# Patient Record
Sex: MALE | Race: BLACK OR AFRICAN AMERICAN | Employment: FULL TIME | URBAN - METROPOLITAN AREA
[De-identification: names, ages, dates, MRNs, and addresses within clinical notes are randomized per-mention and may not be internally consistent; named-entity substitution may affect disease eponyms.]

---

## 2017-02-14 ENCOUNTER — OFFICE VISIT (OUTPATIENT)
Dept: FAMILY MEDICINE | Facility: CLINIC | Age: 47
End: 2017-02-14
Payer: COMMERCIAL

## 2017-02-14 VITALS
BODY MASS INDEX: 27.57 KG/M2 | OXYGEN SATURATION: 97 % | DIASTOLIC BLOOD PRESSURE: 68 MMHG | TEMPERATURE: 98.5 F | WEIGHT: 226.4 LBS | RESPIRATION RATE: 16 BRPM | SYSTOLIC BLOOD PRESSURE: 120 MMHG | HEART RATE: 68 BPM | HEIGHT: 76 IN

## 2017-02-14 DIAGNOSIS — J30.2 SEASONAL ALLERGIC RHINITIS, UNSPECIFIED ALLERGIC RHINITIS TRIGGER: ICD-10-CM

## 2017-02-14 DIAGNOSIS — E55.9 VITAMIN D DEFICIENCY: ICD-10-CM

## 2017-02-14 DIAGNOSIS — R53.83 FATIGUE, UNSPECIFIED TYPE: ICD-10-CM

## 2017-02-14 DIAGNOSIS — Z00.00 ROUTINE GENERAL MEDICAL EXAMINATION AT A HEALTH CARE FACILITY: Primary | ICD-10-CM

## 2017-02-14 LAB
ALBUMIN SERPL-MCNC: 3.9 G/DL (ref 3.4–5)
ALP SERPL-CCNC: 48 U/L (ref 40–150)
ALT SERPL W P-5'-P-CCNC: 32 U/L (ref 0–70)
ANION GAP SERPL CALCULATED.3IONS-SCNC: 6 MMOL/L (ref 3–14)
AST SERPL W P-5'-P-CCNC: 25 U/L (ref 0–45)
BILIRUB SERPL-MCNC: 0.4 MG/DL (ref 0.2–1.3)
BUN SERPL-MCNC: 16 MG/DL (ref 7–30)
CALCIUM SERPL-MCNC: 8.5 MG/DL (ref 8.5–10.1)
CHLORIDE SERPL-SCNC: 105 MMOL/L (ref 94–109)
CO2 SERPL-SCNC: 30 MMOL/L (ref 20–32)
CREAT SERPL-MCNC: 1.22 MG/DL (ref 0.66–1.25)
ERYTHROCYTE [DISTWIDTH] IN BLOOD BY AUTOMATED COUNT: 13.3 % (ref 10–15)
GFR SERPL CREATININE-BSD FRML MDRD: 64 ML/MIN/1.7M2
GLUCOSE SERPL-MCNC: 108 MG/DL (ref 70–99)
HCT VFR BLD AUTO: 43.6 % (ref 40–53)
HDLC SERPL-MCNC: 47 MG/DL
HGB BLD-MCNC: 15.2 G/DL (ref 13.3–17.7)
LDLC SERPL DIRECT ASSAY-MCNC: 94 MG/DL
MCH RBC QN AUTO: 31.9 PG (ref 26.5–33)
MCHC RBC AUTO-ENTMCNC: 34.9 G/DL (ref 31.5–36.5)
MCV RBC AUTO: 91 FL (ref 78–100)
PLATELET # BLD AUTO: 174 10E9/L (ref 150–450)
POTASSIUM SERPL-SCNC: 3.5 MMOL/L (ref 3.4–5.3)
PROT SERPL-MCNC: 7 G/DL (ref 6.8–8.8)
RBC # BLD AUTO: 4.77 10E12/L (ref 4.4–5.9)
SODIUM SERPL-SCNC: 141 MMOL/L (ref 133–144)
TSH SERPL DL<=0.005 MIU/L-ACNC: 0.7 MU/L (ref 0.4–4)
WBC # BLD AUTO: 4.3 10E9/L (ref 4–11)

## 2017-02-14 PROCEDURE — 83718 ASSAY OF LIPOPROTEIN: CPT | Performed by: FAMILY MEDICINE

## 2017-02-14 PROCEDURE — 83721 ASSAY OF BLOOD LIPOPROTEIN: CPT | Performed by: FAMILY MEDICINE

## 2017-02-14 PROCEDURE — 85027 COMPLETE CBC AUTOMATED: CPT | Performed by: FAMILY MEDICINE

## 2017-02-14 PROCEDURE — 80053 COMPREHEN METABOLIC PANEL: CPT | Performed by: FAMILY MEDICINE

## 2017-02-14 PROCEDURE — 36415 COLL VENOUS BLD VENIPUNCTURE: CPT | Performed by: FAMILY MEDICINE

## 2017-02-14 PROCEDURE — 82306 VITAMIN D 25 HYDROXY: CPT | Performed by: FAMILY MEDICINE

## 2017-02-14 PROCEDURE — 84443 ASSAY THYROID STIM HORMONE: CPT | Performed by: FAMILY MEDICINE

## 2017-02-14 PROCEDURE — 99396 PREV VISIT EST AGE 40-64: CPT | Performed by: FAMILY MEDICINE

## 2017-02-14 RX ORDER — FEXOFENADINE HCL AND PSEUDOEPHEDRINE HCI 60; 120 MG/1; MG/1
1 TABLET, EXTENDED RELEASE ORAL 2 TIMES DAILY
Qty: 180 TABLET | Refills: 3 | Status: SHIPPED | OUTPATIENT
Start: 2017-02-14 | End: 2018-12-31

## 2017-02-14 RX ORDER — FLUTICASONE PROPIONATE 50 MCG
1-2 SPRAY, SUSPENSION (ML) NASAL DAILY
Qty: 48 G | Refills: 1 | Status: SHIPPED | OUTPATIENT
Start: 2017-02-14 | End: 2018-03-07

## 2017-02-14 NOTE — MR AVS SNAPSHOT
After Visit Summary   2/14/2017    Bj Philip    MRN: [id number]           Patient Information     Date Of Birth          1970        Visit Information        Provider Department      2/14/2017 2:40 PM Acacia Petersen MD [facility]        Today's Diagnoses     Routine general medical examination at a health care facility    -  1    Fatigue, unspecified type          Care Instructions      Preventive Health Recommendations  Male Ages 40 to 49    Yearly exam:             See your health care provider every year in order to  o   Review health changes.   o   Discuss preventive care.    o   Review your medicines if your doctor has prescribed any.    You should be tested each year for STDs (sexually transmitted diseases) if you re at risk.     Have a cholesterol test every 5 years.     Have a colonoscopy (test for colon cancer) if someone in your family has had colon cancer or polyps before age 50.     After age 45, have a diabetes test (fasting glucose). If you are at risk for diabetes, you should have this test every 3 years.      Talk with your health care provider about whether or not a prostate cancer screening test (PSA) is right for you.    Shots: Get a flu shot each year. Get a tetanus shot every 10 years.     Nutrition:    Eat at least 5 servings of fruits and vegetables daily.     Eat whole-grain bread, whole-wheat pasta and brown rice instead of white grains and rice.     Talk to your provider about Calcium and Vitamin D.     Lifestyle    Exercise for at least 150 minutes a week (30 minutes a day, 5 days a week). This will help you control your weight and prevent disease.     Limit alcohol to one drink per day.     No smoking.     Wear sunscreen to prevent skin cancer.     See your dentist every six months for an exam and cleaning.            Follow-ups after your visit        Follow-up notes from your care team     Return in about 1 year (around 2/14/2018).     "  Who to contact     If you have questions or need follow up information about today's clinic visit or your schedule please contact [facility] directly at [phone].  Normal or non-critical lab and imaging results will be communicated to you by MyChart, letter or phone within 4 business days after the clinic has received the results. If you do not hear from us within 7 days, please contact the clinic through [employer] Software[employer] or phone. If you have a critical or abnormal lab result, we will notify you by phone as soon as possible.  Submit refill requests through [employer]pack or call your pharmacy and they will forward the refill request to us. Please allow 3 business days for your refill to be completed.          Additional Information About Your Visit        [employer] Backpack Information     [employer] gives you secure access to your electronic health record. If you see a primary care provider, you can also send messages to your care team and make appointments. If you have questions, please call your primary care clinic.  If you do not have a primary care provider, please call [phone] and they will assist you.        Care EveryWhere ID     This is your Care EveryWhere ID. This could be used by other organizations to access your [place] medical records  [id number]        Your Vitals Were     Pulse Temperature Respirations Height Pulse Oximetry BMI (Body Mass Index)    68 98.5  F (36.9  C) (Oral) 16 1.925 m (6' 3.79\") 97% 27.71 kg/m2       Blood Pressure from Last 3 Encounters:   02/14/17 120/68   10/25/16 106/68   10/12/16 106/68    Weight from Last 3 Encounters:   02/14/17 102.7 kg (226 lb 6.4 oz)   10/25/16 101.6 kg (224 lb)   10/12/16 100.5 kg (221 lb 9.6 oz)              We Performed the Following     CBC with platelets     Comprehensive metabolic panel     HDL cholesterol     LDL cholesterol direct     TSH with free T4 reflex     Vitamin D Deficiency        Primary Care Provider Office Phone # Fax #    Acacia " [place] Petersen [phone]       [facility] [address] [address]        Thank you!     Thank you for choosing [facility]  for your care. Our goal is always to provide you with excellent care. Hearing back from our patients is one way we can continue to improve our services. Please take a few minutes to complete the written survey that you may receive in the mail after your visit with us. Thank you!             Your Updated Medication List - Protect others around you: Learn how to safely use, store and throw away your medicines at www.disposemymeds.org.          This list is accurate as of: 2/14/17  3:29 PM.  Always use your most recent med list.                   Brand Name Dispense Instructions for use    fexofenadine-pseudoePHEDrine  MG per 12 hr tablet    ALLEGRA-D    180 tablet    Take 1 tablet by mouth 2 times daily       fluticasone 50 MCG/ACT spray    FLONASE    3 Package    Spray 1-2 sprays into both nostrils daily       ibuprofen 800 MG tablet    ADVIL/MOTRIN    90 tablet    Take 1 tablet (800 mg) by mouth 3 times daily as needed for pain       sulindac 200 MG tablet    CLINORIL    60 tablet    Take 1 tablet (200 mg) by mouth 2 times daily (with meals)

## 2017-02-14 NOTE — NURSING NOTE
"Chief Complaint   Patient presents with     Physical       Initial /68 (BP Location: Right arm, Cuff Size: Adult Regular)  Pulse 68  Temp 98.5  F (36.9  C) (Oral)  Resp 16  Ht 1.925 m (6' 3.79\")  Wt 102.7 kg (226 lb 6.4 oz)  SpO2 97%  BMI 27.71 kg/m2 Estimated body mass index is 27.71 kg/(m^2) as calculated from the following:    Height as of this encounter: 1.925 m (6' 3.79\").    Weight as of this encounter: 102.7 kg (226 lb 6.4 oz).  Medication Reconciliation: complete     JCARLOS Clark (R)'    "

## 2017-02-14 NOTE — PROGRESS NOTES
SUBJECTIVE:     CC: Bj Philip is an 46 year old male who presents for preventative health visit.     Healthy Habits:    Do you get at least three servings of calcium containing foods daily (dairy, green leafy vegetables, etc.)? y    Amount of exercise or daily activities, outside of work: 4 day(s) per week 1.5hrs    Problems taking medications regularly No    Medication side effects: No    Have you had an eye exam in the past two years? yes    Do you see a dentist twice per year? yes    Do you have sleep apnea, excessive snoring or daytime drowsiness?snoring           Today's PHQ-2 Score:   PHQ-2 ( 1999 Pfizer) 2/14/2017 5/6/2015   Q1: Little interest or pleasure in doing things 0 0   Q2: Feeling down, depressed or hopeless 0 0   PHQ-2 Score 0 0       Abuse: Current or Past(Physical, Sexual or Emotional)- No  Do you feel safe in your environment - Yes    Social History   Substance Use Topics     Smoking status: Never Smoker     Smokeless tobacco: Never Used     Alcohol use No     The patient does not drink >3 drinks per day nor >7 drinks per week.    Last PSA: No results found for: PSA    Recent Labs   Lab Test  04/27/15   0913   CHOL  169   HDL  38*   LDL  115   TRIG  81   CHOLHDLRATIO  4.4       Reviewed orders with patient. Reviewed health maintenance and updated orders accordingly - Yes    All Histories reviewed and updated in Epic.  Here today for routine checkup. For the most part he is doing fine but he notes some overall fatigue. Continues to be quite active at the gym which is feels as though his workouts aren't as strong as they should be. No fevers or chills or weight changes or any other significant constitutional symptoms.    ROS:  CONSTITUTIONAL: As above  I: NEGATIVE for worrisome rashes, moles or lesions  E: NEGATIVE for vision changes or irritation  ENT: NEGATIVE for ear, mouth and throat problems  R: NEGATIVE for significant cough or SOB  CV: NEGATIVE for chest pain, palpitations or  "peripheral edema  GI: NEGATIVE for nausea, abdominal pain, heartburn, or change in bowel habits   male: negative for dysuria, hematuria, decreased urinary stream, erectile dysfunction, urethral discharge  M: NEGATIVE for significant arthralgias or myalgia  N: NEGATIVE for weakness, dizziness or paresthesias  P: NEGATIVE for changes in mood or affect    Problem list, Medication list, Allergies, and Medical/Social/Surgical histories reviewed in [facility] and updated as appropriate.  Labs reviewed in EPIC  BP Readings from Last 3 Encounters:   02/14/17 120/68   10/25/16 106/68   10/12/16 106/68    Wt Readings from Last 3 Encounters:   02/14/17 102.7 kg (226 lb 6.4 oz)   10/25/16 101.6 kg (224 lb)   10/12/16 100.5 kg (221 lb 9.6 oz)                  OBJECTIVE:     /68 (BP Location: Right arm, Cuff Size: Adult Regular)  Pulse 68  Temp 98.5  F (36.9  C) (Oral)  Resp 16  Ht 1.925 m (6' 3.79\")  Wt 102.7 kg (226 lb 6.4 oz)  SpO2 97%  BMI 27.71 kg/m2  EXAM:  GENERAL: healthy, alert and no distress  EYES: Eyes grossly normal to inspection, PERRL and conjunctivae and sclerae normal  HENT: ear canals and TM's normal, nose and mouth without ulcers or lesions  NECK: no adenopathy, no asymmetry, masses, or scars and thyroid normal to palpation  RESP: lungs clear to auscultation - no rales, rhonchi or wheezes  CV: regular rate and rhythm, normal S1 S2, no S3 or S4, no murmur, click or rub, no peripheral edema and peripheral pulses strong  ABDOMEN: soft, nontender, no hepatosplenomegaly, no masses and bowel sounds normal  MS: no gross musculoskeletal defects noted, no edema  SKIN: no suspicious lesions or rashes  NEURO: Normal strength and tone, mentation intact and speech normal  PSYCH: mentation appears normal, affect normal/bright    ASSESSMENT/PLAN:     1. Routine general medical examination at a health care facility  Health care maintenance up to date otherwise   - CBC with platelets  - HDL cholesterol  - LDL cholesterol " "direct  - TSH with free T4 reflex  - Vitamin D Deficiency    2. Fatigue, unspecified type  Offhand this does not strike me as anything specifically pathologic. Talked about looking at some screening lab work  - CBC with platelets  - Comprehensive metabolic panel  - TSH with free T4 reflex  - Vitamin D Deficiency    COUNSELING:  Reviewed preventive health counseling, as reflected in patient instructions       Regular exercise       Healthy diet/nutrition       Vision screening       Safe sex practices/STD prevention         reports that he has never smoked. He has never used smokeless tobacco.    Estimated body mass index is 27.71 kg/(m^2) as calculated from the following:    Height as of this encounter: 1.925 m (6' 3.79\").    Weight as of this encounter: 102.7 kg (226 lb 6.4 oz).       Counseling Resources:  ATP IV Guidelines  Pooled Cohorts Equation Calculator  FRAX Risk Assessment  ICSI Preventive Guidelines  Dietary Guidelines for Americans, 2010  USDA's MyPlate  ASA Prophylaxis  Lung CA Screening    Acacia Petersen MD  [facility]  "

## 2017-02-15 LAB — DEPRECATED CALCIDIOL+CALCIFEROL SERPL-MC: 15 UG/L (ref 20–75)

## 2017-02-19 PROBLEM — E55.9 VITAMIN D DEFICIENCY: Status: ACTIVE | Noted: 2017-02-19

## 2017-02-19 RX ORDER — ERGOCALCIFEROL 1.25 MG/1
50000 CAPSULE, LIQUID FILLED ORAL WEEKLY
Qty: 12 CAPSULE | Refills: 1 | Status: SHIPPED | OUTPATIENT
Start: 2017-02-19 | End: 2017-05-09

## 2017-02-20 NOTE — PROGRESS NOTES
Amado,  Overall your lab work looks pretty good - but that vitamin D is very low.  This could very well be the reason you feel somewhat sluggish.  I bet if we bring this up you will feel a lot better.  I will send a prescription for a prescription-strength vitamin supplement - take it once weekly.  And we can plan to recheck in about 3 months.  JUAN F Petersen M.D.

## 2017-03-14 ENCOUNTER — OFFICE VISIT (OUTPATIENT)
Dept: FAMILY MEDICINE | Facility: CLINIC | Age: 47
End: 2017-03-14
Payer: COMMERCIAL

## 2017-03-14 VITALS — DIASTOLIC BLOOD PRESSURE: 66 MMHG | TEMPERATURE: 98.3 F | SYSTOLIC BLOOD PRESSURE: 112 MMHG

## 2017-03-14 DIAGNOSIS — Z71.84 ENCOUNTER FOR COUNSELING FOR TRAVEL: Primary | ICD-10-CM

## 2017-03-14 PROCEDURE — 90717 YELLOW FEVER VACCINE SUBQ: CPT | Mod: GA | Performed by: FAMILY MEDICINE

## 2017-03-14 PROCEDURE — 99402 PREV MED CNSL INDIV APPRX 30: CPT | Mod: 25 | Performed by: FAMILY MEDICINE

## 2017-03-14 PROCEDURE — 90471 IMMUNIZATION ADMIN: CPT | Mod: GA | Performed by: FAMILY MEDICINE

## 2017-03-14 RX ORDER — CIPROFLOXACIN 500 MG/1
TABLET, FILM COATED ORAL
Qty: 6 TABLET | Refills: 0 | Status: SHIPPED | OUTPATIENT
Start: 2017-03-14 | End: 2017-10-18

## 2017-03-14 RX ORDER — ATOVAQUONE AND PROGUANIL HYDROCHLORIDE 250; 100 MG/1; MG/1
1 TABLET, FILM COATED ORAL DAILY
Qty: 21 TABLET | Refills: 0 | Status: SHIPPED | OUTPATIENT
Start: 2017-03-14 | End: 2017-10-18

## 2017-03-14 NOTE — MR AVS SNAPSHOT
After Visit Summary   3/14/2017    Bj Philip    MRN: [id number]           Patient Information     Date Of Birth          1970        Visit Information        Provider Department      3/14/2017 1:45 PM Anirudh Velásquez MD [facility]        Today's Diagnoses     Encounter for counseling for travel    -  1       Follow-ups after your visit        Who to contact     If you have questions or need follow up information about today's clinic visit or your schedule please contact [facility] directly at [phone].  Normal or non-critical lab and imaging results will be communicated to you by Lockrhart, letter or phone within 4 business days after the clinic has received the results. If you do not hear from us within 7 days, please contact the clinic through [employer]It or phone. If you have a critical or abnormal lab result, we will notify you by phone as soon as possible.  Submit refill requests through [email or web address] or call your pharmacy and they will forward the refill request to us. Please allow 3 business days for your refill to be completed.          Additional Information About Your Visit        MyChart Information     [email or web address] gives you secure access to your electronic health record. If you see a primary care provider, you can also send messages to your care team and make appointments. If you have questions, please call your primary care clinic.  If you do not have a primary care provider, please call [phone] and they will assist you.        Care EveryWhere ID     This is your Care EveryWhere ID. This could be used by other organizations to access your [place] medical records  [id number]        Your Vitals Were     Temperature                   98.3  F (36.8  C) (Oral)            Blood Pressure from Last 3 Encounters:   03/14/17 112/66   02/14/17 120/68   10/25/16 106/68    Weight from Last 3 Encounters:   02/14/17 226 lb 6.4 oz (102.7 kg)   10/25/16 224 lb  (101.6 kg)   10/12/16 221 lb 9.6 oz (100.5 kg)              We Performed the Following     YELLOW FEVER IMMUNIZATN,LIVE,SUBCUT          Today's Medication Changes          These changes are accurate as of: 3/14/17 11:59 PM.  If you have any questions, ask your nurse or doctor.               Start taking these medicines.        Dose/Directions    atovaquone-proguanil 250-100 MG per tablet   Commonly known as:  MALARONE   Used for:  Encounter for counseling for travel   Started by:  Anirudh Velásquez MD        Dose:  1 tablet   Take 1 tablet by mouth daily Start 1 day before travelling to a Malaria risk area and continue until 7 days after return.   Quantity:  21 tablet   Refills:  0       ciprofloxacin 500 MG tablet   Commonly known as:  CIPRO   Used for:  Encounter for counseling for travel   Started by:  Anirudh Velásquez MD        Take one tablet twice a day for up to 3 days as needed for traveler's diarrhea   Quantity:  6 tablet   Refills:  0            Where to get your medicines      These medications were sent to [facility] [address] [place] [address] [address] [place] [address]  [address] [facility] [address]    Hours:  24-hours Phone:  [phone]     atovaquone-proguanil 250-100 MG per tablet    ciprofloxacin 500 MG tablet                Primary Care Provider Office Phone # Fax #    Acacia [place] Petersen [phone]       [address] [address]        Thank you!     Thank you for choosing [facility]  for your care. Our goal is always to provide you with excellent care. Hearing back from our patients is one way we can continue to improve our services. Please take a few minutes to complete the written survey that you may receive in the mail after your visit with us. Thank you!             Your Updated Medication List - Protect others around you: Learn how to safely use,  store and throw away your medicines at www.disposemymeds.org.          This list is accurate as of: 3/14/17 11:59 PM.  Always use your most recent med list.                   Brand Name Dispense Instructions for use    atovaquone-proguanil 250-100 MG per tablet    MALARONE    21 tablet    Take 1 tablet by mouth daily Start 1 day before travelling to a Malaria risk area and continue until 7 days after return.       ciprofloxacin 500 MG tablet    CIPRO    6 tablet    Take one tablet twice a day for up to 3 days as needed for traveler's diarrhea       fexofenadine-pseudoePHEDrine  MG per 12 hr tablet    ALLEGRA-D    180 tablet    Take 1 tablet by mouth 2 times daily       fluticasone 50 MCG/ACT spray    FLONASE    48 g    Spray 1-2 sprays into both nostrils daily       ibuprofen 800 MG tablet    ADVIL/MOTRIN    90 tablet    Take 1 tablet (800 mg) by mouth 3 times daily as needed for pain       sulindac 200 MG tablet    CLINORIL    60 tablet    Take 1 tablet (200 mg) by mouth 2 times daily (with meals)       vitamin D 00827 UNIT capsule    ERGOCALCIFEROL    12 capsule    Take 1 capsule (50,000 Units) by mouth once a week

## 2017-03-14 NOTE — PROGRESS NOTES
Itinerary:  [place]    Departure Date: 05/09/2017    Return Date: 05/20/2017    Length of Trip: 11 days     Purpose of Trip: Wedding Trip    Urban/Rural: Urban    Accommodations: Hotel     IMMUNIZATION HISTORY  Have you received any immunizations within the past 4 weeks?  No  Have you ever fainted from having your blood drawn or from an injection?  No  Have you ever had a fever reaction to vaccination?  No  Have you ever had any bad reaction or side effect from any vaccination?  No  Have you ever had hepatitis A or B vaccine?  Yes  Do you live (or work closely) with anyone who has AIDS, an AIDS-like condition, any other immune disorder or who is on chemotherapy for cancer or a   family history of immunodeficiency?  No  Have you received any injection of immune globulin or any blood products during the past 12 months?  No    Patient roomed by Joy Sanchez MA       Special medical concerns: none    /66  Temp 98.3  F (36.8  C) (Oral)  EXAM: deferred  Immunization History   Administered Date(s) Administered     DTAP (<7y) 10/13/2001     Hepatitis A Vac Ped/Adol-2 Dose 02/20/2003, 04/10/2012     Hepatitis B 01/01/2002, 02/02/2002, 06/02/2002     MMR 01/01/1988     TDAP (ADACEL AGES 11-64) 04/10/2012     Typhoid IM 02/20/2003, 03/06/2017     Yellow Fever 03/14/2017     Some of these vaccinations are estimated based on patient report    Immunizations discussed include: Yellow Fever  Malaraia prophylaxis recommended: Malarone  Symptomatic treatment for traveler's diarrhea: ciprofloxacin    ASSESSMENT/PLAN:    ICD-10-CM    1. Encounter for counseling for travel Z71.89 YELLOW FEVER IMMUNIZATN,LIVE,SUBCUT     atovaquone-proguanil (MALARONE) 250-100 MG per tablet     ciprofloxacin (CIPRO) 500 MG tablet     I have reviewed general recommendations for safe travel   including: food/water precautions, insect avoidance, safe sex   practices given high prevalence of HIV and other STDs,   roadway safety. Educational  materials and [employer] report provided.    Total visit time 30 minutes with over 50% of time spent counseling patient.

## 2017-05-09 ENCOUNTER — OFFICE VISIT (OUTPATIENT)
Dept: FAMILY MEDICINE | Facility: CLINIC | Age: 47
End: 2017-05-09
Payer: OTHER MISCELLANEOUS

## 2017-05-09 VITALS
TEMPERATURE: 97.7 F | HEIGHT: 76 IN | HEART RATE: 52 BPM | SYSTOLIC BLOOD PRESSURE: 116 MMHG | OXYGEN SATURATION: 98 % | RESPIRATION RATE: 14 BRPM | WEIGHT: 229 LBS | DIASTOLIC BLOOD PRESSURE: 78 MMHG | BODY MASS INDEX: 27.89 KG/M2

## 2017-05-09 DIAGNOSIS — M25.512 LEFT SHOULDER PAIN, UNSPECIFIED CHRONICITY: Primary | ICD-10-CM

## 2017-05-09 DIAGNOSIS — Z98.890 S/P ARTHROSCOPY OF SHOULDER: ICD-10-CM

## 2017-05-09 PROCEDURE — 99214 OFFICE O/P EST MOD 30 MIN: CPT | Performed by: FAMILY MEDICINE

## 2017-05-09 RX ORDER — HYDROCODONE BITARTRATE AND ACETAMINOPHEN 5; 325 MG/1; MG/1
1-2 TABLET ORAL EVERY 6 HOURS PRN
Qty: 30 TABLET | Refills: 0 | Status: SHIPPED | OUTPATIENT
Start: 2017-05-09 | End: 2018-02-19

## 2017-05-09 ASSESSMENT — PAIN SCALES - GENERAL: PAINLEVEL: NO PAIN (0)

## 2017-05-09 NOTE — MR AVS SNAPSHOT
After Visit Summary   5/9/2017    Bj Philip    MRN: [id number]           Patient Information     Date Of Birth          1970        Visit Information        Provider Department      5/9/2017 9:20 AM Acacia Petersen MD [facility]        Today's Diagnoses     Left shoulder pain, unspecified chronicity    -  1    S/P arthroscopy of shoulder           Follow-ups after your visit        Follow-up notes from your care team     Return if symptoms worsen or fail to improve.      Your next 10 appointments already scheduled     May 24, 2017 10:15 AM CDT   MR SHOULDER LEFT W/O CONTRAST with MGMR1   [facility] ([facility]    [address] [address] [place] [address] [phone]           Take your medicines as usual, unless your doctor tells you not to. Bring a list of your current medicines to your exam (including vitamins, minerals and over-the-counter drugs). Also bring the results of similar scans you may have had.  Please remove any body piercings and hair extensions before you arrive.  Follow your doctor s orders. If you do not, we may have to postpone your exam.  You will not have contrast for this exam. You do not need to do anything special to prepare.  The MRI machine uses a strong magnet. Please wear clothes without metal (snaps, zippers). A sweatsuit works well, or we may give you a hospital gown.   **IMPORTANT** THE INSTRUCTIONS BELOW ARE ONLY FOR THOSE PATIENTS WHO HAVE BEEN TOLD THEY WILL RECEIVE SEDATION OR GENERAL ANESTHESIA DURING THEIR MRI PROCEDURE:  IF YOU WILL RECEIVE SEDATION (take medicine to help you relax during your exam):   You must get the medicine from your doctor before you arrive. Bring the medicine to the exam. Do not take it at home.   Arrive one hour early. Bring someone who can take you home after the test. Your medicine will make you sleepy. After the exam, you may not drive, take a bus or take a  taxi by yourself.   No eating 8 hours before your exam. You may have clear liquids up until 4 hours before your exam. (Clear liquids include water, clear tea, black coffee and fruit juice without pulp.)  IF YOU WILL RECEIVE ANESTHESIA (be asleep for your exam):   Arrive 1 1/2 hours early. Bring someone who can take you home after the test. You may not drive, take a bus or take a taxi by yourself.   No eating 8 hours before your exam. You may have clear liquids up until 4 hours before your exam. (Clear liquids include water, clear tea, black coffee and fruit juice without pulp.)   You will spend four to five hours in the recovery room.  Please call the Imaging Department at your exam site with any questions.              Future tests that were ordered for you today     Open Future Orders        Priority Expected Expires Ordered    MR Shoulder Left w/o Contrast Routine  5/9/2018 5/9/2017            Who to contact     If you have questions or need follow up information about today's clinic visit or your schedule please contact [facility] directly at [phone].  Normal or non-critical lab and imaging results will be communicated to you by Wowsaihart, letter or phone within 4 business days after the clinic has received the results. If you do not hear from us within 7 days, please contact the clinic through [employer] or phone. If you have a critical or abnormal lab result, we will notify you by phone as soon as possible.  Submit refill requests through [employer] or call your pharmacy and they will forward the refill request to us. Please allow 3 business days for your refill to be completed.          Additional Information About Your Visit        [employer] Information     [employer] gives you secure access to your electronic health record. If you see a primary care provider, you can also send messages to your care team and make appointments. If you have questions, please call your primary care clinic.  If you do  "not have a primary care provider, please call [phone] and they will assist you.        Care EveryWhere ID     This is your Care EveryWhere ID. This could be used by other organizations to access your [facility] medical records  [id number]        Your Vitals Were     Pulse Temperature Respirations Height Pulse Oximetry BMI (Body Mass Index)    52 97.7  F (36.5  C) (Oral) 14 1.925 m (6' 3.78\") 98% 28.04 kg/m2       Blood Pressure from Last 3 Encounters:   05/09/17 116/78   03/14/17 112/66   02/14/17 120/68    Weight from Last 3 Encounters:   05/09/17 103.9 kg (229 lb)   02/14/17 102.7 kg (226 lb 6.4 oz)   10/25/16 101.6 kg (224 lb)                 Today's Medication Changes          These changes are accurate as of: 5/9/17 10:10 AM.  If you have any questions, ask your nurse or doctor.               Start taking these medicines.        Dose/Directions    HYDROcodone-acetaminophen 5-325 MG per tablet   Commonly known as:  NORCO   Used for:  Left shoulder pain, unspecified chronicity   Started by:  Acacia Petersen MD        Dose:  1-2 tablet   Take 1-2 tablets by mouth every 6 hours as needed for moderate to severe pain   Quantity:  30 tablet   Refills:  0       nabumetone 750 MG tablet   Commonly known as:  RELAFEN   Used for:  Left shoulder pain, unspecified chronicity   Started by:  Acacia Petersen MD        Dose:  750 mg   Take 1 tablet (750 mg) by mouth 2 times daily as needed for moderate pain   Quantity:  30 tablet   Refills:  1            Where to get your medicines      These medications were sent to [facility] [address] [place], [place] - [address] [facility] [address] [facility]  [address] [address]    Hours:  24-hours Phone:  [phone]     nabumetone 750 MG tablet         Some of these will need a paper prescription and others can be bought over the counter.  Ask your nurse if you have questions.     Bring a paper prescription for each of " these medications     HYDROcodone-acetaminophen 5-325 MG per tablet                Primary Care Provider Office Phone # Fax #    Acacia Washington Petersen [phone]       [facility] [address] [place] [address]        Thank you!     Thank you for choosing [facility]  for your care. Our goal is always to provide you with excellent care. Hearing back from our patients is one way we can continue to improve our services. Please take a few minutes to complete the written survey that you may receive in the mail after your visit with us. Thank you!             Your Updated Medication List - Protect others around you: Learn how to safely use, store and throw away your medicines at www.disposemymeds.org.          This list is accurate as of: 5/9/17 10:10 AM.  Always use your most recent med list.                   Brand Name Dispense Instructions for use    atovaquone-proguanil 250-100 MG per tablet    MALARONE    21 tablet    Take 1 tablet by mouth daily Start 1 day before travelling to a Malaria risk area and continue until 7 days after return.       ciprofloxacin 500 MG tablet    CIPRO    6 tablet    Take one tablet twice a day for up to 3 days as needed for traveler's diarrhea       fexofenadine-pseudoePHEDrine  MG per 12 hr tablet    ALLEGRA-D    180 tablet    Take 1 tablet by mouth 2 times daily       fluticasone 50 MCG/ACT spray    FLONASE    48 g    Spray 1-2 sprays into both nostrils daily       HYDROcodone-acetaminophen 5-325 MG per tablet    NORCO    30 tablet    Take 1-2 tablets by mouth every 6 hours as needed for moderate to severe pain       ibuprofen 800 MG tablet    ADVIL/MOTRIN    90 tablet    Take 1 tablet (800 mg) by mouth 3 times daily as needed for pain       nabumetone 750 MG tablet    RELAFEN    30 tablet    Take 1 tablet (750 mg) by mouth 2 times daily as needed for moderate pain       sulindac 200 MG tablet    CLINORIL    60  tablet    Take 1 tablet (200 mg) by mouth 2 times daily (with meals)

## 2017-05-09 NOTE — PROGRESS NOTES
"  SUBJECTIVE:                                                    Bj Philip is a 46 year old male who presents to clinic today for the following health issues:      Joint Pain     Onset: 1 month    Description:   Location: L shoulder  Character: burning    Intensity: 1/10    Progression of Symptoms: worse    Accompanying Signs & Symptoms:  Other symptoms: numbness   History:   Previous similar pain: no       Precipitating factors:   Trauma or overuse: no     Alleviating factors:  Improved by: Rest, Ibuprofen       Therapies Tried and outcome: Rest, Ibuprofen    SUBJECTIVE:  Here today with the above symptoms that have been present for about a month or so without any specific injury. Seems to be worsening over the last week. Biggest concern is that he had arthroscopic shoulder decompression in 2010 and he is worried that something may have gone wrong with the surgery. Feels a generalized pain and instability primarily with pulling and forward flexion. Has no trouble getting his arm up and out to the side. A bit uncomfortable to sleep on. No neck symptoms and no numbness or tingling. Overall his shoulder feels weak compared to where it had been before.    Review of systems otherwise negative.  Past medical, family, and social history reviewed and updated in chart.    OBJECTIVE:  /78 (BP Location: Right arm, Patient Position: Right side, Cuff Size: Adult Regular)  Pulse 52  Temp 97.7  F (36.5  C) (Oral)  Resp 14  Ht 1.925 m (6' 3.78\")  Wt 103.9 kg (229 lb)  SpO2 98%  BMI 28.04 kg/m2  Alert, pleasant, upbeat, and in no apparent discomfort.  S1 and S2 normal, no murmurs, clicks, gallops or rubs. Regular rate and rhythm. Chest is clear; no wheezes or rales. No edema or JVD.   LUE - full active range of motion in the shoulder. No muscle defect or tenderness to palpation including of the biceps insertion  There is quite a bit of grinding with passive range of motion but no obvious instability  Rotator cuff " testing is strong and stable  Past labs reviewed with the patient.     ASSESSMENT / PLAN:  (M25.512) Left shoulder pain, unspecified chronicity  (primary encounter diagnosis)  Comment: Discussed options with the patient and he is very concerned about something intracranial going on. We will set up an MRI for evaluation. Discussed cautious use of the shoulder between now and then  Plan: MR Shoulder Left w/o Contrast,         HYDROcodone-acetaminophen (NORCO) 5-325 MG per         tablet, nabumetone (RELAFEN) 750 MG tablet            (Z98.890) S/P arthroscopy of shoulder  Comment:   Plan:     Follow up based on results  S. Washington Petersen MD    (Chart documentation completed in part with Dragon voice-recognition software.  Even though reviewed some grammatical, spelling, and word errors may remain.)

## 2017-05-24 ENCOUNTER — RADIANT APPOINTMENT (OUTPATIENT)
Dept: MRI IMAGING | Facility: CLINIC | Age: 47
End: 2017-05-24
Attending: FAMILY MEDICINE
Payer: OTHER MISCELLANEOUS

## 2017-05-24 DIAGNOSIS — S43.432A LABRAL TEAR OF SHOULDER, LEFT, INITIAL ENCOUNTER: ICD-10-CM

## 2017-05-24 DIAGNOSIS — M19.012 GLENOHUMERAL ARTHRITIS, LEFT: Primary | ICD-10-CM

## 2017-05-24 DIAGNOSIS — M25.512 LEFT SHOULDER PAIN, UNSPECIFIED CHRONICITY: ICD-10-CM

## 2017-05-24 PROCEDURE — 73221 MRI JOINT UPR EXTREM W/O DYE: CPT | Mod: LT | Performed by: RADIOLOGY

## 2017-05-25 NOTE — PROGRESS NOTES
Amado,  Well, the rotator cuff looks OK, but there is quite a bit of arthritis in the shoulder joint itself and some tearing of the labral cartilage.  This is likely the cause of your symptoms.  I think we should get you in with a specialist to discuss options.  I suggest Dr Annmarie Jay at our [place] office ([phone]).  She is a shoulder expert - one of the best in the [facility].  I will go ahead and place the referral.  JUAN F Petersen M.D.

## 2017-10-18 ENCOUNTER — OFFICE VISIT (OUTPATIENT)
Dept: FAMILY MEDICINE | Facility: CLINIC | Age: 47
End: 2017-10-18
Payer: OTHER MISCELLANEOUS

## 2017-10-18 VITALS
TEMPERATURE: 98.5 F | OXYGEN SATURATION: 98 % | SYSTOLIC BLOOD PRESSURE: 114 MMHG | WEIGHT: 226.6 LBS | DIASTOLIC BLOOD PRESSURE: 76 MMHG | HEIGHT: 76 IN | HEART RATE: 64 BPM | RESPIRATION RATE: 16 BRPM | BODY MASS INDEX: 27.59 KG/M2

## 2017-10-18 DIAGNOSIS — Z01.818 PREOP GENERAL PHYSICAL EXAM: Primary | ICD-10-CM

## 2017-10-18 DIAGNOSIS — N52.9 ERECTILE DYSFUNCTION, UNSPECIFIED ERECTILE DYSFUNCTION TYPE: ICD-10-CM

## 2017-10-18 PROCEDURE — 99214 OFFICE O/P EST MOD 30 MIN: CPT | Performed by: FAMILY MEDICINE

## 2017-10-18 RX ORDER — SILDENAFIL CITRATE 20 MG/1
TABLET ORAL
Qty: 20 TABLET | Refills: 2 | Status: SHIPPED | OUTPATIENT
Start: 2017-10-18 | End: 2018-02-19

## 2017-10-18 ASSESSMENT — PAIN SCALES - GENERAL: PAINLEVEL: NO PAIN (0)

## 2017-10-18 NOTE — PROGRESS NOTES
[address] [address]  [phone]  Dept: [phone]    PRE-OP EVALUATION:  Today's date: 10/18/2017    Bj Philip (: 1970) presents for pre-operative evaluation assessment as requested by Dr. Branham.  He requires evaluation and anesthesia risk assessment prior to undergoing surgery/procedure for treatment of left shoulder impingement syndrome.  Proposed procedure: L shoulder debridement    Date of Surgery/ Procedure: 10/23/17  Time of Surgery/ Procedure: [facility]  [facility]  Fax number for surgical facility: [phone]  Primary Physician: Acacia Petersen  Type of Anesthesia Anticipated: General    Patient has a Health Care Directive or Living Will:  NO    1. NO - Do you have a history of heart attack, stroke, stent, bypass or surgery on an artery in the head, neck, heart or legs?  2. NO - Do you ever have any pain or discomfort in your chest?  3. NO - Do you have a history of  Heart Failure?  4. NO - Are you troubled by shortness of breath when: walking on the level, up a slight hill or at night?  5. NO - Do you currently have a cold, bronchitis or other respiratory infection?  6. NO - Do you have a cough, shortness of breath or wheezing?  7. NO - Do you sometimes get pains in the calves of your legs when you walk?  8. NO - Do you or anyone in your family have previous history of blood clots?  9. NO - Do you or does anyone in your family have a serious bleeding problem such as prolonged bleeding following surgeries or cuts?  10. NO - Have you ever had problems with anemia or been told to take iron pills?  11. NO - Have you had any abnormal blood loss such as black, tarry or bloody stools, or abnormal vaginal bleeding?  12. NO - Have you ever had a blood transfusion?  13. NO - Have you or any of your relatives ever had problems with anesthesia?  14. NO - Do you have sleep apnea,  excessive snoring or daytime drowsiness?  15. NO - Do you have any prosthetic heart valves?  16. NO - Do you have prosthetic joints?  17. NO - Is there any chance that you may be pregnant?        HPI:                                                      Brief HPI related to upcoming procedure:   Long-standing progressive left shoulder pain. Recent MRI did not show significant rotator cuff tear the patient is quite a bit of degeneration. Saw Dr Branham we discussed with the patient that ultimately a shoulder replacement will be needed at some point. Advised debridement procedure for now to stave this off    See problem list for active medical problems.  Problems all longstanding and stable, except as noted/documented.  See ROS for pertinent symptoms related to these conditions.                                                                                                  .    MEDICAL HISTORY:                                                    Patient Active Problem List    Diagnosis Date Noted     Labral tear of shoulder, left, initial encounter 05/24/2017     Priority: Medium     Glenohumeral arthritis, left 05/24/2017     Priority: Medium     S/P arthroscopy of shoulder 05/09/2017     Priority: Medium     Vitamin D deficiency 02/19/2017     Priority: Medium     Pain in joint, lower leg 07/22/2013     Priority: Medium     Seasonal allergies      Priority: Medium     CARDIOVASCULAR SCREENING; LDL GOAL LESS THAN 160 10/31/2010     Priority: Medium      Past Medical History:   Diagnosis Date     Pain     left thumb     Seasonal allergies      Past Surgical History:   Procedure Laterality Date     ARTHROPLASTY CARPOMETACARPAL (THUMB JOINT)      LT revision, Dr. Alok Rivera     ARTHROPLASTY CARPOMETACARPAL (THUMB JOINT), ARTHRODESIS, COMBINED  8/4/2011    Procedure:COMBINED ARTHROPLASTY CARPOMETACARPAL (THUMB JOINT), ARTHRODESIS; Metacarpalphalangeal Joint Fusion; Surgeon:ABNER GONZALEZ; [facility]      ARTHROSCOPY SHOULDER RT/LT  8/10    LT decompression of impingement     REPAIR DARLYNEPER'S THUMB  8/4/2011    Procedure:REPAIR LIGAMENT ULNAR COLLATERAL THUMB (GAMEKEEPER'S); Thumb Radial Reconstruction    ; Surgeon:ABNER GONZALEZ; Location[facility]     Current Outpatient Prescriptions   Medication Sig Dispense Refill     fexofenadine-pseudoePHEDrine (ALLEGRA-D)  MG per 12 hr tablet Take 1 tablet by mouth 2 times daily 180 tablet 3     fluticasone (FLONASE) 50 MCG/ACT spray Spray 1-2 sprays into both nostrils daily 48 g 1     HYDROcodone-acetaminophen (NORCO) 5-325 MG per tablet Take 1-2 tablets by mouth every 6 hours as needed for moderate to severe pain 30 tablet 0     ibuprofen (ADVIL,MOTRIN) 800 MG tablet Take 1 tablet (800 mg) by mouth 3 times daily as needed for pain 90 tablet 2     OTC products: None, except as noted above    No Known Allergies   Latex Allergy: NO    Social History   Substance Use Topics     Smoking status: Never Smoker     Smokeless tobacco: Never Used     Alcohol use No     History   Drug Use No       REVIEW OF SYSTEMS:                                                    C: NEGATIVE for fever, chills, change in weight  I: NEGATIVE for worrisome rashes, moles or lesions  E: NEGATIVE for vision changes or irritation  E/M: NEGATIVE for ear, mouth and throat problems  R: NEGATIVE for significant cough or SOB  B: NEGATIVE for masses, tenderness or discharge  CV: NEGATIVE for chest pain, palpitations or peripheral edema  GI: NEGATIVE for nausea, abdominal pain, heartburn, or change in bowel habits  : NEGATIVE for frequency, dysuria, or hematuria  MUSCULOSKELETAL: Shoulder pain as above  N: NEGATIVE for weakness, dizziness or paresthesias  E: NEGATIVE for temperature intolerance, skin/hair changes  H: NEGATIVE for bleeding problems  P: NEGATIVE for changes in mood or affect    EXAM:                                                    /76 (BP Location: Right arm, Patient Position: Right  "side, Cuff Size: Adult Regular)  Pulse 64  Temp 98.5  F (36.9  C) (Oral)  Resp 16  Ht 1.924 m (6' 3.75\")  Wt 102.8 kg (226 lb 9.6 oz)  SpO2 98%  BMI 27.76 kg/m2    GENERAL APPEARANCE: healthy, alert and no distress     EYES: EOMI,  PERRL     HENT: ear canals and TM's normal and nose and mouth without ulcers or lesions     NECK: no adenopathy, no asymmetry, masses, or scars and thyroid normal to palpation     RESP: lungs clear to auscultation - no rales, rhonchi or wheezes     CV: regular rates and rhythm, normal S1 S2, no S3 or S4 and no murmur, click or rub     ABDOMEN:  soft, nontender, no HSM or masses and bowel sounds normal     MS: extremities normal- no gross deformities noted, no evidence of inflammation in joints, FROM in all extremities.     SKIN: no suspicious lesions or rashes     NEURO: Normal strength and tone, sensory exam grossly normal, mentation intact and speech normal     PSYCH: mentation appears normal. and affect normal/bright     LYMPHATICS: No axillary, cervical, or supraclavicular nodes    DIAGNOSTICS:                                                    No labs or EKG required     Recent Labs   Lab Test  02/14/17   1537  04/27/15   0913   HGB  15.2  15.2   PLT  174  150   NA  141   --    POTASSIUM  3.5   --    CR  1.22   --         IMPRESSION:                                                    Reason for surgery/procedure: Left shoulder impingement syndrome  Diagnosis/reason for consult: Preoperative clearance     The proposed surgical procedure is considered INTERMEDIATE risk.    REVISED CARDIAC RISK INDEX  The patient has the following serious cardiovascular risks for perioperative complications such as (MI, PE, VFib and 3  AV Block):  No serious cardiac risks  INTERPRETATION: 0 risks: Class I (very low risk - 0.4% complication rate)    The patient has the following additional risks for perioperative complications:  No identified additional risks      ICD-10-CM    1. Preop general " physical exam Z01.818        RECOMMENDATIONS:                                                          --Patient is to take all scheduled medications on the day of surgery EXCEPT for modifications listed below.    Anticoagulant or Antiplatelet Medication Use  NSAIDS: Ibuprofen (Motrin):         Stop one week prior to surgery          APPROVAL GIVEN to proceed with proposed procedure, without further diagnostic evaluation       Signed Electronically by: Acacia Petersen MD    Copy of this evaluation report is provided to requesting physician.    [facility] Preop Guidelines

## 2017-10-18 NOTE — MR AVS SNAPSHOT
After Visit Summary   10/18/2017    Bj Philip    MRN: [id number]           Patient Information     Date Of Birth          1970        Visit Information        Provider Department      10/18/2017 2:00 PM Acacia Petersen MD [facility]        Today's Diagnoses     Preop general physical exam    -  1    Erectile dysfunction, unspecified erectile dysfunction type          Care Instructions      Before Your Surgery      Call your surgeon if there is any change in your health. This includes signs of a cold or flu (such as a sore throat, runny nose, cough, rash or fever).    Do not smoke, drink alcohol or take over the counter medicine (unless your surgeon or primary care doctor tells you to) for the 24 hours before and after surgery.    If you take prescribed drugs: Follow your doctor s orders about which medicines to take and which to stop until after surgery.    Eating and drinking prior to surgery: follow the instructions from your surgeon    Take a shower or bath the night before surgery. Use the soap your surgeon gave you to gently clean your skin. If you do not have soap from your surgeon, use your regular soap. Do not shave or scrub the surgery site.  Wear clean pajamas and have clean sheets on your bed.           Follow-ups after your visit        Follow-up notes from your care team     Return if symptoms worsen or fail to improve.      Who to contact     If you have questions or need follow up information about today's clinic visit or your schedule please contact [facility] directly at [phone].  Normal or non-critical lab and imaging results will be communicated to you by MyChart, letter or phone within 4 business days after the clinic has received the results. If you do not hear from us within 7 days, please contact the clinic through MyChart or phone. If you have a critical or abnormal lab result, we will notify you by phone as soon as  "possible.  Submit refill requests through [employer] or call your pharmacy and they will forward the refill request to us. Please allow 3 business days for your refill to be completed.          Additional Information About Your Visit        [employer] Information     [employer] gives you secure access to your electronic health record. If you see a primary care provider, you can also send messages to your care team and make appointments. If you have questions, please call your primary care clinic.  If you do not have a primary care provider, please call [phone] and they will assist you.        Care EveryWhere ID     This is your Care EveryWhere ID. This could be used by other organizations to access your [place] medical records  [id number]        Your Vitals Were     Pulse Temperature Respirations Height Pulse Oximetry BMI (Body Mass Index)    64 98.5  F (36.9  C) (Oral) 16 1.924 m (6' 3.75\") 98% 27.76 kg/m2       Blood Pressure from Last 3 Encounters:   10/18/17 114/76   05/09/17 116/78   03/14/17 112/66    Weight from Last 3 Encounters:   10/18/17 102.8 kg (226 lb 9.6 oz)   05/09/17 103.9 kg (229 lb)   02/14/17 102.7 kg (226 lb 6.4 oz)              Today, you had the following     No orders found for display         Today's Medication Changes          These changes are accurate as of: 10/18/17  3:10 PM.  If you have any questions, ask your nurse or doctor.               Start taking these medicines.        Dose/Directions    sildenafil 20 MG tablet   Commonly known as:  REVATIO   Used for:  Erectile dysfunction, unspecified erectile dysfunction type   Started by:  Acacia Petersen MD        1-3 tabs daily as needed   Quantity:  20 tablet   Refills:  2         Stop taking these medicines if you haven't already. Please contact your care team if you have questions.     atovaquone-proguanil 250-100 MG per tablet   Commonly known as:  MALARONE   Stopped by:  Acacia Petersen MD           ciprofloxacin 500 " MG tablet   Commonly known as:  CIPRO   Stopped by:  Acacia Petersen MD           nabumetone 750 MG tablet   Commonly known as:  RELAFEN   Stopped by:  Acacia Petersen MD           sulindac 200 MG tablet   Commonly known as:  CLINORIL   Stopped by:  Acacia Petersen MD                Where to get your medicines      These medications were sent to [id number] - [facility] [address] [facility]  [address] [address]    Hours:  24-hours Phone:  [phone]     sildenafil 20 MG tablet                Primary Care Provider Office Phone # Fax #    Acacia Petersen [phone] [address] [facility] [address]        Equal Access to Services     [facility]: Hadii aad ku hadasho Soomaali, waaxda luqadaha, qaybta kaalmada adeegyada, waxay idiin hayaan adealbina kharashannan benavides . So [facility] [phone].    ATENCIÓN: Si habla español, tiene a alcaraz disposición servicios gratuitos de asistencia lingüística. Llame al [phone].    We comply with applicable federal civil rights laws and [place] laws. We do not discriminate on the basis of race, color, national origin, age, disability, sex, sexual orientation, or gender identity.            Thank you!     Thank you for choosing [facility]  for your care. Our goal is always to provide you with excellent care. Hearing back from our patients is one way we can continue to improve our services. Please take a few minutes to complete the written survey that you may receive in the mail after your visit with us. Thank you!             Your Updated Medication List - Protect others around you: Learn how to safely use, store and throw away your medicines at www.disposemymeds.org.          This list is accurate as of: 10/18/17  3:10 PM.  Always use your most recent med list.                   Brand Name Dispense Instructions for use Diagnosis     fexofenadine-pseudoePHEDrine  MG per 12 hr tablet    ALLEGRA-D    180 tablet    Take 1 tablet by mouth 2 times daily    Seasonal allergic rhinitis, unspecified allergic rhinitis trigger       fluticasone 50 MCG/ACT spray    FLONASE    48 g    Spray 1-2 sprays into both nostrils daily    Seasonal allergic rhinitis, unspecified allergic rhinitis trigger       HYDROcodone-acetaminophen 5-325 MG per tablet    NORCO    30 tablet    Take 1-2 tablets by mouth every 6 hours as needed for moderate to severe pain    Left shoulder pain, unspecified chronicity       ibuprofen 800 MG tablet    ADVIL/MOTRIN    90 tablet    Take 1 tablet (800 mg) by mouth 3 times daily as needed for pain    Radial styloid tenosynovitis of right hand       sildenafil 20 MG tablet    REVATIO    20 tablet    1-3 tabs daily as needed    Erectile dysfunction, unspecified erectile dysfunction type

## 2017-10-23 ENCOUNTER — MYC MEDICAL ADVICE (OUTPATIENT)
Dept: FAMILY MEDICINE | Facility: CLINIC | Age: 47
End: 2017-10-23

## 2017-11-13 ENCOUNTER — THERAPY VISIT (OUTPATIENT)
Dept: PHYSICAL THERAPY | Facility: CLINIC | Age: 47
End: 2017-11-13
Payer: OTHER MISCELLANEOUS

## 2017-11-13 DIAGNOSIS — M25.512 ACUTE PAIN OF LEFT SHOULDER: Primary | ICD-10-CM

## 2017-11-13 DIAGNOSIS — M19.012 GLENOHUMERAL ARTHRITIS, LEFT: ICD-10-CM

## 2017-11-13 DIAGNOSIS — Z47.89 AFTERCARE FOLLOWING SURGERY OF THE MUSCULOSKELETAL SYSTEM: ICD-10-CM

## 2017-11-13 PROCEDURE — 97110 THERAPEUTIC EXERCISES: CPT | Mod: GP | Performed by: PHYSICAL THERAPIST

## 2017-11-13 PROCEDURE — 97112 NEUROMUSCULAR REEDUCATION: CPT | Mod: GP | Performed by: PHYSICAL THERAPIST

## 2017-11-13 PROCEDURE — 97161 PT EVAL LOW COMPLEX 20 MIN: CPT | Mod: GP | Performed by: PHYSICAL THERAPIST

## 2017-11-13 NOTE — PROGRESS NOTES
"Subjective:    Patient is a 47 year old male presenting with rehab right shoulder hpi and rehab left shoulder hpi. The history is provided by the patient. No  was used.   Bj Phliip is a 47 year old male with a left shoulder condition.  Condition occurred with:  Repetition/overuse.  Condition occurred: at work.  This is a new condition  Pt was lifting something overhead ( a backseat cover) and his shoulder \"rolled back\" and he kept working. He just dealt with it for a long time. He reports a bone \"chipped off\" inside and he had the first surgery in Sept fo 2010 to remove the bone fragment and performed some debridement. He reports that in May 2017, pt began to feel shoulder pain again--burning sensation that worsened when he lifted and pushed with his L arm. He went to see Dr. Branham in June of 2017 and he tried some injections that weren't really helpful after having an MRI. Pt reports that he will need an arthroplasty someday and attempted another \"clean out surgery\" to buy him some time. He had his L shoulder debrided extensively, the bucket handle tear of the LHOB at it's insertion was removed and debrided as well as the GH joint and the rotator interval was released. Pt has full thickness chondral loss as well. PMH: remarkable for SAD, biceps tenodesis on R shoulder in 2014..    Patient reports pain:  Anterior and in the joint.  Radiates to:  Upper arm, shoulder and cervical.  Pain is described as aching and burning and is constant (constant ache is a 3-4/10 and intermittent pain is a 9/10) and reported as 2/10.  Associated symptoms:  Painful arc, loss of strength and loss of motion/stiffness. Pain is worse during the night (pt is waking 2x/night).  Symptoms are exacerbated by using arm behind back, using arm overhead, using arm at shoulder level, lifting, carrying and lying on extremity and relieved by analgesics.  Since onset symptoms are gradually worsening (was better right after " surgery and is now worse).  Special tests:  X-ray and MRI.  Previous treatment includes surgery.  There was moderate improvement following previous treatment.  General health as reported by patient is good.  Pertinent medical history includes:  None.  Medical allergies: no.  Other surgeries include:  Orthopedic surgery (R shoulder in 2014 and L shoulder inn 2010).  Current medications:  Anti-inflammatory and pain medication.  Current occupation is  for [employer]  .  Patient is currently not working due to present treatment problem.  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    Barriers include:  None as reported by the patient.    Red flags:  None as reported by the patient.                        Objective:    System                   Shoulder Evaluation:  ROM:  AROM:    Flexion:  Left:  74    Right:  161    Abduction:  Left: 50   Right:  162      External Rotation:  Left:  7    Right:  88            Extension/Internal Rotation:  Left:  Greater trochanter    Right:  T5    PROM:    Flexion:  Left:  105 (supine wand)          Abduction:  Left:  80 (standing wand)          External Rotation:  Left:  20 (supine wand)                        Strength:  : not assessed due to lack of AROM and pain with AROM.                                                               General     ROS    Assessment/Plan:      Patient is a 47 year old male with left side shoulder complaints.    Patient has the following significant findings with corresponding treatment plan.                Diagnosis 1:  L shoulder extensive debridement of GH joint, biceps anchor, SA space and RI release   Pain -  hot/cold therapy, manual therapy, self management, education and home program  Decreased ROM/flexibility - manual therapy and therapeutic exercise  Decreased joint mobility - manual therapy and therapeutic exercise  Decreased strength - therapeutic exercise and therapeutic  activities  Impaired muscle performance - neuro re-education  Decreased function - therapeutic activities    Therapy Evaluation Codes:   1) History comprised of:   Personal factors that impact the plan of care:      Past/current experiences.    Comorbidity factors that impact the plan of care are:      None.     Medications impacting care: Anti-inflammatory and Pain.  2) Examination of Body Systems comprised of:   Body structures and functions that impact the plan of care:      Shoulder.   Activity limitations that impact the plan of care are:      Dressing, Lifting, Throwing and Sleeping.  3) Clinical presentation characteristics are:   Stable/Uncomplicated.  4) Decision-Making    Low complexity using standardized patient assessment instrument and/or measureable assessment of functional outcome.  Cumulative Therapy Evaluation is: Low complexity.    Previous and current functional limitations:  (See Goal Flow Sheet for this information)    Short term and Long term goals: (See Goal Flow Sheet for this information)     Communication ability:  Patient appears to be able to clearly communicate and understand verbal and written communication and follow directions correctly.  Treatment Explanation - The following has been discussed with the patient:   RX ordered/plan of care  Anticipated outcomes  Possible risks and side effects  This patient would benefit from PT intervention to resume normal activities.   Rehab potential is good.    Frequency:  1 X week, once daily  Duration:  for 10 weeks  Discharge Plan:  Achieve all LTG.  Independent in home treatment program.  Reach maximal therapeutic benefit.    Please refer to the daily flowsheet for treatment today, total treatment time and time spent performing 1:1 timed codes.

## 2017-11-13 NOTE — MR AVS SNAPSHOT
After Visit Summary   11/13/2017    Bj Philip    MRN: [id number]           Patient Information     Date Of Birth          1970        Visit Information        Provider Department      11/13/2017 11:20 AM Oneida Lopez, PT [facility]        Today's Diagnoses     Acute pain of left shoulder    -  1    Aftercare following surgery of the musculoskeletal system        Glenohumeral arthritis, left           Follow-ups after your visit        Your next 10 appointments already scheduled     Nov 21, 2017 10:30 AM CST   DEMETRICE Extremity with Roberto Beltran, PT   [facility]    [address]  [facility] [address]   [phone]            Nov 30, 2017  2:50 PM CST   DEMETRICE Extremity with Oneida Lopez, PT   [facility]    [address]  [facility] [address]   [phone]            Dec 07, 2017 12:10 PM CST   DEMETRICE Extremity with Oneida Lopez, PT   [facility]    [address]  [facility] [address]   [phone]            Dec 14, 2017  4:10 PM CST   DEMETRICE Extremity with Oneida Lopez, PT   [facility]    [address]  [facility] [address]   [phone]              Who to contact     If you have questions or need follow up information about today's clinic visit or your schedule please contact [facility] THERAP[facility] directly at [phone].  Normal or non-critical lab and imaging results will be communicated to you by MyChart, letter or phone within 4 business days after the clinic has received the results. If you do not hear from us within 7 days, please contact the  clinic through [employer] or phone. If you have a critical or abnormal lab result, we will notify you by phone as soon as possible.  Submit refill requests through [employer] or call your pharmacy and they will forward the refill request to us. Please allow 3 business days for your refill to be completed.          Additional Information About Your Visit        [employer] Information     [employer] gives you secure access to your electronic health record. If you see a primary care provider, you can also send messages to your care team and make appointments. If you have questions, please call your primary care clinic.  If you do not have a primary care provider, please call [phone] and they will assist you.        Care EveryWhere ID     This is your Care EveryWhere ID. This could be used by other organizations to access your [place] medical records  [id number]         Blood Pressure from Last 3 Encounters:   10/18/17 114/76   05/09/17 116/78   03/14/17 112/66    Weight from Last 3 Encounters:   10/18/17 102.8 kg (226 lb 9.6 oz)   05/09/17 103.9 kg (229 lb)   02/14/17 102.7 kg (226 lb 6.4 oz)              We Performed the Following     HC PT EVAL, LOW COMPLEXITY     DEMETRICE INITIAL EVAL REPORT     NEUROMUSCULAR RE-EDUCATION     THERAPEUTIC EXERCISES        Primary Care Provider Office Phone # Fax #    Acacia Washington Petersen [phone] [address] [facility] [facility] [address]        Equal Access to Services     [facility] : Hadii aad ku hadasho Soomaali, waaxda luqadaha, qaybta kaalmada adeegyada, wes osei hayjessy benavides . So [facility] [phone].    ATENCIÓN: Si habla español, tiene a alcaraz disposición servicios gratuitos de asistencia lingüística. Llame al [phone].    We comply with applicable federal civil rights laws and [place] laws. We do not discriminate on the basis of race, color, national origin, age, disability, sex, sexual orientation, or gender identity.             Thank you!     Thank you for choosing [facility]  for your care. Our goal is always to provide you with excellent care. Hearing back from our patients is one way we can continue to improve our services. Please take a few minutes to complete the written survey that you may receive in the mail after your visit with us. Thank you!             Your Updated Medication List - Protect others around you: Learn how to safely use, store and throw away your medicines at www.disposemymeds.org.          This list is accurate as of: 11/13/17 11:59 PM.  Always use your most recent med list.                   Brand Name Dispense Instructions for use Diagnosis    fexofenadine-pseudoePHEDrine  MG per 12 hr tablet    ALLEGRA-D    180 tablet    Take 1 tablet by mouth 2 times daily    Seasonal allergic rhinitis, unspecified allergic rhinitis trigger       fluticasone 50 MCG/ACT spray    FLONASE    48 g    Spray 1-2 sprays into both nostrils daily    Seasonal allergic rhinitis, unspecified allergic rhinitis trigger       HYDROcodone-acetaminophen 5-325 MG per tablet    NORCO    30 tablet    Take 1-2 tablets by mouth every 6 hours as needed for moderate to severe pain    Left shoulder pain, unspecified chronicity       ibuprofen 800 MG tablet    ADVIL/MOTRIN    90 tablet    Take 1 tablet (800 mg) by mouth 3 times daily as needed for pain    Radial styloid tenosynovitis of right hand       sildenafil 20 MG tablet    REVATIO    20 tablet    1-3 tabs daily as needed    Erectile dysfunction, unspecified erectile dysfunction type

## 2017-11-15 PROBLEM — Z47.89 AFTERCARE FOLLOWING SURGERY OF THE MUSCULOSKELETAL SYSTEM: Status: ACTIVE | Noted: 2017-11-15

## 2017-11-21 ENCOUNTER — THERAPY VISIT (OUTPATIENT)
Dept: PHYSICAL THERAPY | Facility: CLINIC | Age: 47
End: 2017-11-21
Payer: OTHER MISCELLANEOUS

## 2017-11-21 DIAGNOSIS — Z47.89 AFTERCARE FOLLOWING SURGERY OF THE MUSCULOSKELETAL SYSTEM: ICD-10-CM

## 2017-11-21 DIAGNOSIS — M19.012 GLENOHUMERAL ARTHRITIS, LEFT: ICD-10-CM

## 2017-11-21 DIAGNOSIS — M25.512 ACUTE PAIN OF LEFT SHOULDER: ICD-10-CM

## 2017-11-21 PROCEDURE — 97140 MANUAL THERAPY 1/> REGIONS: CPT | Mod: GP | Performed by: PHYSICAL THERAPIST

## 2017-11-21 PROCEDURE — 97110 THERAPEUTIC EXERCISES: CPT | Mod: GP | Performed by: PHYSICAL THERAPIST

## 2017-11-21 NOTE — MR AVS SNAPSHOT
After Visit Summary   11/21/2017    Bj Philip    MRN: [id number]           Patient Information     Date Of Birth          1970        Visit Information        Provider Department      11/21/2017 10:30 AM Roberto Beltran, PT [facility]        Today's Diagnoses     Acute pain of left shoulder        Aftercare following surgery of the musculoskeletal system        Glenohumeral arthritis, left           Follow-ups after your visit        Your next 10 appointments already scheduled     Nov 30, 2017  2:50 PM CST   DEMETRICE Extremity with Oneida Lopez, PT   [facility] ([facility])    [address]  [facility] [address]   [phone]            Dec 07, 2017 12:10 PM CST   DEMETRICE Extremity with Oneida Lopez, PT   [facility] ([facility])    [address]  [facility] [address]   [phone]            Dec 14, 2017  4:10 PM CST   DEMETRICE Extremity with Oneida Lopez, PT   [facility] ([facility])    [address]  [facility] [address]   [phone]              Who to contact     If you have questions or need follow up information about today's clinic visit or your schedule please contact [facility] directly at [phone].  Normal or non-critical lab and imaging results will be communicated to you by MyChart, letter or phone within 4 business days after the clinic has received the results. If you do not hear from us within 7 days, please contact the clinic through [employer] or phone. If you have a critical or abnormal lab result, we will notify you by phone as soon as possible.  Submit refill requests through [employer] or call your pharmacy and they will forward the refill request to us.  Please allow 3 business days for your refill to be completed.          Additional Information About Your Visit        MyChart Information     [employer] gives you secure access to your electronic health record. If you see a primary care provider, you can also send messages to your care team and make appointments. If you have questions, please call your primary care clinic.  If you do not have a primary care provider, please call [phone] and they will assist you.        Care EveryWhere ID     This is your Care EveryWhere ID. This could be used by other organizations to access your [place] medical records  [id number]         Blood Pressure from Last 3 Encounters:   10/18/17 114/76   05/09/17 116/78   03/14/17 112/66    Weight from Last 3 Encounters:   10/18/17 102.8 kg (226 lb 9.6 oz)   05/09/17 103.9 kg (229 lb)   02/14/17 102.7 kg (226 lb 6.4 oz)              We Performed the Following     MANUAL THER TECH,1+REGIONS,EA 15 MIN     THERAPEUTIC EXERCISES        Primary Care Provider Office Phone # Fax #    Acacia Washington Petersen [phone] [address] [facility] [address]        Equal Access to Services     ANDRA CHILDERS : Hadii aad ku hadasho Sovikkiali, waaxda luqadaha, qaybta kaalmada adeegyada, wes alba. So [facility] [phone].    ATENCIÓN: Si habla español, tiene a alcaraz disposición servicios gratuitos de asistencia lingüística. Ll[facility] [phone].    We comply with applicable federal civil rights laws and [place] laws. We do not discriminate on the basis of race, color, national origin, age, disability, sex, sexual orientation, or gender identity.            Thank you!     Thank you for choosing [facility] PHYSICAL THERAP[facility]  for your care. Our goal is always to provide you with excellent care. Hearing back from our patients is one way we can continue to improve our services. Please take a few minutes to  complete the written survey that you may receive in the mail after your visit with us. Thank you!             Your Updated Medication List - Protect others around you: Learn how to safely use, store and throw away your medicines at www.disposemymeds.org.          This list is accurate as of: 11/21/17 11:05 AM.  Always use your most recent med list.                   Brand Name Dispense Instructions for use Diagnosis    fexofenadine-pseudoePHEDrine  MG per 12 hr tablet    ALLEGRA-D    180 tablet    Take 1 tablet by mouth 2 times daily    Seasonal allergic rhinitis, unspecified allergic rhinitis trigger       fluticasone 50 MCG/ACT spray    FLONASE    48 g    Spray 1-2 sprays into both nostrils daily    Seasonal allergic rhinitis, unspecified allergic rhinitis trigger       HYDROcodone-acetaminophen 5-325 MG per tablet    NORCO    30 tablet    Take 1-2 tablets by mouth every 6 hours as needed for moderate to severe pain    Left shoulder pain, unspecified chronicity       ibuprofen 800 MG tablet    ADVIL/MOTRIN    90 tablet    Take 1 tablet (800 mg) by mouth 3 times daily as needed for pain    Radial styloid tenosynovitis of right hand       sildenafil 20 MG tablet    REVATIO    20 tablet    1-3 tabs daily as needed    Erectile dysfunction, unspecified erectile dysfunction type

## 2017-11-21 NOTE — PROGRESS NOTES
Subjective:    HPI                    Objective:    System    Physical Exam    General     ROS    Assessment/Plan:      SUBJECTIVE  Subjective changes as noted by pt:  Doing OK, but still feels pain at nigh (sleepingt.  Describes it as if a needele is being pushed into his shoulder. Performing wand exercises 2x/day.      Current pain level: 3/10     Changes in function:  None     Adverse reaction to treatment or activity:  None    OBJECTIVE  Changes in objective findings:  Yes, L shoulder AAROM (wand) flexion supine 131, abd 123, ER (supine) 58, extension  123        ASSESSMENT  Abdullai continues to require intervention to meet STG and LTG's: PT  Patient is progressing as expected.  Response to therapy has shown an improvement in  pain level and ROM   Progress made towards STG/LTG?  None    PLAN  Continue current treatment plan until patient demonstrates readiness to progress to higher level exercises.    PTA/ATC plan:  N/A    Please refer to the daily flowsheet for treatment today, total treatment time and time spent performing 1:1 timed codes.

## 2017-11-30 ENCOUNTER — THERAPY VISIT (OUTPATIENT)
Dept: PHYSICAL THERAPY | Facility: CLINIC | Age: 47
End: 2017-11-30
Payer: OTHER MISCELLANEOUS

## 2017-11-30 DIAGNOSIS — M25.512 ACUTE PAIN OF LEFT SHOULDER: ICD-10-CM

## 2017-11-30 DIAGNOSIS — Z47.89 AFTERCARE FOLLOWING SURGERY OF THE MUSCULOSKELETAL SYSTEM: ICD-10-CM

## 2017-11-30 DIAGNOSIS — M19.012 GLENOHUMERAL ARTHRITIS, LEFT: ICD-10-CM

## 2017-11-30 PROCEDURE — 97110 THERAPEUTIC EXERCISES: CPT | Mod: GP | Performed by: PHYSICAL THERAPIST

## 2017-11-30 PROCEDURE — 97140 MANUAL THERAPY 1/> REGIONS: CPT | Mod: GP | Performed by: PHYSICAL THERAPIST

## 2017-11-30 NOTE — PROGRESS NOTES
Subjective:    HPI                    Objective:    System    Physical Exam    General     ROS    Assessment/Plan:      SUBJECTIVE  Subjective changes as noted by pt:  He has been having more pain and feels like the meds aren't helping. He doesn't have as much catching now compared to before when he does the pendulum. For the last week, he has had 9/10 pain at night and cannot seem to get comfortable to sleep.      Current pain level: 5/10  When he moves and 0/10 at rest   Changes in function:  Yes (See Goal flowsheet attached for changes in current functional level)     Adverse reaction to treatment or activity:  None    OBJECTIVE  Changes in objective findings:  Yes, L shoulder AAROM:    Standing wand jvrqiyn=694  ABD standing=133  EXT=55  ER on wall=46        ASSESSMENT  Abdullai continues to require intervention to meet STG and LTG's: PT  Patient's symptoms are resolving.  Patient is progressing as expected.  Response to therapy has shown an improvement in  pain level, ROM  and flexibility  Progress made towards STG/LTG?  Yes (See Goal flowsheet attached for updates on achievement of STG and LTG)    PLAN  Current treatment program is being advanced to more complex exercises.    PTA/ATC plan:  N/A    Please refer to the daily flowsheet for treatment today, total treatment time and time spent performing 1:1 timed codes.

## 2017-11-30 NOTE — MR AVS SNAPSHOT
After Visit Summary   11/30/2017    jB Philip    MRN: [id number]           Patient Information     Date Of Birth          1970        Visit Information        Provider Department      11/30/2017 2:50 PM Oneida Lopez, PT [facility] Physical Therapy        Today's Diagnoses     Acute pain of left shoulder        Aftercare following surgery of the musculoskeletal system        Glenohumeral arthritis, left           Follow-ups after your visit        Your next 10 appointments already scheduled     Dec 07, 2017 12:10 PM CST   DEMETRICE Extremity with Oneida Lopez PT   [facility] Physical Therapy ([facility])    [address]  [facility] [address]   [phone]            Dec 14, 2017  4:10 PM CST   DEMETRICE Extremity with Oneida Lopez PT   [facility] Therapy ([facility])    [address]  [facility] [id number] [phone]              Who to contact     If you have questions or need follow up information about today's clinic visit or your schedule please contact [facility] PHYSICAL THERAPY directly at [phone].  Normal or non-critical lab and imaging results will be communicated to you by MyChart, letter or phone within 4 business days after the clinic has received the results. If you do not hear from us within 7 days, please contact the clinic through SpanDeXhart or phone. If you have a critical or abnormal lab result, we will notify you by phone as soon as possible.  Submit refill requests through [email or web address] or call your pharmacy and they will forward the refill request to us. Please allow 3 business days for your refill to be completed.          Additional Information About Your Visit        [employer] Information     [email or web address] gives you secure access to your electronic health record. If you see a primary care  provider, you can also send messages to your care team and make appointments. If you have questions, please call your primary care clinic.  If you do not have a primary care provider, please call [phone] and they will assist you.        Care EveryWhere ID     This is your Care EveryWhere ID. This could be used by other organizations to access your [place] medical records  [id number]         Blood Pressure from Last 3 Encounters:   10/18/17 114/76   05/09/17 116/78   03/14/17 112/66    Weight from Last 3 Encounters:   10/18/17 102.8 kg (226 lb 9.6 oz)   05/09/17 103.9 kg (229 lb)   02/14/17 102.7 kg (226 lb 6.4 oz)              We Performed the Following     HOT OR COLD PACKS THERAPY     MANUAL THER TECH,1+REGIONS,EA 15 MIN     THERAPEUTIC EXERCISES        Primary Care Provider Office Phone # Fax #    Acacia Washington Petersen [phone] [address] [facility] [address]        Equal Access to Services     [facility] : Hadii aad ku hadasho Soomaali, waaxda luqadaha, qaybta kaalmada adeegyada, waxay farnaz hayjessy benavides . So [facility] [phone].    ATENCIÓN: Si habla español, tiene a alcaraz disposición servicios gratuitos de asistencia lingüística. Ll[facility] [phone].    We comply with applicable federal civil rights laws and [place] laws. We do not discriminate on the basis of race, color, national origin, age, disability, sex, sexual orientation, or gender identity.            Thank you!     Thank you for choosing [facility] PHYSICAL THERAP[facility]  for your care. Our goal is always to provide you with excellent care. Hearing back from our patients is one way we can continue to improve our services. Please take a few minutes to complete the written survey that you may receive in the mail after your visit with us. Thank you!             Your Updated Medication List - Protect others around you: Learn how to safely use, store and throw  away your medicines at www.disposemymeds.org.          This list is accurate as of: 11/30/17  3:31 PM.  Always use your most recent med list.                   Brand Name Dispense Instructions for use Diagnosis    fexofenadine-pseudoePHEDrine  MG per 12 hr tablet    ALLEGRA-D    180 tablet    Take 1 tablet by mouth 2 times daily    Seasonal allergic rhinitis, unspecified allergic rhinitis trigger       fluticasone 50 MCG/ACT spray    FLONASE    48 g    Spray 1-2 sprays into both nostrils daily    Seasonal allergic rhinitis, unspecified allergic rhinitis trigger       HYDROcodone-acetaminophen 5-325 MG per tablet    NORCO    30 tablet    Take 1-2 tablets by mouth every 6 hours as needed for moderate to severe pain    Left shoulder pain, unspecified chronicity       ibuprofen 800 MG tablet    ADVIL/MOTRIN    90 tablet    Take 1 tablet (800 mg) by mouth 3 times daily as needed for pain    Radial styloid tenosynovitis of right hand       sildenafil 20 MG tablet    REVATIO    20 tablet    1-3 tabs daily as needed    Erectile dysfunction, unspecified erectile dysfunction type

## 2017-12-04 ENCOUNTER — OFFICE VISIT (OUTPATIENT)
Dept: FAMILY MEDICINE | Facility: CLINIC | Age: 47
End: 2017-12-04
Payer: COMMERCIAL

## 2017-12-04 ENCOUNTER — RADIANT APPOINTMENT (OUTPATIENT)
Dept: GENERAL RADIOLOGY | Facility: CLINIC | Age: 47
End: 2017-12-04
Attending: FAMILY MEDICINE
Payer: COMMERCIAL

## 2017-12-04 VITALS
DIASTOLIC BLOOD PRESSURE: 80 MMHG | OXYGEN SATURATION: 96 % | BODY MASS INDEX: 27.7 KG/M2 | HEART RATE: 66 BPM | RESPIRATION RATE: 16 BRPM | TEMPERATURE: 98.6 F | WEIGHT: 227.5 LBS | HEIGHT: 76 IN | SYSTOLIC BLOOD PRESSURE: 108 MMHG

## 2017-12-04 DIAGNOSIS — R09.89 CHEST CONGESTION: ICD-10-CM

## 2017-12-04 DIAGNOSIS — R09.89 CHEST CONGESTION: Primary | ICD-10-CM

## 2017-12-04 PROCEDURE — 71020 XR CHEST 2 VW: CPT

## 2017-12-04 PROCEDURE — 99214 OFFICE O/P EST MOD 30 MIN: CPT | Performed by: FAMILY MEDICINE

## 2017-12-04 RX ORDER — AZITHROMYCIN 250 MG/1
TABLET, FILM COATED ORAL
Qty: 6 TABLET | Refills: 0 | Status: SHIPPED | OUTPATIENT
Start: 2017-12-04 | End: 2018-02-19

## 2017-12-04 ASSESSMENT — PAIN SCALES - GENERAL: PAINLEVEL: NO PAIN (0)

## 2017-12-04 NOTE — MR AVS SNAPSHOT
After Visit Summary   12/4/2017    Bj Philip    MRN: [id number]           Patient Information     Date Of Birth          1970        Visit Information        Provider Department      12/4/2017 9:40 AM Acacia Petersen MD [facility]        Today's Diagnoses     Chest congestion    -  1       Follow-ups after your visit        Follow-up notes from your care team     Return if symptoms worsen or fail to improve.      Your next 10 appointments already scheduled     Dec 07, 2017 12:10 PM CST   DEMETRICE Extremity with Oneida Lopez PT   [facility] ([facility])    [address]  [facility] [address]   [phone]            Dec 14, 2017  4:10 PM CST   DEMETRICE Extremity with Oneida Lopez PT   [facility] ([facility])    [address]  [facility] [address] [phone]              Who to contact     If you have questions or need follow up information about today's clinic visit or your schedule please contact [facility] directly at [phone].  Normal or non-critical lab and imaging results will be communicated to you by MyChart, letter or phone within 4 business days after the clinic has received the results. If you do not hear from us within 7 days, please contact the clinic through MyChart or phone. If you have a critical or abnormal lab result, we will notify you by phone as soon as possible.  Submit refill requests through [employer] or call your pharmacy and they will forward the refill request to us. Please allow 3 business days for your refill to be completed.          Additional Information About Your Visit        MyChart Information     [employer] gives you secure access to your electronic health record. If you see a primary care provider, you can also send messages to your care team and make appointments. If you  "have questions, please call your primary care clinic.  If you do not have a primary care provider, please call [phone] and they will assist you.        Care EveryWhere ID     This is your Care EveryWhere ID. This could be used by other organizations to access your [place] medical records  [id number]        Your Vitals Were     Pulse Temperature Respirations Height Pulse Oximetry BMI (Body Mass Index)    66 98.6  F (37  C) (Oral) 16 1.924 m (6' 3.75\") 96% 27.88 kg/m2       Blood Pressure from Last 3 Encounters:   12/04/17 108/80   10/18/17 114/76   05/09/17 116/78    Weight from Last 3 Encounters:   12/04/17 103.2 kg (227 lb 8 oz)   10/18/17 102.8 kg (226 lb 9.6 oz)   05/09/17 103.9 kg (229 lb)                 Today's Medication Changes          These changes are accurate as of: 12/4/17 10:44 AM.  If you have any questions, ask your nurse or doctor.               Start taking these medicines.        Dose/Directions    azithromycin 250 MG tablet   Commonly known as:  ZITHROMAX   Used for:  Chest congestion   Started by:  Acacia Petersen MD        Two tabs today.  One tab daily x 4 days.   Quantity:  6 tablet   Refills:  0            Where to get your medicines      These medications were sent to [facility] [address] [place] [address] [address]  [address] [facility] [address]    Hours:  24-hours Phone:  [phone]     azithromycin 250 MG tablet                Primary Care Provider Office Phone # Fax #    Acacia Petersen [phone] [address] [facility] [address]        Equal Access to Services     [facility] LISETH : Ike Gomez, waaxda luqadaha, qaybta kaalmada graemeyaestrada, wes alba. So [facility] [phone].    ATENCIÓN: Si habla español, tiene a alcaraz disposición servicios gratuitos de asistencia lingüística. Llame al [phone].    We comply with " applicable federal civil rights laws and [place] laws. We do not discriminate on the basis of race, color, national origin, age, disability, sex, sexual orientation, or gender identity.            Thank you!     Thank you for choosing [facility]  for your care. Our goal is always to provide you with excellent care. Hearing back from our patients is one way we can continue to improve our services. Please take a few minutes to complete the written survey that you may receive in the mail after your visit with us. Thank you!             Your Updated Medication List - Protect others around you: Learn how to safely use, store and throw away your medicines at www.disposemymeds.org.          This list is accurate as of: 12/4/17 10:44 AM.  Always use your most recent med list.                   Brand Name Dispense Instructions for use Diagnosis    azithromycin 250 MG tablet    ZITHROMAX    6 tablet    Two tabs today.  One tab daily x 4 days.    Chest congestion       fexofenadine-pseudoePHEDrine  MG per 12 hr tablet    ALLEGRA-D    180 tablet    Take 1 tablet by mouth 2 times daily    Seasonal allergic rhinitis, unspecified allergic rhinitis trigger       fluticasone 50 MCG/ACT spray    FLONASE    48 g    Spray 1-2 sprays into both nostrils daily    Seasonal allergic rhinitis, unspecified allergic rhinitis trigger       HYDROcodone-acetaminophen 5-325 MG per tablet    NORCO    30 tablet    Take 1-2 tablets by mouth every 6 hours as needed for moderate to severe pain    Left shoulder pain, unspecified chronicity       ibuprofen 800 MG tablet    ADVIL/MOTRIN    90 tablet    Take 1 tablet (800 mg) by mouth 3 times daily as needed for pain    Radial styloid tenosynovitis of right hand       sildenafil 20 MG tablet    REVATIO    20 tablet    1-3 tabs daily as needed    Erectile dysfunction, unspecified erectile dysfunction type

## 2017-12-04 NOTE — PROGRESS NOTES
"  SUBJECTIVE:   Bj Philip is a 47 year old male who presents to clinic today for the following health issues:      1. Pt states 4 mornings ago he woke up while swallowing his phlegm - the phlegm now feels \"stuck\" in chest   - pt states has noted chills, weak, tired, nausea    SUBJECTIVE:  Here today with the above symptoms. Began without any preceding illness whatsoever. He woke in the morning feeling as though there is something stuck in his chest. Denies any fever but at times he's felt chills and tired as if he's coming down with something. There is a dull ache just right of sternum in his chest especially with breathing and coughing. He feels though there is something in his chest but he can't really get it out. Denies any sinus symptoms or postnasal drip. No sore throat. No GI symptoms. Never had anything like this in the past. He's worried about having swallowed something in the middle of the night    Review of systems otherwise negative.  Past medical, family, and social history reviewed and updated in chart.    OBJECTIVE:  /80 (BP Location: Right arm, Patient Position: Right side, Cuff Size: Adult Regular)  Pulse 66  Temp 98.6  F (37  C) (Oral)  Resp 16  Ht 1.924 m (6' 3.75\")  Wt 103.2 kg (227 lb 8 oz)  SpO2 96%  BMI 27.88 kg/m2  Alert, pleasant, upbeat, and in no apparent discomfort.  Ears normal. Throat and pharynx normal. Neck supple. No adenopathy or masses in the neck or supraclavicular regions. Sinuses non tender.  S1 and S2 normal, no murmurs, clicks, gallops or rubs. Regular rate and rhythm. Chest is clear; no wheezes or rales. No edema or JVD.  Past labs reviewed with the patient.   XRAY - my independent reading of the films showed clear lung fields     ASSESSMENT / PLAN:  (R09.89) Chest congestion  (primary encounter diagnosis)  Comment: Suspect bronchitis. There is nothing really to make me worry about aspiration pneumonia per se based on his exam. It may simply be accidental " the timing of this. Discussed options and will go ahead and treat with antibiotics and should resolve quickly  Plan: XR Chest 2 Views, azithromycin (ZITHROMAX) 250         MG tablet        Discussed mechanism of action of the proposed medication, as well as potential effects, both good and bad.  Patient expressed understanding and agreed with treatment.     Follow up as needed   S. Washington Petersen MD    (Chart documentation completed in part with Dragon voice-recognition software.  Even though reviewed some grammatical, spelling, and word errors may remain.)

## 2017-12-04 NOTE — NURSING NOTE
"Chief Complaint   Patient presents with     Nose Problem       Initial /80 (BP Location: Right arm, Patient Position: Right side, Cuff Size: Adult Regular)  Pulse 66  Temp 98.6  F (37  C) (Oral)  Resp 16  Ht 1.924 m (6' 3.75\")  Wt 103.2 kg (227 lb 8 oz)  SpO2 96%  BMI 27.88 kg/m2 Estimated body mass index is 27.88 kg/(m^2) as calculated from the following:    Height as of this encounter: 1.924 m (6' 3.75\").    Weight as of this encounter: 103.2 kg (227 lb 8 oz).  Medication Reconciliation: complete     Will Niharika QUICK      "

## 2017-12-07 ENCOUNTER — THERAPY VISIT (OUTPATIENT)
Dept: PHYSICAL THERAPY | Facility: CLINIC | Age: 47
End: 2017-12-07
Payer: OTHER MISCELLANEOUS

## 2017-12-07 DIAGNOSIS — Z47.89 AFTERCARE FOLLOWING SURGERY OF THE MUSCULOSKELETAL SYSTEM: ICD-10-CM

## 2017-12-07 DIAGNOSIS — M19.012 GLENOHUMERAL ARTHRITIS, LEFT: ICD-10-CM

## 2017-12-07 DIAGNOSIS — M25.512 ACUTE PAIN OF LEFT SHOULDER: ICD-10-CM

## 2017-12-07 PROCEDURE — 97140 MANUAL THERAPY 1/> REGIONS: CPT | Mod: GP | Performed by: PHYSICAL THERAPIST

## 2017-12-07 PROCEDURE — 97110 THERAPEUTIC EXERCISES: CPT | Mod: GP | Performed by: PHYSICAL THERAPIST

## 2017-12-07 NOTE — MR AVS SNAPSHOT
After Visit Summary   12/7/2017    Bj Philip    MRN: [id number]           Patient Information     Date Of Birth          1970        Visit Information        Provider Department      12/7/2017 12:10 PM Oneida Lopez PT [facility]        Today's Diagnoses     Acute pain of left shoulder        Aftercare following surgery of the musculoskeletal system        Glenohumeral arthritis, left           Follow-ups after your visit        Your next 10 appointments already scheduled     Dec 14, 2017  4:10 PM CST   DEMETRICE Extremity with Oneida Lopez PT   [facility] ([facility])    [address]  [facility] [id number] [phone]              Who to contact     If you have questions or need follow up information about today's clinic visit or your schedule please contact [facility] PH[facility] directly at [phone].  Normal or non-critical lab and imaging results will be communicated to you by Manifest Digitalhart, letter or phone within 4 business days after the clinic has received the results. If you do not hear from us within 7 days, please contact the clinic through S[employer] or phone. If you have a critical or abnormal lab result, we will notify you by phone as soon as possible.  Submit refill requests through [employer] or call your pharmacy and they will forward the refill request to us. Please allow 3 business days for your refill to be completed.          Additional Information About Your Visit        MyChart Information     [employer] gives you secure access to your electronic health record. If you see a primary care provider, you can also send messages to your care team and make appointments. If you have questions, please call your primary care clinic.  If you do not have a primary care provider, please call [phone] and they will assist  you.        Care EveryWhere ID     This is your Care EveryWhere ID. This could be used by other organizations to access your [place] medical records  [id number]         Blood Pressure from Last 3 Encounters:   12/04/17 108/80   10/18/17 114/76   05/09/17 116/78    Weight from Last 3 Encounters:   12/04/17 103.2 kg (227 lb 8 oz)   10/18/17 102.8 kg (226 lb 9.6 oz)   05/09/17 103.9 kg (229 lb)              We Performed the Following     MANUAL THER TECH,1+REGIONS,EA 15 MIN     THERAPEUTIC EXERCISES        Primary Care Provider Office Phone # Fax #    Acacia Washington Petersen [phone] [address] [facility] [address]        Equal Access to Services     LILLIE CHILDERS : Hadii aad ku hadasho Soomaali, waaxda luqadaha, qaybta kaalmada adeegyada, waxay margarettein haylouien graeme benavides . So [facility] [phone].    ATENCIÓN: Si habla español, tiene a alcaraz disposición servicios gratuitos de asistencia lingüística. Llame al [phone].    We comply with applicable federal civil rights laws and [place] laws. We do not discriminate on the basis of race, color, national origin, age, disability, sex, sexual orientation, or gender identity.            Thank you!     Thank you for choosing [facility] PHYSICAL THERAPY  for your care. Our goal is always to provide you with excellent care. Hearing back from our patients is one way we can continue to improve our services. Please take a few minutes to complete the written survey that you may receive in the mail after your visit with us. Thank you!             Your Updated Medication List - Protect others around you: Learn how to safely use, store and throw away your medicines at www.disposemymeds.org.          This list is accurate as of: 12/7/17  1:30 PM.  Always use your most recent med list.                   Brand Name Dispense Instructions for use Diagnosis    azithromycin 250 MG tablet    ZITHROMAX    6 tablet     Two tabs today.  One tab daily x 4 days.    Chest congestion       fexofenadine-pseudoePHEDrine  MG per 12 hr tablet    ALLEGRA-D    180 tablet    Take 1 tablet by mouth 2 times daily    Seasonal allergic rhinitis, unspecified allergic rhinitis trigger       fluticasone 50 MCG/ACT spray    FLONASE    48 g    Spray 1-2 sprays into both nostrils daily    Seasonal allergic rhinitis, unspecified allergic rhinitis trigger       HYDROcodone-acetaminophen 5-325 MG per tablet    NORCO    30 tablet    Take 1-2 tablets by mouth every 6 hours as needed for moderate to severe pain    Left shoulder pain, unspecified chronicity       ibuprofen 800 MG tablet    ADVIL/MOTRIN    90 tablet    Take 1 tablet (800 mg) by mouth 3 times daily as needed for pain    Radial styloid tenosynovitis of right hand       sildenafil 20 MG tablet    REVATIO    20 tablet    1-3 tabs daily as needed    Erectile dysfunction, unspecified erectile dysfunction type

## 2017-12-07 NOTE — PROGRESS NOTES
Subjective:    HPI                    Objective:    System    Physical Exam    General     ROS    Assessment/Plan:      SUBJECTIVE  Subjective changes as noted by pt:  Overall doing ok--still quite sore--both with movement and when sleeping --he is still waking a        Current pain level: 3/10     Changes in function:  Yes (See Goal flowsheet attached for changes in current functional level)     Adverse reaction to treatment or activity:  None    OBJECTIVE  Changes in objective findings:  Yes, L shoulder AAROM:    Standing wand lxvaqcr=755  ABD standing=145  EXT=55  ER on wall=50        ASSESSMENT  Abdullai continues to require intervention to meet STG and LTG's: PT  Patient's symptoms are resolving.  Patient is progressing as expected.  Response to therapy has shown an improvement in  pain level and ROM   Progress made towards STG/LTG?  Yes (See Goal flowsheet attached for updates on achievement of STG and LTG)    PLAN  Current treatment program is being advanced to more complex exercises.    PTA/ATC plan:  N/A    Please refer to the daily flowsheet for treatment today, total treatment time and time spent performing 1:1 timed codes.

## 2017-12-14 ENCOUNTER — THERAPY VISIT (OUTPATIENT)
Dept: PHYSICAL THERAPY | Facility: CLINIC | Age: 47
End: 2017-12-14
Payer: OTHER MISCELLANEOUS

## 2017-12-14 DIAGNOSIS — M25.512 ACUTE PAIN OF LEFT SHOULDER: ICD-10-CM

## 2017-12-14 DIAGNOSIS — M19.012 GLENOHUMERAL ARTHRITIS, LEFT: ICD-10-CM

## 2017-12-14 DIAGNOSIS — Z47.89 AFTERCARE FOLLOWING SURGERY OF THE MUSCULOSKELETAL SYSTEM: ICD-10-CM

## 2017-12-14 PROCEDURE — 97110 THERAPEUTIC EXERCISES: CPT | Mod: GP | Performed by: PHYSICAL THERAPIST

## 2017-12-14 PROCEDURE — 97140 MANUAL THERAPY 1/> REGIONS: CPT | Mod: GP | Performed by: PHYSICAL THERAPIST

## 2017-12-14 NOTE — PROGRESS NOTES
[place] [facility] Evaluation  Subjective:    HPI                    Objective:    System    Physical Exam    General     ROS    Assessment/Plan:      SUBJECTIVE  Subjective changes as noted by pt:  Pt reports that his sleeping is a little better. Depending on the say his shoulder pain I ok--he tried riding bike at the gym and that was good but fast walking and abdominal workouts bug him if he doesn't hold or stabilize his shoulder.       Current pain level: 2/10     Changes in function:  Yes (See Goal flowsheet attached for changes in current functional level)     Adverse reaction to treatment or activity:  None    OBJECTIVE  Changes in objective findings:  Yes, L shoulder AAROM:    Standing wand jbsbyvg=094  ABD=142  EXT=60  ER on wall=58        ASSESSMENT  Abdullai continues to require intervention to meet STG and LTG's: PT  Patient's symptoms are resolving.  Patient is progressing as expected.  Response to therapy has shown an improvement in  pain level and ROM   Progress made towards STG/LTG?  Yes (See Goal flowsheet attached for updates on achievement of STG and LTG)    PLAN  Current treatment program is being advanced to more complex exercises.    PTA/ATC plan:  N/A    Please refer to the daily flowsheet for treatment today, total treatment time and time spent performing 1:1 timed codes.

## 2017-12-14 NOTE — MR AVS SNAPSHOT
After Visit Summary   12/14/2017    Bj Philip    MRN: [id number]           Patient Information     Date Of Birth          1970        Visit Information        Provider Department      12/14/2017 4:10 PM Oneida Lopez, PT [facility]        Today's Diagnoses     Acute pain of left shoulder        Aftercare following surgery of the musculoskeletal system        Glenohumeral arthritis, left           Follow-ups after your visit        Your next 10 appointments already scheduled     Dec 18, 2017  1:20 PM CST   DEMETRICE Extremity with Oneida Lopez PT   [facility] ([facility])    [address]  [facility] [address]   [phone]            Dec 28, 2017 12:10 PM CST   DEMETRICE Extremity with Oneida Lopez PT   [facility] ([facility])    [address]  [facility] [address]   [phone]            Jan 04, 2018  2:50 PM CST   DEMETRICE Extremity with Oneida Lopez, PT   [facility] ([facility])    [address]  [facility] [address]   [phone]              Who to contact     If you have questions or need follow up information about today's clinic visit or your schedule please contact [facility] TH[facility] directly at [phone].  Normal or non-critical lab and imaging results will be communicated to you by MyChart, letter or phone within 4 business days after the clinic has received the results. If you do not hear from us within 7 days, please contact the clinic through [employer] or phone. If you have a critical or abnormal lab result, we will notify you by phone as soon as possible.  Submit refill requests through [employer] or call your pharmacy and they will forward the refill request to us.  Please allow 3 business days for your refill to be completed.          Additional Information About Your Visit        MyChart Information     [employer] gives you secure access to your electronic health record. If you see a primary care provider, you can also send messages to your care team and make appointments. If you have questions, please call your primary care clinic.  If you do not have a primary care provider, please call [phone] and they will assist you.        Care EveryWhere ID     This is your Care EveryWhere ID. This could be used by other organizations to access your [facility] medical records  [id number]         Blood Pressure from Last 3 Encounters:   12/04/17 108/80   10/18/17 114/76   05/09/17 116/78    Weight from Last 3 Encounters:   12/04/17 103.2 kg (227 lb 8 oz)   10/18/17 102.8 kg (226 lb 9.6 oz)   05/09/17 103.9 kg (229 lb)              We Performed the Following     MANUAL THER TECH,1+REGIONS,EA 15 MIN     THERAPEUTIC EXERCISES        Primary Care Provider Office Phone # Fax #    Acacia Washington Petersen [phone] [address] [facility] [address]        Equal Access to Services     ANDRA CHILDERS : Hadii aad ku hadasho Sovikkiali, waaxda luqadaha, qaybta kaalmada adeegyada, wes alba. So [facility] [phone].    ATENCIÓN: Si habla español, tiene a alcaraz disposición servicios gratuitos de asistencia lingüística. [facility] [phone].    We comply with applicable federal civil rights laws and [place] laws. We do not discriminate on the basis of race, color, national origin, age, disability, sex, sexual orientation, or gender identity.            Thank you!     Thank you for choosing [facility] THERAP[facility]  for your care. Our goal is always to provide you with excellent care. Hearing back from our patients is one way we can continue to improve our services. Please take a few minutes to complete  the written survey that you may receive in the mail after your visit with us. Thank you!             Your Updated Medication List - Protect others around you: Learn how to safely use, store and throw away your medicines at www.disposemymeds.org.          This list is accurate as of: 12/14/17  5:07 PM.  Always use your most recent med list.                   Brand Name Dispense Instructions for use Diagnosis    azithromycin 250 MG tablet    ZITHROMAX    6 tablet    Two tabs today.  One tab daily x 4 days.    Chest congestion       fexofenadine-pseudoePHEDrine  MG per 12 hr tablet    ALLEGRA-D    180 tablet    Take 1 tablet by mouth 2 times daily    Seasonal allergic rhinitis, unspecified allergic rhinitis trigger       fluticasone 50 MCG/ACT spray    FLONASE    48 g    Spray 1-2 sprays into both nostrils daily    Seasonal allergic rhinitis, unspecified allergic rhinitis trigger       HYDROcodone-acetaminophen 5-325 MG per tablet    NORCO    30 tablet    Take 1-2 tablets by mouth every 6 hours as needed for moderate to severe pain    Left shoulder pain, unspecified chronicity       ibuprofen 800 MG tablet    ADVIL/MOTRIN    90 tablet    Take 1 tablet (800 mg) by mouth 3 times daily as needed for pain    Radial styloid tenosynovitis of right hand       sildenafil 20 MG tablet    REVATIO    20 tablet    1-3 tabs daily as needed    Erectile dysfunction, unspecified erectile dysfunction type

## 2017-12-18 ENCOUNTER — THERAPY VISIT (OUTPATIENT)
Dept: PHYSICAL THERAPY | Facility: CLINIC | Age: 47
End: 2017-12-18
Payer: OTHER MISCELLANEOUS

## 2017-12-18 DIAGNOSIS — Z47.89 AFTERCARE FOLLOWING SURGERY OF THE MUSCULOSKELETAL SYSTEM: ICD-10-CM

## 2017-12-18 DIAGNOSIS — M19.012 GLENOHUMERAL ARTHRITIS, LEFT: ICD-10-CM

## 2017-12-18 DIAGNOSIS — M25.512 ACUTE PAIN OF LEFT SHOULDER: ICD-10-CM

## 2017-12-18 PROCEDURE — 97110 THERAPEUTIC EXERCISES: CPT | Mod: GP | Performed by: PHYSICAL THERAPIST

## 2017-12-18 PROCEDURE — 97112 NEUROMUSCULAR REEDUCATION: CPT | Mod: GP | Performed by: PHYSICAL THERAPIST

## 2017-12-18 NOTE — PROGRESS NOTES
"[place] [facility] Evaluation  Subjective:    HPI                    Objective:    System    Physical Exam    General     ROS    Assessment/Plan:      PROGRESS  REPORT    Progress reporting period is from 11-13-17 to 12-18-17.       SUBJECTIVE  Subjective changes noted by patient:  In general pt reports that his pain is lessening and his mobility is improving since surgery. He still struggles with stiffness at times and is sleeping better but it's still not great. He has learned to \"work his way through\" getting dressed and he is still bothered by putting on a t-shirt. He still struggles with pain while trying to use his L hand to drive and use the steering wheel when turning.          Current pain level is 1/0 at rest and 5/10 with activity.     Previous pain level was  7/10  .   Changes in function:  Yes (See Goal flowsheet attached for changes in current functional level)  Adverse reaction to treatment or activity: None    OBJECTIVE  Changes noted in objective findings:  Yes, L shoulder AAROM:    Wand standing nxotxvw=269 (was 148 last week)  Wand supine cicmqgt=902  ABD=130 (141 last week)  EXT=58  ER supine wand=33 (measured 58 last week)  IR/EXT=L3        ASSESSMENT/PLAN  Updated problem list and treatment plan: Diagnosis 1:  S/p L extensive debridement, SAD, DCE    Pain -  hot/cold therapy, manual therapy, self management, education and home program  Decreased ROM/flexibility - manual therapy and therapeutic exercise  Decreased joint mobility - manual therapy and therapeutic exercise  Decreased strength - therapeutic exercise and therapeutic activities  Impaired muscle performance - neuro re-education  Decreased function - therapeutic activities  STG/LTGs have been met or progress has been made towards goals:  Yes (See Goal flow sheet completed today.)  Assessment of Progress: The patient's condition is improving.  The patient's condition has potential to improve.  Patient is meeting short term " goals and is progressing towards long term goals.  Self Management Plans:  Patient has been instructed in a home treatment program.  Patient is independent in a home treatment program.  Patient  has been instructed in self management of symptoms.  Patient is independent in self management of symptoms.  I have re-evaluated this patient and find that the nature, scope, duration and intensity of the therapy is appropriate for the medical condition of the patient.  Bj continues to require the following intervention to meet STG and LTG's:  PT    Recommendations:  This patient would benefit from continued therapy.     Frequency:  1 X week, once daily  Duration:  for 6 weeks          Please refer to the daily flowsheet for treatment today, total treatment time and time spent performing 1:1 timed codes.

## 2017-12-18 NOTE — LETTER
"[facility] THERAP[facility]  [address] [facility]  [place] [id number] [phone]    2017    Re: Bj Philip   :   1970  MRN:  [id number]   REFERRING PHYSICIAN:   Kyle Branham    [facility] PH[facility]ICAL [facility]    Date of Initial Evaluation:   Visits:  Rxs Used: 6  Reason for Referral:     Acute pain of left shoulder  Aftercare following surgery of the musculoskeletal system  Glenohumeral arthritis, left    PROGRESS  REPORT    Progress reporting period is from 17 to 17.       SUBJECTIVE  Subjective changes noted by patient:  In general pt reports that his pain is lessening and his mobility is improving since surgery. He still struggles with stiffness at times and is sleeping better but it's still not great. He has learned to \"work his way through\" getting dressed and he is still bothered by putting on a t-shirt. He still struggles with pain while trying to use his L hand to drive and use the steering wheel when turning.          Current pain level is 1/0 at rest and 5/10 with activity.     Previous pain level was  7/10  .   Changes in function:  Yes (See Goal flowsheet attached for changes in current functional level)  Adverse reaction to treatment or activity: None    OBJECTIVE  Changes noted in objective findings:  Yes, L shoulder AAROM:    Wand standing diwqkfa=749 (was 148 last week)  Wand supine ctfetlf=337  ABD=130 (141 last week)  EXT=58  ER supine wand=33 (measured 58 last week)  IR/EXT=L3        ASSESSMENT/PLAN  Updated problem list and treatment plan: Diagnosis 1:  S/p L extensive debridement, SAD, DCE    Pain -  hot/cold therapy, manual therapy, self management, education and home program  Decreased ROM/flexibility - manual therapy and therapeutic exercise  Decreased joint mobility - manual therapy and therapeutic exercise  Decreased strength - therapeutic exercise and " therapeutic activities  Impaired muscle performance - neuro re-education  Decreased function - therapeutic activities  STG/LTGs have been met or progress has been made towards goals:  Yes (See Goal flow sheet completed today.)  Assessment of Progress: The patient's condition is improving.  The patient's condition has potential to improve.  Patient is meeting short term goals and is progressing towards long term goals.  Self Management Plans:  Patient has been instructed in a home treatment program.  Patient is independent in a home treatment program.  Patient  has been instructed in self management of symptoms.  Patient is independent in self management of symptoms.  I have re-evaluated this patient and find that the nature, scope, duration and intensity of the therapy is appropriate for the medical condition of the patient.  Bj continues to require the following intervention to meet STG and LTG's:  PT    Recommendations:  This patient would benefit from continued therapy.     Frequency:  1 X week, once daily  Duration:  for 6 weeks  Thank you for your referral.    INQUIRIES  Therapist: Oneida Lopez DPT  [facility]  [address] [place] [address]. [address]  [place] [address]  Phone: [phone]  Fax: [phone]

## 2017-12-18 NOTE — MR AVS SNAPSHOT
After Visit Summary   12/18/2017    Bj Philip    MRN: [id number]           Patient Information     Date Of Birth          1970        Visit Information        Provider Department      12/18/2017 1:20 PM Oneida Lopez, PT [facility]        Today's Diagnoses     Acute pain of left shoulder        Aftercare following surgery of the musculoskeletal system        Glenohumeral arthritis, left           Follow-ups after your visit        Your next 10 appointments already scheduled     Dec 28, 2017 12:10 PM CST   DEMETRICE Extremity with Oneida Lopez PT   [facility] ([facility])    [address]  [facility] [address]   [phone]            Jan 04, 2018  2:50 PM CST   DEMETRICE Extremity with Oneida Lopez PT   [facility] ([facility])    [address]  [facility] [id number] [phone]              Who to contact     If you have questions or need follow up information about today's clinic visit or your schedule please contact [facility] PHYSICAL THERAPY directly at [phone].  Normal or non-critical lab and imaging results will be communicated to you by MyChart, letter or phone within 4 business days after the clinic has received the results. If you do not hear from us within 7 days, please contact the clinic through [employer] or phone. If you have a critical or abnormal lab result, we will notify you by phone as soon as possible.  Submit refill requests through [employer] or call your pharmacy and they will forward the refill request to us. Please allow 3 business days for your refill to be completed.          Additional Information About Your Visit        [employer] Information     [employer] gives you secure access to your electronic health record. If you see a primary care  provider, you can also send messages to your care team and make appointments. If you have questions, please call your primary care clinic.  If you do not have a primary care provider, please call [phone] and they will assist you.        Care EveryWhere ID     This is your Care EveryWhere ID. This could be used by other organizations to access your [facility] medical records  [id number]         Blood Pressure from Last 3 Encounters:   12/04/17 108/80   10/18/17 114/76   05/09/17 116/78    Weight from Last 3 Encounters:   12/04/17 103.2 kg (227 lb 8 oz)   10/18/17 102.8 kg (226 lb 9.6 oz)   05/09/17 103.9 kg (229 lb)              We Performed the Following     DEMETRICE PROGRESS NOTES REPORT     NEUROMUSCULAR RE-EDUCATION     THERAPEUTIC EXERCISES        Primary Care Provider Office Phone # Fax #    Acacia Washington Petersen [phone] [address] [facility] [address]        Equal Access to Services     ANDRA [facility] : Hadii aad ku hadasho Soomaali, waaxda luqadaha, qaybta kaalmada adeegyada, waxay idiin hayaan adeeg kharashannan la'jessy . So [facility] [phone].    ATENCIÓN: Si habla español, tiene a alcaraz disposición servicios gratuitos de asistencia lingüística. [facility] [phone].    We comply with applicable federal civil rights laws and [place] laws. We do not discriminate on the basis of race, color, national origin, age, disability, sex, sexual orientation, or gender identity.            Thank you!     Thank you for choosing [facility] THERAP[facility]  for your care. Our goal is always to provide you with excellent care. Hearing back from our patients is one way we can continue to improve our services. Please take a few minutes to complete the written survey that you may receive in the mail after your visit with us. Thank you!             Your Updated Medication List - Protect others around you: Learn how to safely use, store and throw away your  medicines at www.disposemymeds.org.          This list is accurate as of: 12/18/17  2:08 PM.  Always use your most recent med list.                   Brand Name Dispense Instructions for use Diagnosis    azithromycin 250 MG tablet    ZITHROMAX    6 tablet    Two tabs today.  One tab daily x 4 days.    Chest congestion       fexofenadine-pseudoePHEDrine  MG per 12 hr tablet    ALLEGRA-D    180 tablet    Take 1 tablet by mouth 2 times daily    Seasonal allergic rhinitis, unspecified allergic rhinitis trigger       fluticasone 50 MCG/ACT spray    FLONASE    48 g    Spray 1-2 sprays into both nostrils daily    Seasonal allergic rhinitis, unspecified allergic rhinitis trigger       HYDROcodone-acetaminophen 5-325 MG per tablet    NORCO    30 tablet    Take 1-2 tablets by mouth every 6 hours as needed for moderate to severe pain    Left shoulder pain, unspecified chronicity       ibuprofen 800 MG tablet    ADVIL/MOTRIN    90 tablet    Take 1 tablet (800 mg) by mouth 3 times daily as needed for pain    Radial styloid tenosynovitis of right hand       sildenafil 20 MG tablet    REVATIO    20 tablet    1-3 tabs daily as needed    Erectile dysfunction, unspecified erectile dysfunction type

## 2017-12-28 ENCOUNTER — THERAPY VISIT (OUTPATIENT)
Dept: PHYSICAL THERAPY | Facility: CLINIC | Age: 47
End: 2017-12-28
Payer: OTHER MISCELLANEOUS

## 2017-12-28 DIAGNOSIS — M19.012 GLENOHUMERAL ARTHRITIS, LEFT: ICD-10-CM

## 2017-12-28 DIAGNOSIS — Z47.89 AFTERCARE FOLLOWING SURGERY OF THE MUSCULOSKELETAL SYSTEM: ICD-10-CM

## 2017-12-28 DIAGNOSIS — M25.512 ACUTE PAIN OF LEFT SHOULDER: ICD-10-CM

## 2017-12-28 PROCEDURE — 97140 MANUAL THERAPY 1/> REGIONS: CPT | Mod: GP | Performed by: PHYSICAL THERAPIST

## 2017-12-28 PROCEDURE — 97110 THERAPEUTIC EXERCISES: CPT | Mod: GP | Performed by: PHYSICAL THERAPIST

## 2017-12-28 NOTE — MR AVS SNAPSHOT
After Visit Summary   12/28/2017    Bj Philip    MRN: [id number]           Patient Information     Date Of Birth          1970        Visit Information        Provider Department      12/28/2017 12:10 PM Oneida Lopez, SHANDRA [facility] Physical Therapy        Today's Diagnoses     Acute pain of left shoulder        Aftercare following surgery of the musculoskeletal system        Glenohumeral arthritis, left           Follow-ups after your visit        Your next 10 appointments already scheduled     Jan 04, 2018  2:50 PM CST   DEMETRICE Extremity with Oneida Lopez PT   [facility] Physical Therapy ([facility])    [address]  [facility] [address]   [phone]            Jan 11, 2018 12:10 PM CST   DEMETRICE Extremity with Oneida Lopez PT   [facility] Therapy ([facility])    [address]  [facility] [address]   [phone]            Jan 18, 2018  2:50 PM CST   DEMETRICE Extremity with Oneida Lopez PT   [facility] Physical Therapy ([facility])    [address]  [facility] [address]   [phone]            Jan 24, 2018  4:30 PM CST   DEMETRICE Extremity with Oneida Lopez PT   [facility] Therapy ([facility])    [address]  [facility] [address]   [phone]            Jan 31, 2018 11:20 AM CST   DEMETRICE Extremity with Oneida Lopez PT   [facility] Therapy ([facility])    [address]  [facility] [address]   [phone]              Who to contact     If you have questions or need follow up information about today's clinic visit or your schedule please contact [facility] PHYSICAL THERAPY directly at [phone].  Normal or  non-critical lab and imaging results will be communicated to you by MyChart, letter or phone within 4 business days after the clinic has received the results. If you do not hear from us within 7 days, please contact the clinic through Plaid inct or phone. If you have a critical or abnormal lab result, we will notify you by phone as soon as possible.  Submit refill requests through Friendshippr or call your pharmacy and they will forward the refill request to us. Please allow 3 business days for your refill to be completed.          Additional Information About Your Visit        Friendshippr Information     Friendshippr gives you secure access to your electronic health record. If you see a primary care provider, you can also send messages to your care team and make appointments. If you have questions, please call your primary care clinic.  If you do not have a primary care provider, please call [phone] and they will assist you.        Care EveryWhere ID     This is your Care EveryWhere ID. This could be used by other organizations to access your [place] medical records  [id number]         Blood Pressure from Last 3 Encounters:   12/04/17 108/80   10/18/17 114/76   05/09/17 116/78    Weight from Last 3 Encounters:   12/04/17 103.2 kg (227 lb 8 oz)   10/18/17 102.8 kg (226 lb 9.6 oz)   05/09/17 103.9 kg (229 lb)              We Performed the Following     MANUAL THER TECH,1+REGIONS,EA 15 MIN     THERAPEUTIC EXERCISES        Primary Care Provider Office Phone # Fax #    [place] [phone] [address] [facility] [address]        Equal Access to Services     [facility]: Hadii aad ku hadasho Soomaali, waaxda luqadaha, qaybta kaalmada jere, wes benavides . So [facility] [phone].    ATENCIÓN: Si habla español, tiene a alcaraz disposición servicios gratuitos de asistencia lingüística. Llame al [phone].    We comply with applicable federal civil rights  laws and [place] laws. We do not discriminate on the basis of race, color, national origin, age, disability, sex, sexual orientation, or gender identity.            Thank you!     Thank you for choosing [facility] PHYSICAL [facility]  for your care. Our goal is always to provide you with excellent care. Hearing back from our patients is one way we can continue to improve our services. Please take a few minutes to complete the written survey that you may receive in the mail after your visit with us. Thank you!             Your Updated Medication List - Protect others around you: Learn how to safely use, store and throw away your medicines at www.disposemymeds.org.          This list is accurate as of: 12/28/17 12:57 PM.  Always use your most recent med list.                   Brand Name Dispense Instructions for use Diagnosis    azithromycin 250 MG tablet    ZITHROMAX    6 tablet    Two tabs today.  One tab daily x 4 days.    Chest congestion       fexofenadine-pseudoePHEDrine  MG per 12 hr tablet    ALLEGRA-D    180 tablet    Take 1 tablet by mouth 2 times daily    Seasonal allergic rhinitis, unspecified allergic rhinitis trigger       fluticasone 50 MCG/ACT spray    FLONASE    48 g    Spray 1-2 sprays into both nostrils daily    Seasonal allergic rhinitis, unspecified allergic rhinitis trigger       HYDROcodone-acetaminophen 5-325 MG per tablet    NORCO    30 tablet    Take 1-2 tablets by mouth every 6 hours as needed for moderate to severe pain    Left shoulder pain, unspecified chronicity       ibuprofen 800 MG tablet    ADVIL/MOTRIN    90 tablet    Take 1 tablet (800 mg) by mouth 3 times daily as needed for pain    Radial styloid tenosynovitis of right hand       sildenafil 20 MG tablet    REVATIO    20 tablet    1-3 tabs daily as needed    Erectile dysfunction, unspecified erectile dysfunction type

## 2017-12-28 NOTE — PROGRESS NOTES
"Subjective:  HPI                    Objective:  System    Physical Exam    General     ROS    Assessment/Plan:    SUBJECTIVE  Subjective changes as noted by pt:  Pt reports he saw Debra and she said that he was \"coming along\". She gave him some anxiety pills for sleeping and it has really helped. He feels like pain isn't changing a whole lot.        Current pain level: 2/10     Changes in function:  Yes (See Goal flowsheet attached for changes in current functional level)     Adverse reaction to treatment or activity:  None    OBJECTIVE  Changes in objective findings:  Yes, L shoulder AAROM:    Wand ABD=147  Wand standing khjlafg=658  Supine wand nphypod=432  EXT=63  IR/EXT=L1  ER supine=45    ASSESSMENT  Abdullai continues to require intervention to meet STG and LTG's: PT  Patient's symptoms are resolving.  Patient is progressing as expected.  Response to therapy has shown an improvement in  pain level, ROM  and flexibility  Progress made towards STG/LTG?  Yes (See Goal flowsheet attached for updates on achievement of STG and LTG)    PLAN  Current treatment program is being advanced to more complex exercises.    PTA/ATC plan:  N/A    Please refer to the daily flowsheet for treatment today, total treatment time and time spent performing 1:1 timed codes.          "

## 2018-01-04 ENCOUNTER — THERAPY VISIT (OUTPATIENT)
Dept: PHYSICAL THERAPY | Facility: CLINIC | Age: 48
End: 2018-01-04
Payer: OTHER MISCELLANEOUS

## 2018-01-04 DIAGNOSIS — Z47.89 AFTERCARE FOLLOWING SURGERY OF THE MUSCULOSKELETAL SYSTEM: ICD-10-CM

## 2018-01-04 DIAGNOSIS — M19.012 GLENOHUMERAL ARTHRITIS, LEFT: ICD-10-CM

## 2018-01-04 DIAGNOSIS — M25.512 ACUTE PAIN OF LEFT SHOULDER: ICD-10-CM

## 2018-01-04 PROCEDURE — 97140 MANUAL THERAPY 1/> REGIONS: CPT | Mod: GP | Performed by: PHYSICAL THERAPIST

## 2018-01-04 PROCEDURE — 97110 THERAPEUTIC EXERCISES: CPT | Mod: GP | Performed by: PHYSICAL THERAPIST

## 2018-01-04 NOTE — PROGRESS NOTES
"Subjective:  HPI                    Objective:  System    Physical Exam    General     ROS    Assessment/Plan:    SUBJECTIVE  Subjective changes as noted by pt:  He still struggles to get in the right position to sleep at night. He feels like the motion is improving and and he doesn't feel as \"stiff and tight\" anymore. He isn't waking with shoulder pain any longer but does have some pain when he moves his shoulder.        Current pain level: 0/10 at rest and may get to a 6-7/10 when he moves it     Changes in function:  Yes (See Goal flowsheet attached for changes in current functional level)     Adverse reaction to treatment or activity:  None    OBJECTIVE  Changes in objective findings:  Yes, R shoulder AAROM:     Wand abd=150  Wand standing smptvle=472  Wall cnzswfi=951    Neck sx improved after repeated retraction with extension    Hypertonicity noted along UT/LS and rhomboids today.          ASSESSMENT  Abdullai continues to require intervention to meet STG and LTG's: PT  Patient's symptoms are resolving.  Patient is progressing as expected.  Response to therapy has shown an improvement in  pain level, ROM , flexibility and strength  Progress made towards STG/LTG?  Yes (See Goal flowsheet attached for updates on achievement of STG and LTG)    PLAN  Current treatment program is being advanced to more complex exercises.    PTA/ATC plan:  N/A    Please refer to the daily flowsheet for treatment today, total treatment time and time spent performing 1:1 timed codes.          "

## 2018-01-04 NOTE — MR AVS SNAPSHOT
After Visit Summary   1/4/2018    Bj Philip    MRN: [id number]           Patient Information     Date Of Birth          1970        Visit Information        Provider Department      1/4/2018 2:50 PM Oneida Lopez, PT [facility]        Today's Diagnoses     Acute pain of left shoulder        Aftercare following surgery of the musculoskeletal system        Glenohumeral arthritis, left           Follow-ups after your visit        Your next 10 appointments already scheduled     Jan 11, 2018 12:10 PM CST   DEMETRICE Extremity with Oneida Lopez PT   [facility] ([facility])    [address]  [facility] [address]   [phone]            Jan 18, 2018  2:50 PM CST   DEMETRICE Extremity with Oneida Lopez PT   [facility] ([facility])    [address]  [facility] [address]   [phone]            Jan 24, 2018  4:30 PM CST   DEMETRICE Extremity with Oneida Lopez PT   [facility] Physical Therapy ([facility])    [address]  [facility] [address]   [phone]            Jan 31, 2018 11:20 AM CST   DEMETRICE Extremity with Oneida Lopez PT   [facility] Physical Therapy ([facility])    [address]  [facility] [address]   [phone]              Who to contact     If you have questions or need follow up information about today's clinic visit or your schedule please contact [facility] PHYSICAL THERAPY directly at [phone].  Normal or non-critical lab and imaging results will be communicated to you by MyChart, letter or phone within 4 business days after the clinic has received the results. If you do not hear from us within 7 days, please contact the clinic through  Alumnizehart or phone. If you have a critical or abnormal lab result, we will notify you by phone as soon as possible.  Submit refill requests through ShareTracker or call your pharmacy and they will forward the refill request to us. Please allow 3 business days for your refill to be completed.          Additional Information About Your Visit        Alumnizehart Information     ShareTracker gives you secure access to your electronic health record. If you see a primary care provider, you can also send messages to your care team and make appointments. If you have questions, please call your primary care clinic.  If you do not have a primary care provider, please call [phone] and they will assist you.        Care EveryWhere ID     This is your Care EveryWhere ID. This could be used by other organizations to access your [place] medical records  [id number]         Blood Pressure from Last 3 Encounters:   12/04/17 108/80   10/18/17 114/76   05/09/17 116/78    Weight from Last 3 Encounters:   12/04/17 103.2 kg (227 lb 8 oz)   10/18/17 102.8 kg (226 lb 9.6 oz)   05/09/17 103.9 kg (229 lb)              We Performed the Following     MANUAL THER TECH,1+REGIONS,EA 15 MIN     THERAPEUTIC EXERCISES        Primary Care Provider Office Phone # Fax #    [place] [place] [phone] [address] [facility] [address]        Equal Access to Services     [facility]: Hadii aad ku hadasho Sovikkiali, waaxda luqadaha, qaybta kaalmada jere, wes benavides . So [facility] [phone].    ATENCIÓN: Si habla español, tiene a alcaraz disposición servicios gratuitos de asistencia lingüística. Aashish al [phone].    We comply with applicable federal civil rights laws and [place] laws. We do not discriminate on the basis of race, color, national origin, age, disability, sex, sexual orientation, or gender identity.            Thank you!     [facility] you for choosing [facility]  - [facility] PHYSICAL THERAPY  for your care. Our goal is always to provide you with excellent care. Hearing back from our patients is one way we can continue to improve our services. Please take a few minutes to complete the written survey that you may receive in the mail after your visit with us. Thank you!             Your Updated Medication List - Protect others around you: Learn how to safely use, store and throw away your medicines at www.disposemymeds.org.          This list is accurate as of: 1/4/18  4:54 PM.  Always use your most recent med list.                   Brand Name Dispense Instructions for use Diagnosis    azithromycin 250 MG tablet    ZITHROMAX    6 tablet    Two tabs today.  One tab daily x 4 days.    Chest congestion       fexofenadine-pseudoePHEDrine  MG per 12 hr tablet    ALLEGRA-D    180 tablet    Take 1 tablet by mouth 2 times daily    Seasonal allergic rhinitis, unspecified allergic rhinitis trigger       fluticasone 50 MCG/ACT spray    FLONASE    48 g    Spray 1-2 sprays into both nostrils daily    Seasonal allergic rhinitis, unspecified allergic rhinitis trigger       HYDROcodone-acetaminophen 5-325 MG per tablet    NORCO    30 tablet    Take 1-2 tablets by mouth every 6 hours as needed for moderate to severe pain    Left shoulder pain, unspecified chronicity       ibuprofen 800 MG tablet    ADVIL/MOTRIN    90 tablet    Take 1 tablet (800 mg) by mouth 3 times daily as needed for pain    Radial styloid tenosynovitis of right hand       sildenafil 20 MG tablet    REVATIO    20 tablet    1-3 tabs daily as needed    Erectile dysfunction, unspecified erectile dysfunction type

## 2018-01-11 ENCOUNTER — THERAPY VISIT (OUTPATIENT)
Dept: PHYSICAL THERAPY | Facility: CLINIC | Age: 48
End: 2018-01-11
Payer: OTHER MISCELLANEOUS

## 2018-01-11 DIAGNOSIS — Z47.89 AFTERCARE FOLLOWING SURGERY OF THE MUSCULOSKELETAL SYSTEM: ICD-10-CM

## 2018-01-11 DIAGNOSIS — M25.512 ACUTE PAIN OF LEFT SHOULDER: ICD-10-CM

## 2018-01-11 DIAGNOSIS — M19.012 GLENOHUMERAL ARTHRITIS, LEFT: ICD-10-CM

## 2018-01-11 PROCEDURE — 97110 THERAPEUTIC EXERCISES: CPT | Mod: GP | Performed by: PHYSICAL THERAPIST

## 2018-01-11 PROCEDURE — 97140 MANUAL THERAPY 1/> REGIONS: CPT | Mod: GP | Performed by: PHYSICAL THERAPIST

## 2018-01-11 NOTE — PROGRESS NOTES
Subjective:  HPI                    Objective:  System    Physical Exam    General     ROS    Assessment/Plan:    SUBJECTIVE  Subjective changes as noted by pt:  Pt reports he did the cervical retraction and extension and he does notice a change and his neck is feeling better. He feels as though his pain is reduced and he's been getting some better sleep.        Current pain level: 1/10   On the top of the shoulder and slightly posterior  Changes in function:  Yes (See Goal flowsheet attached for changes in current functional level)     Adverse reaction to treatment or activity:  None    OBJECTIVE  Changes in objective findings:  Yes, L shoulder AAROM:    Wand ABD=161  EXT=71  Wand standing zhynbbo=870  ER wand supine=65        ASSESSMENT  Abdullai continues to require intervention to meet STG and LTG's: PT  Patient's symptoms are resolving.  Patient is progressing as expected.  Response to therapy has shown an improvement in  pain level, ROM  and flexibility  Progress made towards STG/LTG?  Yes (See Goal flowsheet attached for updates on achievement of STG and LTG)    PLAN  Current treatment program is being advanced to more complex exercises.    PTA/ATC plan:  N/A    Please refer to the daily flowsheet for treatment today, total treatment time and time spent performing 1:1 timed codes.

## 2018-01-11 NOTE — MR AVS SNAPSHOT
After Visit Summary   1/11/2018    Bj Philip    MRN: [id number]           Patient Information     Date Of Birth          1970        Visit Information        Provider Department      1/11/2018 12:10 PM Oneida Lopez, PT [facility]        Today's Diagnoses     Acute pain of left shoulder        Aftercare following surgery of the musculoskeletal system        Glenohumeral arthritis, left           Follow-ups after your visit        Your next 10 appointments already scheduled     Jan 18, 2018  2:50 PM CST   DEMETRICE Extremity with Oneida Lopez PT   [facility] ([facility])    [address]  [facility] [address]   [phone]            Jan 24, 2018  4:30 PM CST   DEMETRICE Extremity with Oneida Lopez PT   [facility] ([facility])    [address]  [facility] [address]   [phone]            Jan 31, 2018 11:20 AM CST   DEMETRICE Extremity with Oneida Lopez, PT   [facility] ([facility])    [address]  [facility] [address]   [phone]              Who to contact     If you have questions or need follow up information about today's clinic visit or your schedule please contact [facility] directly at [phone].  Normal or non-critical lab and imaging results will be communicated to you by MyChart, letter or phone within 4 business days after the clinic has received the results. If you do not hear from us within 7 days, please contact the clinic through Adjudicahart or phone. If you have a critical or abnormal lab result, we will notify you by phone as soon as possible.  Submit refill requests through PlayyOn or call your pharmacy and they will forward the refill request to us.  Please allow 3 business days for your refill to be completed.          Additional Information About Your Visit        MyChart Information     Big Switch Networkshart gives you secure access to your electronic health record. If you see a primary care provider, you can also send messages to your care team and make appointments. If you have questions, please call your primary care clinic.  If you do not have a primary care provider, please call [phone] and they will assist you.        Care EveryWhere ID     This is your Care EveryWhere ID. This could be used by other organizations to access your [place] medical records  [id number]         Blood Pressure from Last 3 Encounters:   12/04/17 108/80   10/18/17 114/76   05/09/17 116/78    Weight from Last 3 Encounters:   12/04/17 103.2 kg (227 lb 8 oz)   10/18/17 102.8 kg (226 lb 9.6 oz)   05/09/17 103.9 kg (229 lb)              We Performed the Following     MANUAL THER TECH,1+REGIONS,EA 15 MIN     THERAPEUTIC EXERCISES        Primary Care Provider Office Phone # Fax #    [place] [place] [phone] [address] [facility] [address]        Equal Access to Services     [facility] : Hadii aad ku hadasho Sovikkiali, waaxda luqadaha, qaybta kaalmada adeegyada, wes alba. So [facility] [phone].    ATENCIÓN: Si habla español, tiene a alcaraz disposición servicios gratuitos de asistencia lingüística. [facility] [phone].    We comply with applicable federal civil rights laws and [place] laws. We do not discriminate on the basis of race, color, national origin, age, disability, sex, sexual orientation, or gender identity.            Thank you!     Thank you for choosing [facility] PHYSICAL THERAPY  for your care. Our goal is always to provide you with excellent care. Hearing back from our patients is one way we can continue to improve our services. Please take a few minutes to complete  the written survey that you may receive in the mail after your visit with us. Thank you!             Your Updated Medication List - Protect others around you: Learn how to safely use, store and throw away your medicines at www.disposemymeds.org.          This list is accurate as of: 1/11/18 12:56 PM.  Always use your most recent med list.                   Brand Name Dispense Instructions for use Diagnosis    azithromycin 250 MG tablet    ZITHROMAX    6 tablet    Two tabs today.  One tab daily x 4 days.    Chest congestion       fexofenadine-pseudoePHEDrine  MG per 12 hr tablet    ALLEGRA-D    180 tablet    Take 1 tablet by mouth 2 times daily    Seasonal allergic rhinitis, unspecified allergic rhinitis trigger       fluticasone 50 MCG/ACT spray    FLONASE    48 g    Spray 1-2 sprays into both nostrils daily    Seasonal allergic rhinitis, unspecified allergic rhinitis trigger       HYDROcodone-acetaminophen 5-325 MG per tablet    NORCO    30 tablet    Take 1-2 tablets by mouth every 6 hours as needed for moderate to severe pain    Left shoulder pain, unspecified chronicity       ibuprofen 800 MG tablet    ADVIL/MOTRIN    90 tablet    Take 1 tablet (800 mg) by mouth 3 times daily as needed for pain    Radial styloid tenosynovitis of right hand       sildenafil 20 MG tablet    REVATIO    20 tablet    1-3 tabs daily as needed    Erectile dysfunction, unspecified erectile dysfunction type

## 2018-01-18 ENCOUNTER — THERAPY VISIT (OUTPATIENT)
Dept: PHYSICAL THERAPY | Facility: CLINIC | Age: 48
End: 2018-01-18
Payer: OTHER MISCELLANEOUS

## 2018-01-18 DIAGNOSIS — M25.512 ACUTE PAIN OF LEFT SHOULDER: ICD-10-CM

## 2018-01-18 DIAGNOSIS — Z47.89 AFTERCARE FOLLOWING SURGERY OF THE MUSCULOSKELETAL SYSTEM: ICD-10-CM

## 2018-01-18 DIAGNOSIS — M19.012 GLENOHUMERAL ARTHRITIS, LEFT: ICD-10-CM

## 2018-01-18 PROCEDURE — 97110 THERAPEUTIC EXERCISES: CPT | Mod: GP | Performed by: PHYSICAL THERAPIST

## 2018-01-18 PROCEDURE — 97112 NEUROMUSCULAR REEDUCATION: CPT | Mod: GP | Performed by: PHYSICAL THERAPIST

## 2018-01-18 NOTE — PROGRESS NOTES
Subjective:  HPI                    Objective:  System    Physical Exam    General     ROS    Assessment/Plan:      PROGRESS  REPORT    Progress reporting period is from 11-13-17 to 1-18-18.       SUBJECTIVE  Subjective changes noted by patient:  Overall pt reports he is about 60-70% better. He reports he is sleeping better and ADLS at or below his waist and in front of him are better. However, he is still struggling with strength for overhead reaching and up behind his back.       Current pain level is 1/10  .     Previous pain level was  7/10  .   Changes in function:  Yes (See Goal flowsheet attached for changes in current functional level)  Adverse reaction to treatment or activity: None    OBJECTIVE  Changes noted in objective findings:  Yes, L shoulder AROM: 125/116/L4/50    L shoulder AAROM:  Wand flexion standing=150  ABD gmfz=980  EXT=68  IR/EXT=T10  ER wand=68        ASSESSMENT/PLAN  Updated problem list and treatment plan: Diagnosis 1:      Pain -  hot/cold therapy, self management, education, directional preference exercise and home program  Decreased ROM/flexibility - manual therapy and therapeutic exercise  Decreased joint mobility - manual therapy and therapeutic exercise  Decreased strength - therapeutic exercise and therapeutic activities  Impaired muscle performance - neuro re-education  Decreased function - therapeutic activities  STG/LTGs have been met or progress has been made towards goals:  Yes (See Goal flow sheet completed today.)  Assessment of Progress: The patient's condition is improving.  The patient's condition has potential to improve.  Patient is meeting short term goals and is progressing towards long term goals.  Self Management Plans:  Patient has been instructed in a home treatment program.  Patient is independent in a home treatment program.  Patient  has been instructed in self management of symptoms.  Patient is independent in self management of symptoms.  I have re-evaluated  this patient and find that the nature, scope, duration and intensity of the therapy is appropriate for the medical condition of the patient.  Bj continues to require the following intervention to meet STG and LTG's:  PT    Recommendations:  This patient would benefit from continued therapy.     Frequency:  1 X week, once daily  Duration:  for 4 weeks then 2x/month for 1 month to continue to address end range motion, strength and scapular stability.    Progress note was sent with patient today.          Please refer to the daily flowsheet for treatment today, total treatment time and time spent performing 1:1 timed codes.

## 2018-01-18 NOTE — LETTER
[facility] PHYSICAL THERAPY  [address] [facility] [address]  [place] [id number] [phone]    2018    Re: Bj Philip   :   1970  MRN:  [id number]   REFERRING PHYSICIAN:   Kyle Branham    [facility]    Date of Initial Evaluation: 17  Visits:     Reason for Referral:  Data Unavailable    EVALUATION SUMMARY      PROGRESS  REPORT    Progress reporting period is from 17 to 18.       SUBJECTIVE  Subjective changes noted by patient:  Overall pt reports he is about 60-70% better. He reports he is sleeping better and ADLS at or below his waist and in front of him are better. However, he is still struggling with strength for overhead reaching and up behind his back.       Current pain level is 1/10  .     Previous pain level was  7/10  .   Changes in function:  Yes (See Goal flowsheet attached for changes in current functional level)  Adverse reaction to treatment or activity: None    OBJECTIVE  Changes noted in objective findings:  Yes, L shoulder AROM: 125/116/L4/50    L shoulder AAROM:  Wand flexion standing=150  ABD gryr=359  EXT=68  IR/EXT=T10  ER wand=68        ASSESSMENT/PLAN  Updated problem list and treatment plan: Diagnosis 1:      Pain -  hot/cold therapy, self management, education, directional preference exercise and home program  Decreased ROM/flexibility - manual therapy and therapeutic exercise  Decreased joint mobility - manual therapy and therapeutic exercise  Decreased strength - therapeutic exercise and therapeutic activities  Impaired muscle performance - neuro re-education  Decreased function - therapeutic activities  STG/LTGs have been met or progress has been made towards goals:  Yes (See Goal flow sheet completed today.)  Assessment of Progress: The patient's condition is improving.  The patient's condition has potential to improve.  Patient is meeting short  term goals and is progressing towards long term goals.  Self Management Plans:  Patient has been instructed in a home treatment program.  Patient is independent in a home treatment program.  Patient  has been instructed in self management of symptoms.  Patient is independent in self management of symptoms.  I have re-evaluated this patient and find that the nature, scope, duration and intensity of the therapy is appropriate for the medical condition of the patient.  Bj continues to require the following intervention to meet STG and LTG's:  PT    Recommendations:  This patient would benefit from continued therapy.     Frequency:  1 X week, once daily  Duration:  for 4 weeks then 2x/month for 1 month to continue to address end range motion, strength and scapular stability.    Progress note was sent with patient today.          Please refer to the daily flowsheet for treatment today, total treatment time and time spent performing 1:1 timed codes.        Thank you for your referral.    INQUIRIES  Therapist: Oneida Black DPT, [facility]  [facility] THER[facility]  [address] [place] [address]. [address]  [facility] [address]  Phone: [phone]  Fax: [phone]

## 2018-01-18 NOTE — MR AVS SNAPSHOT
After Visit Summary   1/18/2018    Bj Philip    MRN: [id number]           Patient Information     Date Of Birth          1970        Visit Information        Provider Department      1/18/2018 2:50 PM Oneida Lopez, PT [facility]        Today's Diagnoses     Acute pain of left shoulder        Aftercare following surgery of the musculoskeletal system        Glenohumeral arthritis, left           Follow-ups after your visit        Your next 10 appointments already scheduled     Jan 24, 2018  4:30 PM CST   DEMETRICE Extremity with Oneida Lopez PT   [facility] ([facility])    [address]  [facility] [address]   [phone]            Jan 31, 2018 11:20 AM CST   DEMETRICE Extremity with Oneida Lopez PT   [facility] ([facility])    [address]  [facility] [address] [phone]              Who to contact     If you have questions or need follow up information about today's clinic visit or your schedule please contact [facility] PHYSICAL THERAPY directly at [phone].  Normal or non-critical lab and imaging results will be communicated to you by MyChart, letter or phone within 4 business days after the clinic has received the results. If you do not hear from us within 7 days, please contact the clinic through [employer] or phone. If you have a critical or abnormal lab result, we will notify you by phone as soon as possible.  Submit refill requests through [employer] or call your pharmacy and they will forward the refill request to us. Please allow 3 business days for your refill to be completed.          Additional Information About Your Visit        [email or web address] Information     [employer] gives you secure access to your electronic health record. If you see a primary care  provider, you can also send messages to your care team and make appointments. If you have questions, please call your primary care clinic.  If you do not have a primary care provider, please call [phone] and they will assist you.        Care EveryWhere ID     This is your Care EveryWhere ID. This could be used by other organizations to access your [place] medical records  [id number]         Blood Pressure from Last 3 Encounters:   12/04/17 108/80   10/18/17 114/76   05/09/17 116/78    Weight from Last 3 Encounters:   12/04/17 103.2 kg (227 lb 8 oz)   10/18/17 102.8 kg (226 lb 9.6 oz)   05/09/17 103.9 kg (229 lb)              We Performed the Following     DEMETRICE PROGRESS NOTES REPORT     NEUROMUSCULAR RE-EDUCATION     THERAPEUTIC EXERCISES        Primary Care Provider Office Phone # Fax #    [facility] [place] [phone] [address] [facility] [address]        Equal Access to Services     ANDRA [facility] : Hadii aad ku hadasho Soomaali, waaxda luqadaha, qaybta kaalmada adeegyada, waxay idiin hayaan adeeg kharashannan la'jessy . So [facility] [phone].    ATENCIÓN: Si habla español, tiene a alcaraz disposición servicios gratuitos de asistencia lingüística. [facility] [phone].    We comply with applicable federal civil rights laws and [place] laws. We do not discriminate on the basis of race, color, national origin, age, disability, sex, sexual orientation, or gender identity.            Thank you!     Thank you for choosing [facility]ER[facility]  for your care. Our goal is always to provide you with excellent care. Hearing back from our patients is one way we can continue to improve our services. Please take a few minutes to complete the written survey that you may receive in the mail after your visit with us. Thank you!             Your Updated Medication List - Protect others around you: Learn how to safely use, store and throw away your  medicines at www.disposemymeds.org.          This list is accurate as of: 1/18/18  4:08 PM.  Always use your most recent med list.                   Brand Name Dispense Instructions for use Diagnosis    azithromycin 250 MG tablet    ZITHROMAX    6 tablet    Two tabs today.  One tab daily x 4 days.    Chest congestion       fexofenadine-pseudoePHEDrine  MG per 12 hr tablet    ALLEGRA-D    180 tablet    Take 1 tablet by mouth 2 times daily    Seasonal allergic rhinitis, unspecified allergic rhinitis trigger       fluticasone 50 MCG/ACT spray    FLONASE    48 g    Spray 1-2 sprays into both nostrils daily    Seasonal allergic rhinitis, unspecified allergic rhinitis trigger       HYDROcodone-acetaminophen 5-325 MG per tablet    NORCO    30 tablet    Take 1-2 tablets by mouth every 6 hours as needed for moderate to severe pain    Left shoulder pain, unspecified chronicity       ibuprofen 800 MG tablet    ADVIL/MOTRIN    90 tablet    Take 1 tablet (800 mg) by mouth 3 times daily as needed for pain    Radial styloid tenosynovitis of right hand       sildenafil 20 MG tablet    REVATIO    20 tablet    1-3 tabs daily as needed    Erectile dysfunction, unspecified erectile dysfunction type

## 2018-01-31 ENCOUNTER — THERAPY VISIT (OUTPATIENT)
Dept: PHYSICAL THERAPY | Facility: CLINIC | Age: 48
End: 2018-01-31
Payer: OTHER MISCELLANEOUS

## 2018-01-31 DIAGNOSIS — Z47.89 AFTERCARE FOLLOWING SURGERY OF THE MUSCULOSKELETAL SYSTEM: ICD-10-CM

## 2018-01-31 DIAGNOSIS — M19.012 GLENOHUMERAL ARTHRITIS, LEFT: ICD-10-CM

## 2018-01-31 DIAGNOSIS — M25.512 ACUTE PAIN OF LEFT SHOULDER: ICD-10-CM

## 2018-01-31 PROCEDURE — 97112 NEUROMUSCULAR REEDUCATION: CPT | Mod: GP | Performed by: PHYSICAL THERAPIST

## 2018-01-31 PROCEDURE — 97110 THERAPEUTIC EXERCISES: CPT | Mod: GP | Performed by: PHYSICAL THERAPIST

## 2018-01-31 NOTE — MR AVS SNAPSHOT
After Visit Summary   1/31/2018    Bj Philip    MRN: [id number]           Patient Information     Date Of Birth          1970        Visit Information        Provider Department      1/31/2018 11:20 AM Oneida Lopez, PT [facility] Physical [facility]        Today's Diagnoses     Acute pain of left shoulder        Aftercare following surgery of the musculoskeletal system        Glenohumeral arthritis, left           Follow-ups after your visit        Your next 10 appointments already scheduled     Feb 08, 2018 11:30 AM CST   DEMETRICE Extremity with Oneida Lopez PT   [facility] ([facility])    [address]  [facility] [address]   [phone]            Feb 15, 2018 10:10 AM CST   DEMETRICE Extremity with Oneida Lopez PT   [facility] ([facility])    [address]  [facility] [address]   [phone]            Feb 22, 2018 10:10 AM CST   DEMETRICE Extremity with Oneida Lopez PT   [facility] Therapy ([facility])    [address]  [facility] [address]   [phone]            Feb 28, 2018 11:20 AM CST   DEMETRICE Extremity with Oneida Lopez PT   [facility] ([facility])    [address]  [facility] [address]   [phone]              Who to contact     If you have questions or need follow up information about today's clinic visit or your schedule please contact [facility] PHYSICAL THERAPY directly at [phone].  Normal or non-critical lab and imaging results will be communicated to you by MyChart, letter or phone within 4 business days after the clinic has received the results. If you do not hear from us within 7 days, please contact the clinic  through "[employer] or phone. If you have a critical or abnormal lab result, we will notify you by phone as soon as possible.  Submit refill requests through "[employer] or call your pharmacy and they will forward the refill request to us. Please allow 3 business days for your refill to be completed.          Additional Information About Your Visit        [employer] Information     "[employer] gives you secure access to your electronic health record. If you see a primary care provider, you can also send messages to your care team and make appointments. If you have questions, please call your primary care clinic.  If you do not have a primary care provider, please call [phone] and they will assist you.        Care EveryWhere ID     This is your Care EveryWhere ID. This could be used by other organizations to access your [place] medical records  [id number]         Blood Pressure from Last 3 Encounters:   12/04/17 108/80   10/18/17 114/76   05/09/17 116/78    Weight from Last 3 Encounters:   12/04/17 103.2 kg (227 lb 8 oz)   10/18/17 102.8 kg (226 lb 9.6 oz)   05/09/17 103.9 kg (229 lb)              We Performed the Following     NEUROMUSCULAR RE-EDUCATION     THERAPEUTIC EXERCISES        Primary Care Provider Office Phone # Fax #    [facility] [phone] [address] [facility] [address]        Equal Access to Services     LILLIE CHILDERS : Hadii paolo ku hadasho Soomaali, waaxda luqadaha, qaybta kaalmada jere, wes alba. So [facility] [phone].    ATENCIÓN: Si habla español, tiene a alcaraz disposición servicios gratuitos de asistencia lingüística. Aashish al [phone].    We comply with applicable federal civil rights laws and [place] laws. We do not discriminate on the basis of race, color, national origin, age, disability, sex, sexual orientation, or gender identity.            Thank you!     Thank you for choosing [facility]  for your care. Our goal is always to provide you with excellent care. Hearing back from our patients is one way we can continue to improve our services. Please take a few minutes to complete the written survey that you may receive in the mail after your visit with us. Thank you!             Your Updated Medication List - Protect others around you: Learn how to safely use, store and throw away your medicines at www.disposemymeds.org.          This list is accurate as of 1/31/18  2:10 PM.  Always use your most recent med list.                   Brand Name Dispense Instructions for use Diagnosis    azithromycin 250 MG tablet    ZITHROMAX    6 tablet    Two tabs today.  One tab daily x 4 days.    Chest congestion       fexofenadine-pseudoePHEDrine  MG per 12 hr tablet    ALLEGRA-D    180 tablet    Take 1 tablet by mouth 2 times daily    Seasonal allergic rhinitis, unspecified allergic rhinitis trigger       fluticasone 50 MCG/ACT spray    FLONASE    48 g    Spray 1-2 sprays into both nostrils daily    Seasonal allergic rhinitis, unspecified allergic rhinitis trigger       HYDROcodone-acetaminophen 5-325 MG per tablet    NORCO    30 tablet    Take 1-2 tablets by mouth every 6 hours as needed for moderate to severe pain    Left shoulder pain, unspecified chronicity       ibuprofen 800 MG tablet    ADVIL/MOTRIN    90 tablet    Take 1 tablet (800 mg) by mouth 3 times daily as needed for pain    Radial styloid tenosynovitis of right hand       sildenafil 20 MG tablet    REVATIO    20 tablet    1-3 tabs daily as needed    Erectile dysfunction, unspecified erectile dysfunction type

## 2018-01-31 NOTE — PROGRESS NOTES
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 He will also get a synvisc injection once approved by WC.       Current pain level: 2/10     Changes in function:  Yes (See Goal flowsheet attached for changes in current functional level)     Adverse reaction to treatment or activity:  None    OBJECTIVE  Changes in objective findings:  Yes, L shoulder  AAROM:    Wand flexion standing=150  ABD vbwu=798  IR/EXT=L1  ER after 90/90 4 corner=57        ASSESSMENT  Abdullai continues to require intervention to meet STG and LTG's: PT  Patient's symptoms are resolving.  Patient is progressing as expected.  Response to therapy has shown an improvement in  pain level and ROM   Progress made towards STG/LTG?  Yes (See Goal flowsheet attached for updates on achievement of STG and LTG)    PLAN  Current treatment program is being advanced to more complex exercises.    PTA/ATC plan:  N/A    Please refer to the daily flowsheet for treatment today, total treatment time and time spent performing 1:1 timed codes.

## 2018-02-08 ENCOUNTER — THERAPY VISIT (OUTPATIENT)
Dept: PHYSICAL THERAPY | Facility: CLINIC | Age: 48
End: 2018-02-08
Payer: OTHER MISCELLANEOUS

## 2018-02-08 DIAGNOSIS — M25.512 ACUTE PAIN OF LEFT SHOULDER: ICD-10-CM

## 2018-02-08 DIAGNOSIS — Z47.89 AFTERCARE FOLLOWING SURGERY OF THE MUSCULOSKELETAL SYSTEM: ICD-10-CM

## 2018-02-08 DIAGNOSIS — M19.012 GLENOHUMERAL ARTHRITIS, LEFT: ICD-10-CM

## 2018-02-08 PROCEDURE — 97140 MANUAL THERAPY 1/> REGIONS: CPT | Mod: GP | Performed by: PHYSICAL THERAPIST

## 2018-02-08 PROCEDURE — 97110 THERAPEUTIC EXERCISES: CPT | Mod: GP | Performed by: PHYSICAL THERAPIST

## 2018-02-08 NOTE — MR AVS SNAPSHOT
After Visit Summary   2/8/2018    Bj Philip    MRN: [id number]           Patient Information     Date Of Birth          1970        Visit Information        Provider Department      2/8/2018 11:30 AM Oneida Lopez, PT [facility] Physical Therapy        Today's Diagnoses     Acute pain of left shoulder        Aftercare following surgery of the musculoskeletal system        Glenohumeral arthritis, left           Follow-ups after your visit        Your next 10 appointments already scheduled     Feb 12, 2018  4:40 PM CST   [facility] with Acacia Petersen MD   [facility]    [address] [facility] [address]   [phone]            Feb 15, 2018 10:10 AM CST   DEMETRICE Extremity with Oneida Lopez PT   [facility] Physical Therapy ([facility])    [address]  [facility] [address]   [phone]            Feb 22, 2018 10:10 AM CST   DEMETRICE Extremity with Oneida Lopez PT   [facility] Therapy ([facility])    [address]  [facility] [address]   [phone]            Feb 28, 2018 11:20 AM CST   DEMETRICE Extremity with Oneida Lopez PT   [facility] ([facility])    [address]  [facility] [address]   [phone]            Mar 05, 2018  9:00 AM CST   Office Visit with Acacia Petersen MD   [facility]    [address] [place] [address]   [phone]           Bring a current list of meds and any records pertaining to this visit. For Physicals, please bring immunization records and any forms needing to be filled out. Please arrive 10 minutes early to complete paperwork.              Who to contact     If you have questions or need  follow up information about today's clinic visit or your schedule please contact [facility] PHYSICAL THERAPY directly at [phone].  Normal or non-critical lab and imaging results will be communicated to you by MyChart, letter or phone within 4 business days after the clinic has received the results. If you do not hear from us within 7 days, please contact the clinic through Visual Threathart or phone. If you have a critical or abnormal lab result, we will notify you by phone as soon as possible.  Submit refill requests through Novalys or call your pharmacy and they will forward the refill request to us. Please allow 3 business days for your refill to be completed.          Additional Information About Your Visit        Visual ThreathareCareDiary Information     Novalys gives you secure access to your electronic health record. If you see a primary care provider, you can also send messages to your care team and make appointments. If you have questions, please call your primary care clinic.  If you do not have a primary care provider, please call [phone] and they will assist you.        Care EveryWhere ID     This is your Care EveryWhere ID. This could be used by other organizations to access your [facility] medical records  [id number]         Blood Pressure from Last 3 Encounters:   12/04/17 108/80   10/18/17 114/76   05/09/17 116/78    Weight from Last 3 Encounters:   12/04/17 103.2 kg (227 lb 8 oz)   10/18/17 102.8 kg (226 lb 9.6 oz)   05/09/17 103.9 kg (229 lb)              We Performed the Following     MANUAL THER TECH,1+REGIONS,EA 15 MIN     THERAPEUTIC EXERCISES        Primary Care Provider Office Phone # Fax #    Acacia Washington Petersen [phone] [address] [facility] [address]        Equal Access to Services     LILLIE CHILDERS : Ike Gomez, mary resendez, candy oquendo, wes alba. So [facility]  [phone].    ATENCIÓN: Si rinku gil, tiene a alcaraz disposición servicios gratuitos de asistencia lingüística. Aashish salazar [phone].    We comply with applicable federal civil rights laws and [place] laws. We do not discriminate on the basis of race, color, national origin, age, disability, sex, sexual orientation, or gender identity.            Thank you!     Thank you for choosing [facility] PHYSICAL [facility]  for your care. Our goal is always to provide you with excellent care. Hearing back from our patients is one way we can continue to improve our services. Please take a few minutes to complete the written survey that you may receive in the mail after your visit with us. Thank you!             Your Updated Medication List - Protect others around you: Learn how to safely use, store and throw away your medicines at www.disposemymeds.org.          This list is accurate as of 2/8/18  2:27 PM.  Always use your most recent med list.                   Brand Name Dispense Instructions for use Diagnosis    azithromycin 250 MG tablet    ZITHROMAX    6 tablet    Two tabs today.  One tab daily x 4 days.    Chest congestion       fexofenadine-pseudoePHEDrine  MG per 12 hr tablet    ALLEGRA-D    180 tablet    Take 1 tablet by mouth 2 times daily    Seasonal allergic rhinitis, unspecified allergic rhinitis trigger       fluticasone 50 MCG/ACT spray    FLONASE    48 g    Spray 1-2 sprays into both nostrils daily    Seasonal allergic rhinitis, unspecified allergic rhinitis trigger       HYDROcodone-acetaminophen 5-325 MG per tablet    NORCO    30 tablet    Take 1-2 tablets by mouth every 6 hours as needed for moderate to severe pain    Left shoulder pain, unspecified chronicity       ibuprofen 800 MG tablet    ADVIL/MOTRIN    90 tablet    Take 1 tablet (800 mg) by mouth 3 times daily as needed for pain    Radial styloid tenosynovitis of right hand       sildenafil 20 MG tablet     REVATIO    20 tablet    1-3 tabs daily as needed    Erectile dysfunction, unspecified erectile dysfunction type

## 2018-02-15 ENCOUNTER — THERAPY VISIT (OUTPATIENT)
Dept: PHYSICAL THERAPY | Facility: CLINIC | Age: 48
End: 2018-02-15
Payer: OTHER MISCELLANEOUS

## 2018-02-15 DIAGNOSIS — Z47.89 AFTERCARE FOLLOWING SURGERY OF THE MUSCULOSKELETAL SYSTEM: ICD-10-CM

## 2018-02-15 DIAGNOSIS — M25.512 ACUTE PAIN OF LEFT SHOULDER: ICD-10-CM

## 2018-02-15 DIAGNOSIS — M19.012 GLENOHUMERAL ARTHRITIS, LEFT: ICD-10-CM

## 2018-02-15 PROCEDURE — 97140 MANUAL THERAPY 1/> REGIONS: CPT | Mod: GP | Performed by: PHYSICAL THERAPIST

## 2018-02-15 PROCEDURE — 97110 THERAPEUTIC EXERCISES: CPT | Mod: GP | Performed by: PHYSICAL THERAPIST

## 2018-02-15 NOTE — PROGRESS NOTES
Subjective:  HPI                    Objective:  System    Physical Exam    General     ROS    Assessment/Plan:    SUBJECTIVE  Subjective changes as noted by pt:  Pt feels like he went a little harder on his exercises 2 days ago and for the last 2 days he has been a little more sore--especially when his hikes his shoulder. He is getting more catching that is worse now than it was prior to the injection. He reports he did 3 sets of 25 and 1 set of 19 reps of push ups and the others were about 30-40 reps. His rib area is feeling better today.       Current pain level: 2/10   In his shoulders  Changes in function:  Yes (See Goal flowsheet attached for changes in current functional level)     Adverse reaction to treatment or activity:  None    OBJECTIVE  Changes in objective findings:  Yes, L shoulder AAROM:    Wand standing zgrhvda=700  ABD=163  EXT=70          ASSESSMENT  Abdullai continues to require intervention to meet STG and LTG's: PT  Patient's symptoms are resolving.  Patient is progressing as expected.  Response to therapy has shown an improvement in  pain level  Progress made towards STG/LTG?  Yes (See Goal flowsheet attached for updates on achievement of STG and LTG)    PLAN  Current treatment program is being advanced to more complex exercises.    PTA/ATC plan:  N/A    Please refer to the daily flowsheet for treatment today, total treatment time and time spent performing 1:1 timed codes.

## 2018-02-15 NOTE — MR AVS SNAPSHOT
After Visit Summary   2/15/2018    Bj Philip    MRN: [id number]           Patient Information     Date Of Birth          1970        Visit Information        Provider Department      2/15/2018 10:10 AM Oneida Lopez, PT [facility] Physical Therapy        Today's Diagnoses     Acute pain of left shoulder        Aftercare following surgery of the musculoskeletal system        Glenohumeral arthritis, left           Follow-ups after your visit        Your next 10 appointments already scheduled     Feb 22, 2018 10:10 AM CST   DEMETRICE Extremity with Oneida Lopez PT   [facility] Physical Therapy ([facility])    [address]  [facility] [address]   [phone]            Feb 28, 2018 11:20 AM CST   DEMETRICE Extremity with Oneida Lopez PT   [facility] Physical Therapy ([facility])    [address]  [facility] [address]   [phone]            Mar 05, 2018  9:00 AM CST   Office Visit with Acacia Petersen MD   [facility] ([facility])    [address] [place] [id number] [phone]           Bring a current list of meds and any records pertaining to this visit. For Physicals, please bring immunization records and any forms needing to be filled out. Please arrive 10 minutes early to complete paperwork.              Who to contact     If you have questions or need follow up information about today's clinic visit or your schedule please contact [facility] PHYSICAL THERAPY directly at [phone].  Normal or non-critical lab and imaging results will be communicated to you by MyChart, letter or phone within 4 business days after the clinic has received the results. If you do not hear from us within 7 days, please contact the clinic through MyChart or phone. If you  have a critical or abnormal lab result, we will notify you by phone as soon as possible.  Submit refill requests through ArrayComm or call your pharmacy and they will forward the refill request to us. Please allow 3 business days for your refill to be completed.          Additional Information About Your Visit        Kannuuhart Information     ArrayComm gives you secure access to your electronic health record. If you see a primary care provider, you can also send messages to your care team and make appointments. If you have questions, please call your primary care clinic.  If you do not have a primary care provider, please call [phone] and they will assist you.        Care EveryWhere ID     This is your Care EveryWhere ID. This could be used by other organizations to access your [place] medical records  [id number]         Blood Pressure from Last 3 Encounters:   12/04/17 108/80   10/18/17 114/76   05/09/17 116/78    Weight from Last 3 Encounters:   12/04/17 103.2 kg (227 lb 8 oz)   10/18/17 102.8 kg (226 lb 9.6 oz)   05/09/17 103.9 kg (229 lb)              We Performed the Following     MANUAL THER TECH,1+REGIONS,EA 15 MIN     THERAPEUTIC EXERCISES        Primary Care Provider Office Phone # Fax #    Acacia Washington Petersen [phone] [address] [facility] [address]        Equal Access to Services     ANDRA CHILDERS : Hadii aad ku hadasho Soomaali, waaxda luqadaha, qaybta kaalmada adeegyada, wes alba. So [facility] [phone].    ATENCIÓN: Si habla español, tiene a alcaraz disposición servicios gratuitos de asistencia lingüística. Aashish al [phone].    We comply with applicable federal civil rights laws and [place] laws. We do not discriminate on the basis of race, color, national origin, age, disability, sex, sexual orientation, or gender identity.            Thank you!     Thank you for choosing [facility]  THERAPY  for your care. Our goal is always to provide you with excellent care. Hearing back from our patients is one way we can continue to improve our services. Please take a few minutes to complete the written survey that you may receive in the mail after your visit with us. Thank you!             Your Updated Medication List - Protect others around you: Learn how to safely use, store and throw away your medicines at www.disposemymeds.org.          This list is accurate as of 2/15/18 11:29 AM.  Always use your most recent med list.                   Brand Name Dispense Instructions for use Diagnosis    azithromycin 250 MG tablet    ZITHROMAX    6 tablet    Two tabs today.  One tab daily x 4 days.    Chest congestion       fexofenadine-pseudoePHEDrine  MG per 12 hr tablet    ALLEGRA-D    180 tablet    Take 1 tablet by mouth 2 times daily    Seasonal allergic rhinitis, unspecified allergic rhinitis trigger       fluticasone 50 MCG/ACT spray    FLONASE    48 g    Spray 1-2 sprays into both nostrils daily    Seasonal allergic rhinitis, unspecified allergic rhinitis trigger       HYDROcodone-acetaminophen 5-325 MG per tablet    NORCO    30 tablet    Take 1-2 tablets by mouth every 6 hours as needed for moderate to severe pain    Left shoulder pain, unspecified chronicity       ibuprofen 800 MG tablet    ADVIL/MOTRIN    90 tablet    Take 1 tablet (800 mg) by mouth 3 times daily as needed for pain    Radial styloid tenosynovitis of right hand       sildenafil 20 MG tablet    REVATIO    20 tablet    1-3 tabs daily as needed    Erectile dysfunction, unspecified erectile dysfunction type

## 2018-02-19 ENCOUNTER — OFFICE VISIT (OUTPATIENT)
Dept: FAMILY MEDICINE | Facility: CLINIC | Age: 48
End: 2018-02-19
Payer: OTHER MISCELLANEOUS

## 2018-02-19 VITALS
SYSTOLIC BLOOD PRESSURE: 106 MMHG | OXYGEN SATURATION: 96 % | WEIGHT: 227.6 LBS | HEART RATE: 86 BPM | BODY MASS INDEX: 27.89 KG/M2 | TEMPERATURE: 99.2 F | DIASTOLIC BLOOD PRESSURE: 69 MMHG

## 2018-02-19 DIAGNOSIS — M25.512 LEFT SHOULDER PAIN, UNSPECIFIED CHRONICITY: ICD-10-CM

## 2018-02-19 PROCEDURE — 99213 OFFICE O/P EST LOW 20 MIN: CPT | Performed by: PHYSICIAN ASSISTANT

## 2018-02-19 RX ORDER — DICLOFENAC SODIUM 75 MG/1
75 TABLET, DELAYED RELEASE ORAL
COMMUNITY
Start: 2018-02-06 | End: 2018-07-03

## 2018-02-19 RX ORDER — MISOPROSTOL 200 UG/1
TABLET ORAL
COMMUNITY
Start: 2018-02-08 | End: 2020-02-21

## 2018-02-19 RX ORDER — IBUPROFEN 800 MG/1
800 TABLET, FILM COATED ORAL 3 TIMES DAILY PRN
Qty: 90 TABLET | Refills: 0 | Status: SHIPPED | OUTPATIENT
Start: 2018-02-19 | End: 2018-12-31

## 2018-02-19 RX ORDER — HYDROCODONE BITARTRATE AND ACETAMINOPHEN 5; 325 MG/1; MG/1
1-2 TABLET ORAL EVERY 6 HOURS PRN
Qty: 30 TABLET | Refills: 0 | Status: SHIPPED | OUTPATIENT
Start: 2018-02-19 | End: 2018-07-03

## 2018-02-19 NOTE — MR AVS SNAPSHOT
After Visit Summary   2/19/2018    Bj Philip    MRN: [id number]           Patient Information     Date Of Birth          1970        Visit Information        Provider Department      2/19/2018 4:20 PM Tracy Espinosa PA-C [facility]        Today's Diagnoses     Left shoulder pain, unspecified chronicity           Follow-ups after your visit        Your next 10 appointments already scheduled     Feb 22, 2018 10:10 AM CST   DEMETRICE Extremity with Oneida Lopez PT   [facility] Physical Therapy ([facility])    [address]  [facility] [address]   [phone]            Feb 26, 2018  4:40 PM CST   PHYSICAL with Acacia Petersen MD   [facility]    [address] [place] [address]   [phone]            Feb 28, 2018 11:20 AM CST   DEMETRICE Extremity with Oneida Lopez PT   [facility] Therapy ([facility])    [address]  [facility] [address]   [phone]            Mar 05, 2018  9:00 AM CST   Office Visit with Acacia Petersen MD   [facility]    [address] [place] [address]   [phone]           Bring a current list of meds and any records pertaining to this visit. For Physicals, please bring immunization records and any forms needing to be filled out. Please arrive 10 minutes early to complete paperwork.              Who to contact     If you have questions or need follow up information about today's clinic visit or your schedule please contact [facility] directly at [phone].  Normal or non-critical lab and imaging results will be communicated to you by MyChart, letter or phone within 4 business days after the clinic has received the results. If you do not hear from us  within 7 days, please contact the clinic through TransferWise or phone. If you have a critical or abnormal lab result, we will notify you by phone as soon as possible.  Submit refill requests through TransferWise or call your pharmacy and they will forward the refill request to us. Please allow 3 business days for your refill to be completed.          Additional Information About Your Visit        XcedexharFoursquare Information     TransferWise gives you secure access to your electronic health record. If you see a primary care provider, you can also send messages to your care team and make appointments. If you have questions, please call your primary care clinic.  If you do not have a primary care provider, please call [phone] and they will assist you.        Care EveryWhere ID     This is your Care EveryWhere ID. This could be used by other organizations to access your [place] medical records  [id number]        Your Vitals Were     Pulse Temperature Pulse Oximetry BMI (Body Mass Index)          86 99.2  F (37.3  C) (Oral) 96% 27.89 kg/m2         Blood Pressure from Last 3 Encounters:   02/19/18 106/69   12/04/17 108/80   10/18/17 114/76    Weight from Last 3 Encounters:   02/19/18 227 lb 9.6 oz (103.2 kg)   12/04/17 227 lb 8 oz (103.2 kg)   10/18/17 226 lb 9.6 oz (102.8 kg)              Today, you had the following     No orders found for display         Today's Medication Changes          These changes are accurate as of 2/19/18  5:02 PM.  If you have any questions, ask your nurse or doctor.               Stop taking these medicines if you haven't already. Please contact your care team if you have questions.     azithromycin 250 MG tablet   Commonly known as:  ZITHROMAX   Stopped by:  Tracy Espinosa PA-C           sildenafil 20 MG tablet   Commonly known as:  REVATIO   Stopped by:  Tracy Espinosa PA-C                Where to get your medicines      These medications were sent to [facility]  [address] - [facility], [place] - [address] [facility] [address]  [address] [facility] [address]    Hours:  24-hours Phone:  [phone]     ibuprofen 800 MG tablet         Some of these will need a paper prescription and others can be bought over the counter.  Ask your nurse if you have questions.     Bring a paper prescription for each of these medications     HYDROcodone-acetaminophen 5-325 MG per tablet                Primary Care Provider Office Phone # Fax #    Acacia Washington Petersen -[phone] [address] [facility] [address]        Equal Access to Services     LILLIE CHILDERS : Hadii paolo luis hadasho Soomaali, waaxda luqadaha, qaybta kaalmada adeegyada, wes benavides . So [facility] [phone].    ATENCIÓN: Si habla español, tiene a alcaraz disposición servicios gratuitos de asistencia lingüística. Llame al [phone].    We comply with applicable federal civil rights laws and [place] laws. We do not discriminate on the basis of race, color, national origin, age, disability, sex, sexual orientation, or gender identity.            Thank you!     Thank you for choosing [facility]  for your care. Our goal is always to provide you with excellent care. Hearing back from our patients is one way we can continue to improve our services. Please take a few minutes to complete the written survey that you may receive in the mail after your visit with us. Thank you!             Your Updated Medication List - Protect others around you: Learn how to safely use, store and throw away your medicines at www.disposemymeds.org.          This list is accurate as of 2/19/18  5:02 PM.  Always use your most recent med list.                   Brand Name Dispense Instructions for use Diagnosis    diclofenac 75 MG EC tablet    VOLTAREN     Take 75 mg by mouth        fexofenadine-pseudoePHEDrine  MG per 12 hr tablet    ALLEGRA-D     180 tablet    Take 1 tablet by mouth 2 times daily    Seasonal allergic rhinitis, unspecified allergic rhinitis trigger       fluticasone 50 MCG/ACT spray    FLONASE    48 g    Spray 1-2 sprays into both nostrils daily    Seasonal allergic rhinitis, unspecified allergic rhinitis trigger       HYDROcodone-acetaminophen 5-325 MG per tablet    NORCO    30 tablet    Take 1-2 tablets by mouth every 6 hours as needed for moderate to severe pain    Left shoulder pain, unspecified chronicity       ibuprofen 800 MG tablet    ADVIL/MOTRIN    90 tablet    Take 1 tablet (800 mg) by mouth 3 times daily as needed for pain    Left shoulder pain, unspecified chronicity       misoprostol 200 MCG tablet    CYTOTEC

## 2018-02-19 NOTE — PROGRESS NOTES
SUBJECTIVE:   Bj Philip is a 47 year old male who presents to clinic today for the following health issues:      Concern - Work comp-shoulder pain  Onset: Ongoing-had shoulder surgery October 23, 2017. Patient is currently on work restrictions dictated by his surgeon. Next surgery follow up is in 2 weeks . Patient reports that he exacerbated shoulder pain at work last week. Patient had to miss work due to shoulder pain yesterday and today.   Patient needs work note and refill on his pain medications     Description:   Left shoulder    Intensity: severe    Progression of Symptoms:  improving    Accompanying Signs & Symptoms:  None    Previous history of similar problem:   Surgery on October    Precipitating factors:   Worsened by: Moving    Alleviating factors:  Improved by: medication    Therapies Tried and outcome: medication        Problem list and histories reviewed & adjusted, as indicated.  Additional history: as documented    Patient Active Problem List   Diagnosis     CARDIOVASCULAR SCREENING; LDL GOAL LESS THAN 160     Seasonal allergies     Pain in joint, lower leg     Vitamin D deficiency     S/P arthroscopy of shoulder     Labral tear of shoulder, left, initial encounter     Glenohumeral arthritis, left     Erectile dysfunction, unspecified erectile dysfunction type     Acute pain of left shoulder     Aftercare following surgery of the musculoskeletal system     Past Surgical History:   Procedure Laterality Date     ARTHROPLASTY CARPOMETACARPAL (THUMB JOINT)      LT revision, Dr. Alok Rivera     ARTHROPLASTY CARPOMETACARPAL (THUMB JOINT), ARTHRODESIS, COMBINED  8/4/2011    Procedure:COMBINED ARTHROPLASTY CARPOMETACARPAL (THUMB JOINT), ARTHRODESIS; Metacarpalphalangeal Joint Fusion; Surgeon:ABNER GONZALEZ; [facility]     ARTHROSCOPY SHOULDER RT/LT  8/10    LT decompression of impingement     REPAIR GAMEKEEPER'S THUMB  8/4/2011    Procedure:REPAIR LIGAMENT ULNAR COLLATERAL THUMB  (GAMEKEEPER'S); Thumb Radial Reconstruction    ; Surgeon:ABNER GONZALEZ; [facility]       Social History   Substance Use Topics     Smoking status: Never Smoker     Smokeless tobacco: Never Used     Alcohol use No     Family History   Problem Relation Age of Onset     Anesthesia Reaction No family hx of      Blood Disease No family hx of      Hypertension No family hx of      CEREBROVASCULAR DISEASE No family hx of      C.A.D. No family hx of      DIABETES No family hx of      CANCER No family hx of          Current Outpatient Prescriptions   Medication Sig Dispense Refill     HYDROcodone-acetaminophen (NORCO) 5-325 MG per tablet Take 1-2 tablets by mouth every 6 hours as needed for moderate to severe pain 30 tablet 0     ibuprofen (ADVIL/MOTRIN) 800 MG tablet Take 1 tablet (800 mg) by mouth 3 times daily as needed for pain 90 tablet 0     diclofenac (VOLTAREN) 75 MG EC tablet Take 75 mg by mouth       misoprostol (CYTOTEC) 200 MCG tablet        fexofenadine-pseudoePHEDrine (ALLEGRA-D)  MG per 12 hr tablet Take 1 tablet by mouth 2 times daily (Patient not taking: Reported on 2/19/2018) 180 tablet 3     fluticasone (FLONASE) 50 MCG/ACT spray Spray 1-2 sprays into both nostrils daily (Patient not taking: Reported on 2/19/2018) 48 g 1     No Known Allergies    Reviewed and updated as needed this visit by clinical staff  Tobacco  Allergies  Meds  Med Hx  Surg Hx  Fam Hx  Soc Hx      Reviewed and updated as needed this visit by Provider         ROS:  Constitutional, HEENT, cardiovascular, pulmonary, GI, , musculoskeletal, neuro, skin, endocrine and psych systems are negative, except as otherwise noted.    OBJECTIVE:     /69  Pulse 86  Temp 99.2  F (37.3  C) (Oral)  Wt 227 lb 9.6 oz (103.2 kg)  SpO2 96%  BMI 27.89 kg/m2  Body mass index is 27.89 kg/(m^2).  GENERAL: healthy, alert and no distress  MS: left shoulder: no swelling, no erythema, LROM with abduction and flexion. 4 well healed  incisions  are present     Diagnostic Test Results:  none     ASSESSMENT/PLAN:       ICD-10-CM    1. Left shoulder pain, unspecified chronicity M25.512 HYDROcodone-acetaminophen (NORCO) 5-325 MG per tablet     ibuprofen (ADVIL/MOTRIN) 800 MG tablet   Norco and Ibuprofen were refilled.   Patient will follow up with surgeon in 2 weeks         Tracy Espinosa PA-C  [facility]

## 2018-02-19 NOTE — LETTER
[address] [address]  Phone: [phone]    February 19, 2018        Bj Philip  [address] [place] [id number]          To whom it may concern:    RE: Bj Philip    Patient was seen and treated today at our clinic and missed work 2/18/18-2/21/18    Please contact me for questions or concerns.      Sincerely,        Shana Espinosa PAC

## 2018-02-26 ENCOUNTER — OFFICE VISIT (OUTPATIENT)
Dept: FAMILY MEDICINE | Facility: CLINIC | Age: 48
End: 2018-02-26
Payer: COMMERCIAL

## 2018-02-26 VITALS
OXYGEN SATURATION: 99 % | TEMPERATURE: 98.1 F | RESPIRATION RATE: 18 BRPM | HEIGHT: 75 IN | BODY MASS INDEX: 28.47 KG/M2 | DIASTOLIC BLOOD PRESSURE: 60 MMHG | WEIGHT: 229 LBS | HEART RATE: 60 BPM | SYSTOLIC BLOOD PRESSURE: 104 MMHG

## 2018-02-26 DIAGNOSIS — Z00.00 ROUTINE GENERAL MEDICAL EXAMINATION AT A HEALTH CARE FACILITY: Primary | ICD-10-CM

## 2018-02-26 DIAGNOSIS — Z71.84 TRAVEL ADVICE ENCOUNTER: ICD-10-CM

## 2018-02-26 PROCEDURE — 99396 PREV VISIT EST AGE 40-64: CPT | Performed by: FAMILY MEDICINE

## 2018-02-26 RX ORDER — MEFLOQUINE HYDROCHLORIDE 250 MG/1
250 TABLET ORAL
Qty: 6 TABLET | Refills: 0 | Status: SHIPPED | OUTPATIENT
Start: 2018-02-26 | End: 2018-07-03

## 2018-02-26 NOTE — PROGRESS NOTES
SUBJECTIVE:   CC: Bj Philip is an 47 year old male who presents for preventative health visit.     Healthy Habits:    Do you get at least three servings of calcium containing foods daily (dairy, green leafy vegetables, etc.)? yes    Amount of exercise or daily activities, outside of work: 4 day(s) per week    Problems taking medications regularly No    Medication side effects: No    Have you had an eye exam in the past two years? no    Do you see a dentist twice per year? yes    Do you have sleep apnea, excessive snoring or daytime drowsiness?yes snoring            Today's PHQ-2 Score:   PHQ-2 ( 1999 [employer]) 2/26/2018 2/19/2018   Q1: Little interest or pleasure in doing things 0 0   Q2: Feeling down, depressed or hopeless 0 0   PHQ-2 Score 0 0       Abuse: Current or Past(Physical, Sexual or Emotional)- No  Do you feel safe in your environment - Yes    Social History   Substance Use Topics     Smoking status: Never Smoker     Smokeless tobacco: Never Used     Alcohol use No      If you drink alcohol do you typically have >3 drinks per day or >7 drinks per week? No                      Last PSA: No results found for: PSA    Reviewed orders with patient. Reviewed health maintenance and updated orders accordingly - Yes  Labs reviewed in EPIC  BP Readings from Last 3 Encounters:   02/26/18 104/60   02/19/18 106/69   12/04/17 108/80    Wt Readings from Last 3 Encounters:   02/26/18 103.9 kg (229 lb)   02/19/18 103.2 kg (227 lb 9.6 oz)   12/04/17 103.2 kg (227 lb 8 oz)                    Reviewed and updated as needed this visit by clinical staff  Tobacco  Allergies  Meds  Med Hx  Surg Hx  Fam Hx  Soc Hx        Reviewed and updated as needed this visit by Provider        Here today for routine checkup.  Doing well overall.  Recovering from left shoulder surgery.  Might be taking a trip within the next week to [place].  Wants to have mefloquine on hand.    ROS:  C: NEGATIVE for fever, chills, change in  "weight  I: NEGATIVE for worrisome rashes, moles or lesions  E: NEGATIVE for vision changes or irritation  ENT: NEGATIVE for ear, mouth and throat problems  R: NEGATIVE for significant cough or SOB  CV: NEGATIVE for chest pain, palpitations or peripheral edema  GI: NEGATIVE for nausea, abdominal pain, heartburn, or change in bowel habits   male: negative for dysuria, hematuria, decreased urinary stream, erectile dysfunction, urethral discharge  M: NEGATIVE for significant arthralgias or myalgia  N: NEGATIVE for weakness, dizziness or paresthesias  P: NEGATIVE for changes in mood or affect    OBJECTIVE:   Ht 1.9 m (6' 2.8\")  Wt 103.9 kg (229 lb)  BMI 28.77 kg/m2  EXAM:  GENERAL: healthy, alert and no distress  EYES: Eyes grossly normal to inspection, PERRL and conjunctivae and sclerae normal  HENT: ear canals and TM's normal, nose and mouth without ulcers or lesions  NECK: no adenopathy, no asymmetry, masses, or scars and thyroid normal to palpation  RESP: lungs clear to auscultation - no rales, rhonchi or wheezes  CV: regular rate and rhythm, normal S1 S2, no S3 or S4, no murmur, click or rub, no peripheral edema and peripheral pulses strong  ABDOMEN: soft, nontender, no hepatosplenomegaly, no masses and bowel sounds normal  MS: no gross musculoskeletal defects noted, no edema  SKIN: no suspicious lesions or rashes  NEURO: Normal strength and tone, mentation intact and speech normal  PSYCH: mentation appears normal, affect normal/bright    ASSESSMENT/PLAN:   1. Routine general medical examination at a health care facility  Health care maintenance up to date otherwise     2. Travel advice encounter  Immunizations up-to-date  - mefloquine (LARIAM) 250 MG tablet; Take 1 tablet (250 mg) by mouth every 7 days Continue 4 weeks after return.  Dispense: 6 tablet; Refill: 0    COUNSELING:  Reviewed preventive health counseling, as reflected in patient instructions       Regular exercise       Healthy diet/nutrition       " "Vision screening       Safe sex practices/STD prevention       reports that he has never smoked. He has never used smokeless tobacco.    Estimated body mass index is 28.77 kg/(m^2) as calculated from the following:    Height as of this encounter: 1.9 m (6' 2.8\").    Weight as of this encounter: 103.9 kg (229 lb).       Counseling Resources:  ATP IV Guidelines  Pooled Cohorts Equation Calculator  FRAX Risk Assessment  ICSI Preventive Guidelines  Dietary Guidelines for Americans, 2010  USDA's MyPlate  ASA Prophylaxis  Lung CA Screening    Acacia Petersen MD  [facility]  "

## 2018-02-26 NOTE — MR AVS SNAPSHOT
After Visit Summary   2/26/2018    Bj Philip    MRN: [id number]           Patient Information     Date Of Birth          1970        Visit Information        Provider Department      2/26/2018 4:40 PM Acacia Petersen MD [facility]        Today's Diagnoses     Routine general medical examination at a health care facility    -  1    Travel advice encounter          Care Instructions      Preventive Health Recommendations  Male Ages 40 to 49    Yearly exam:             See your health care provider every year in order to  o   Review health changes.   o   Discuss preventive care.    o   Review your medicines if your doctor has prescribed any.    You should be tested each year for STDs (sexually transmitted diseases) if you re at risk.     Have a cholesterol test every 5 years.     Have a colonoscopy (test for colon cancer) if someone in your family has had colon cancer or polyps before age 50.     After age 45, have a diabetes test (fasting glucose). If you are at risk for diabetes, you should have this test every 3 years.      Talk with your health care provider about whether or not a prostate cancer screening test (PSA) is right for you.    Shots: Get a flu shot each year. Get a tetanus shot every 10 years.     Nutrition:    Eat at least 5 servings of fruits and vegetables daily.     Eat whole-grain bread, whole-wheat pasta and brown rice instead of white grains and rice.     Talk to your provider about Calcium and Vitamin D.     Lifestyle    Exercise for at least 150 minutes a week (30 minutes a day, 5 days a week). This will help you control your weight and prevent disease.     Limit alcohol to one drink per day.     No smoking.     Wear sunscreen to prevent skin cancer.     See your dentist every six months for an exam and cleaning.              Follow-ups after your visit        Follow-up notes from your care team     Return in about 1 year (around 2/26/2019).       Your next 10 appointments already scheduled     Feb 28, 2018 11:20 AM CST   DEMETRICE Extremity with Oneida Lopez PT   [facility] ([facility])    [address] [facility]  [facility] [address]   [phone]            Mar 05, 2018  9:00 AM CST   [facility]    [address] [facility] [address] [phone]           Bring a current list of meds and any records pertaining to this visit. For Physicals, please bring immunization records and any forms needing to be filled out. Please arrive 10 minutes early to complete paperwork.              Who to contact     If you have questions or need follow up information about today's clinic visit or your schedule please contact [facility] directly at [phone].  Normal or non-critical lab and imaging results will be communicated to you by Miirahart, letter or phone within 4 business days after the clinic has received the results. If you do not hear from us within 7 days, please contact the clinic through crossvertiset or phone. If you have a critical or abnormal lab result, we will notify you by phone as soon as possible.  Submit refill requests through ePub Direct or call your pharmacy and they will forward the refill request to us. Please allow 3 business days for your refill to be completed.          Additional Information About Your Visit        Miirahart Information     ePub Direct gives you secure access to your electronic health record. If you see a primary care provider, you can also send messages to your care team and make appointments. If you have questions, please call your primary care clinic.  If you do not have a primary care provider, please call [phone] and they will assist you.        Care EveryWhere ID     This is your Care EveryWhere ID. This could be used by other organizations to  "access your [place] medical records  [id number]        Your Vitals Were     Pulse Temperature Respirations Height Pulse Oximetry BMI (Body Mass Index)    60 98.1  F (36.7  C) (Oral) 18 1.9 m (6' 2.8\") 99% 28.77 kg/m2       Blood Pressure from Last 3 Encounters:   02/26/18 104/60   02/19/18 106/69   12/04/17 108/80    Weight from Last 3 Encounters:   02/26/18 103.9 kg (229 lb)   02/19/18 103.2 kg (227 lb 9.6 oz)   12/04/17 103.2 kg (227 lb 8 oz)              Today, you had the following     No orders found for display         Today's Medication Changes          These changes are accurate as of 2/26/18  5:31 PM.  If you have any questions, ask your nurse or doctor.               Start taking these medicines.        Dose/Directions    mefloquine 250 MG tablet   Commonly known as:  LARIAM   Used for:  Travel advice encounter   Started by:  Acacia Petersen MD        Dose:  250 mg   Take 1 tablet (250 mg) by mouth every 7 days Continue 4 weeks after return.   Quantity:  6 tablet   Refills:  0            Where to get your medicines      Some of these will need a paper prescription and others can be bought over the counter.  Ask your nurse if you have questions.     Bring a paper prescription for each of these medications     mefloquine 250 MG tablet                Primary Care Provider Office Phone # Fax #    Acacia Petersen -[phone] [address] [facility] [address]        Equal Access to Services     [facility] AH: Hadii aad ku hadasho Soomaali, waaxda luqadaha, qaybta kaalmada adeegyada, waxay farnaz alba. So [facility] [phone].    ATENCIÓN: Si habla español, tiene a alcaraz disposición servicios gratuitos de asistencia lingüística. Llame al [phone].    We comply with applicable federal civil rights laws and [place] laws. We do not discriminate on the basis of race, color, national origin, age, disability, sex, sexual orientation, or gender " identity.            Thank you!     Thank you for choosing [facility]  for your care. Our goal is always to provide you with excellent care. Hearing back from our patients is one way we can continue to improve our services. Please take a few minutes to complete the written survey that you may receive in the mail after your visit with us. Thank you!             Your Updated Medication List - Protect others around you: Learn how to safely use, store and throw away your medicines at www.disposemymeds.org.          This list is accurate as of 2/26/18  5:31 PM.  Always use your most recent med list.                   Brand Name Dispense Instructions for use Diagnosis    diclofenac 75 MG EC tablet    VOLTAREN     Take 75 mg by mouth        fexofenadine-pseudoePHEDrine  MG per 12 hr tablet    ALLEGRA-D    180 tablet    Take 1 tablet by mouth 2 times daily    Seasonal allergic rhinitis, unspecified allergic rhinitis trigger       fluticasone 50 MCG/ACT spray    FLONASE    48 g    Spray 1-2 sprays into both nostrils daily    Seasonal allergic rhinitis, unspecified allergic rhinitis trigger       HYDROcodone-acetaminophen 5-325 MG per tablet    NORCO    30 tablet    Take 1-2 tablets by mouth every 6 hours as needed for moderate to severe pain    Left shoulder pain, unspecified chronicity       ibuprofen 800 MG tablet    ADVIL/MOTRIN    90 tablet    Take 1 tablet (800 mg) by mouth 3 times daily as needed for pain    Left shoulder pain, unspecified chronicity       mefloquine 250 MG tablet    LARIAM    6 tablet    Take 1 tablet (250 mg) by mouth every 7 days Continue 4 weeks after return.    Travel advice encounter       misoprostol 200 MCG tablet    CYTOTEC

## 2018-02-26 NOTE — NURSING NOTE
"Chief Complaint   Patient presents with     Physical       Initial Ht 1.9 m (6' 2.8\")  Wt 103.9 kg (229 lb)  BMI 28.77 kg/m2 Estimated body mass index is 28.77 kg/(m^2) as calculated from the following:    Height as of this encounter: 1.9 m (6' 2.8\").    Weight as of this encounter: 103.9 kg (229 lb).  Medication Reconciliation: carly Francisco, Medical Assistant      "

## 2018-02-28 ENCOUNTER — THERAPY VISIT (OUTPATIENT)
Dept: PHYSICAL THERAPY | Facility: CLINIC | Age: 48
End: 2018-02-28
Payer: OTHER MISCELLANEOUS

## 2018-02-28 DIAGNOSIS — M25.512 ACUTE PAIN OF LEFT SHOULDER: ICD-10-CM

## 2018-02-28 DIAGNOSIS — Z47.89 AFTERCARE FOLLOWING SURGERY OF THE MUSCULOSKELETAL SYSTEM: ICD-10-CM

## 2018-02-28 DIAGNOSIS — M19.012 GLENOHUMERAL ARTHRITIS, LEFT: ICD-10-CM

## 2018-02-28 PROCEDURE — 97112 NEUROMUSCULAR REEDUCATION: CPT | Mod: GP | Performed by: PHYSICAL THERAPIST

## 2018-02-28 PROCEDURE — 97110 THERAPEUTIC EXERCISES: CPT | Mod: GP | Performed by: PHYSICAL THERAPIST

## 2018-02-28 NOTE — PROGRESS NOTES
Subjective:  HPI                    Objective:  System    Physical Exam    General     ROS    Assessment/Plan:    SUBJECTIVE  Subjective changes as noted by pt:  Pt reports he is pain free right now--it's been better since his last visit. He feels like he is 65% better. He states his biggest limitation is still reaching out to the side and lifting overhead.        Current pain level: 0/10     Changes in function:  Yes (See Goal flowsheet attached for changes in current functional level)     Adverse reaction to treatment or activity:  None    OBJECTIVE  Changes in objective findings:  Yes, L shoulder AAROM:    Wand standing [id number]=636  ABD=158    AROM after odiossxwvv=069/110  and after thoracic extension= 150/130    ASSESSMENT  Abdullai continues to require intervention to meet STG and LTG's: PT  Patient's symptoms are resolving.  Patient is progressing as expected.  Response to therapy has shown an improvement in  pain level, ROM  and flexibility  Progress made towards STG/LTG?  Yes (See Goal flowsheet attached for updates on achievement of STG and LTG)    PLAN  Current treatment program is being advanced to more complex exercises.    PTA/ATC plan:  N/A    Please refer to the daily flowsheet for treatment today, total treatment time and time spent performing 1:1 timed codes.

## 2018-02-28 NOTE — MR AVS SNAPSHOT
After Visit Summary   2/28/2018    Bj Philip    MRN: [id number]           Patient Information     Date Of Birth          1970        Visit Information        Provider Department      2/28/2018 11:20 AM Oneida Lopez PT [facility]        Today's Diagnoses     Acute pain of left shoulder        Aftercare following surgery of the musculoskeletal system        Glenohumeral arthritis, left           Follow-ups after your visit        Your next 10 appointments already scheduled     Mar 05, 2018  9:00 AM CST   [facility]    [address] [facility] [id number] [phone]           Bring a current list of meds and any records pertaining to this visit. For Physicals, please bring immunization records and any forms needing to be filled out. Please arrive 10 minutes early to complete paperwork.            Mar 13, 2018 11:30 AM CDT   DEMETRICE Extremity with Oneida Lopez PT   [facility] ([facility])    [address]  [facility] [id number] [phone]              Who to contact     If you have questions or need follow up information about today's clinic visit or your schedule please contact [facility] directly at [phone].  Normal or non-critical lab and imaging results will be communicated to you by MyChart, letter or phone within 4 business days after the clinic has received the results. If you do not hear from us within 7 days, please contact the clinic through MyChart or phone. If you have a critical or abnormal lab result, we will notify you by phone as soon as possible.  Submit refill requests through [employer] or call your pharmacy and they will forward the refill request to us. Please allow 3 business days  for your refill to be completed.          Additional Information About Your Visit        MyChart Information     EletrogÃƒÂ³eshart gives you secure access to your electronic health record. If you see a primary care provider, you can also send messages to your care team and make appointments. If you have questions, please call your primary care clinic.  If you do not have a primary care provider, please call [phone] and they will assist you.        Care EveryWhere ID     This is your Care EveryWhere ID. This could be used by other organizations to access your [place] medical records  [id number]         Blood Pressure from Last 3 Encounters:   02/26/18 104/60   02/19/18 106/69   12/04/17 108/80    Weight from Last 3 Encounters:   02/26/18 103.9 kg (229 lb)   02/19/18 103.2 kg (227 lb 9.6 oz)   12/04/17 103.2 kg (227 lb 8 oz)              We Performed the Following     NEUROMUSCULAR RE-EDUCATION     THERAPEUTIC EXERCISES        Primary Care Provider Office Phone # Fax #    Acacia [place] Petersen -[phone] [address] [facility] [address]        Equal Access to Services     [facility]: Hadii aad ku hadasho Soomaali, waaxda luqadaha, qaybta kaalmada adeegyada, wes benavides . So [facility] [phone].    ATENCIÓN: Si habla español, tiene a alcaraz disposición servicios gratuitos de asistencia lingüística. [facility] [phone].    We comply with applicable federal civil rights laws and [place] laws. We do not discriminate on the basis of race, color, national origin, age, disability, sex, sexual orientation, or gender identity.            Thank you!     Thank you for choosing [facility]  for your care. Our goal is always to provide you with excellent care. Hearing back from our patients is one way we can continue to improve our services. Please take a few minutes to complete the written survey that you may receive  in the mail after your visit with us. Thank you!             Your Updated Medication List - Protect others around you: Learn how to safely use, store and throw away your medicines at www.disposemymeds.org.          This list is accurate as of 2/28/18  1:59 PM.  Always use your most recent med list.                   Brand Name Dispense Instructions for use Diagnosis    diclofenac 75 MG EC tablet    VOLTAREN     Take 75 mg by mouth        fexofenadine-pseudoePHEDrine  MG per 12 hr tablet    ALLEGRA-D    180 tablet    Take 1 tablet by mouth 2 times daily    Seasonal allergic rhinitis, unspecified allergic rhinitis trigger       fluticasone 50 MCG/ACT spray    FLONASE    48 g    Spray 1-2 sprays into both nostrils daily    Seasonal allergic rhinitis, unspecified allergic rhinitis trigger       HYDROcodone-acetaminophen 5-325 MG per tablet    NORCO    30 tablet    Take 1-2 tablets by mouth every 6 hours as needed for moderate to severe pain    Left shoulder pain, unspecified chronicity       ibuprofen 800 MG tablet    ADVIL/MOTRIN    90 tablet    Take 1 tablet (800 mg) by mouth 3 times daily as needed for pain    Left shoulder pain, unspecified chronicity       mefloquine 250 MG tablet    LARIAM    6 tablet    Take 1 tablet (250 mg) by mouth every 7 days Continue 4 weeks after return.    Travel advice encounter       misoprostol 200 MCG tablet    CYTOTEC

## 2018-02-28 NOTE — LETTER
[facility] PH[facility]ICAL [facility]  [address]  [place] [facility] [address] [phone]    2018    Re: Bj Philip   :   1970  MRN:  [id number]   REFERRING PHYSICIAN:   [place]  [facility] [place]    [place] of Initial Evaluation: 2017  Visits:  Rxs Used: 14  Reason for Referral:     Acute pain of left shoulder  Aftercare following surgery of the musculoskeletal system  Glenohumeral arthritis, left    SUBJECTIVE  Subjective changes as noted by pt:  Pt reports he is pain free right now--it's been better since his last visit. He feels like he is 65% better. He states his biggest limitation is still reaching out to the side and lifting overhead.   Current pain level: 0/10     Changes in function:  Yes (See Goal flowsheet attached for changes in current functional level)     Adverse reaction to treatment or activity:  None  OBJECTIVE  Changes in objective findings:  Yes, L shoulder AAROM:  Wand standing ugxpbpb=817  ABD=158  AROM after zbccofsobm=195/110  and after thoracic extension= 150/130  ASSESSMENT  Bj continues to require intervention to meet STG and LTG's: PT  Patient's symptoms are resolving.  Patient is progressing as expected.  Response to therapy has shown an improvement in  pain level, ROM  and flexibility  Progress made towards STG/LTG?  Yes (See Goal flowsheet attached for updates on achievement of STG and LTG)  PLAN  Current treatment program is being advanced to more complex exercises.  PTA/ATC plan:  N/A  Thank you for your referral.    INQUIRIES  Therapist: Oneida Lopez DPT, [facility]  [address]  [place] [address]  Phone: [phone]  Fax: [phone]

## 2018-03-05 ENCOUNTER — OFFICE VISIT (OUTPATIENT)
Dept: FAMILY MEDICINE | Facility: CLINIC | Age: 48
End: 2018-03-05
Payer: OTHER MISCELLANEOUS

## 2018-03-05 ENCOUNTER — RADIANT APPOINTMENT (OUTPATIENT)
Dept: GENERAL RADIOLOGY | Facility: CLINIC | Age: 48
End: 2018-03-05
Attending: FAMILY MEDICINE
Payer: OTHER MISCELLANEOUS

## 2018-03-05 VITALS
BODY MASS INDEX: 29.58 KG/M2 | HEIGHT: 74 IN | SYSTOLIC BLOOD PRESSURE: 108 MMHG | TEMPERATURE: 98.4 F | OXYGEN SATURATION: 99 % | RESPIRATION RATE: 18 BRPM | HEART RATE: 79 BPM | DIASTOLIC BLOOD PRESSURE: 70 MMHG | WEIGHT: 230.5 LBS

## 2018-03-05 DIAGNOSIS — Z98.890 S/P ARTHROSCOPY OF SHOULDER: Primary | ICD-10-CM

## 2018-03-05 DIAGNOSIS — Z98.890 S/P ARTHROSCOPY OF SHOULDER: ICD-10-CM

## 2018-03-05 PROCEDURE — 73030 X-RAY EXAM OF SHOULDER: CPT | Mod: LT

## 2018-03-05 PROCEDURE — 99214 OFFICE O/P EST MOD 30 MIN: CPT | Performed by: FAMILY MEDICINE

## 2018-03-05 RX ORDER — METHYLPREDNISOLONE 4 MG
TABLET, DOSE PACK ORAL
Qty: 21 TABLET | Refills: 0 | Status: SHIPPED | OUTPATIENT
Start: 2018-03-05 | End: 2018-03-05

## 2018-03-05 RX ORDER — OXYCODONE AND ACETAMINOPHEN 10; 325 MG/1; MG/1
1 TABLET ORAL EVERY 4 HOURS PRN
Qty: 40 TABLET | Refills: 0 | Status: SHIPPED | OUTPATIENT
Start: 2018-03-05 | End: 2018-07-03

## 2018-03-05 NOTE — LETTER
March 5, 2018        Bj Philip  [address] [place] [address]          To whom it may concern:    RE: Bj Philip    No use of left arm until follow up with his surgeon.    Please contact me for questions or concerns.      Sincerely,        Acacia Petersen MD

## 2018-03-05 NOTE — NURSING NOTE
"Chief Complaint   Patient presents with     Fall       Initial /70 (BP Location: Right arm, Patient Position: Sitting, Cuff Size: Adult Large)  Pulse 79  Temp 98.4  F (36.9  C) (Oral)  Resp 18  Ht 1.88 m (6' 2\")  Wt 104.6 kg (230 lb 8 oz)  SpO2 99%  BMI 29.59 kg/m2 Estimated body mass index is 29.59 kg/(m^2) as calculated from the following:    Height as of this encounter: 1.88 m (6' 2\").    Weight as of this encounter: 104.6 kg (230 lb 8 oz).  Medication Reconciliation: carly Vallejo        "

## 2018-03-05 NOTE — PROGRESS NOTES
"  SUBJECTIVE:   Bj Philip is a 47 year old male who presents to clinic today for the following health issues:      Joint Pain    Onset: yesterday    Description:   Location: left shoulder  Character: Sharp and Dull ache    Intensity: moderate    Progression of Symptoms: same    Accompanying Signs & Symptoms:  Other symptoms: none    History:   Previous similar pain: no       Precipitating factors:   Trauma or overuse: YES- fell yesterday in the kitchen    Alleviating factors:  Improved by: nothing    Therapies Tried and outcome: ice and pain killers helped with sleep but that didn't last     SUBJECTIVE:  Here today with the above.  Status post arthroscopy on his left shoulder in October.  Had been successfully moving through the rehabilitation process.  But had a slip in his kitchen yesterday in which he caught his full body weight onto his left arm in a forced abduction motion.  Since that time can barely move at all and is in significant pain.  Wants to make sure he did not cause any significant damage.  Has a follow-up with his surgeon scheduled in 2 weeks.    Review of systems otherwise negative.  Past medical, family, and social history reviewed and updated in chart.    OBJECTIVE:  /70 (BP Location: Right arm, Patient Position: Sitting, Cuff Size: Adult Large)  Pulse 79  Temp 98.4  F (36.9  C) (Oral)  Resp 18  Ht 1.88 m (6' 2\")  Wt 104.6 kg (230 lb 8 oz)  SpO2 99%  BMI 29.59 kg/m2  Alert and pleasant but obviously in some discomfort.  Holding left arm close to his body  S1 and S2 normal, no murmurs, clicks, gallops or rubs. Regular rate and rhythm. Chest is clear; no wheezes or rales. No edema or JVD.  Left shoulder -no obvious gross structural changes and no specific palpable tenderness.  But I cannot get much of an exam at all with even close body of range of motion tests extremely painful  Normal pulses and sensation in his forearm and hand  Past labs reviewed with the patient.   XRAY - my " independent reading of the films showed no obvious malposition or fracture    ASSESSMENT / PLAN:  (Z98.890) S/P arthroscopy of shoulder  (primary encounter diagnosis)  Comment: I suspect a significant soft tissue injury in the shoulder that is still only a few months postop.  At this point I cannot get enough of an exam to know if he caused any damage or not and our goal at this point his pain relief and reduction of inflammation.  He declines oral steroids as they cause him the hiccups.  He will continue with his oral anti-inflammatory and you we will use pain medication, topical relief, gentle range of motion activities.  One-handed work only until he sees his surgeon  Plan: XR Shoulder Left G/E 3 Views,         oxyCODONE-acetaminophen (PERCOCET)  MG         per tablet, DISCONTINUED: methylPREDNISolone         (MEDROL DOSEPAK) 4 MG tablet            Follow up as needed   S. [place] Petersen MD    (Chart documentation completed in part with Dragon voice-recognition software.  Even though reviewed some grammatical, spelling, and word errors may remain.)

## 2018-03-05 NOTE — MR AVS SNAPSHOT
After Visit Summary   3/5/2018    Bj Philip    MRN: [id number]           Patient Information     Date Of Birth          1970        Visit Information        Provider Department      3/5/2018 9:00 AM Acacia Petersen MD [facility]        Today's Diagnoses     S/P arthroscopy of shoulder    -  1       Follow-ups after your visit        Follow-up notes from your care team     Return if symptoms worsen or fail to improve.      Your next 10 appointments already scheduled     Mar 13, 2018 11:30 AM CDT   DEMETRICE Extremity with Oneida Lopez PT   [facility] ([facility])    [address]  [facility] [address] [phone]              Who to contact     If you have questions or need follow up information about today's clinic visit or your schedule please contact [facility] directly at [phone].  Normal or non-critical lab and imaging results will be communicated to you by MyChart, letter or phone within 4 business days after the clinic has received the results. If you do not hear from us within 7 days, please contact the clinic through Kare[employer] or phone. If you have a critical or abnormal lab result, we will notify you by phone as soon as possible.  Submit refill requests through Viewpoint or call your pharmacy and they will forward the refill request to us. Please allow 3 business days for your refill to be completed.          Additional Information About Your Visit        MyChart Information     Viewpoint gives you secure access to your electronic health record. If you see a primary care provider, you can also send messages to your care team and make appointments. If you have questions, please call your primary care clinic.  If you do not have a primary care provider, please call [phone] and they will assist you.        Care EveryWhere ID     This is your Care EveryWhere ID. This  "could be used by other organizations to access your [facility] medical records  [id number]        Your Vitals Were     Pulse Temperature Respirations Height Pulse Oximetry BMI (Body Mass Index)    79 98.4  F (36.9  C) (Oral) 18 1.88 m (6' 2\") 99% 29.59 kg/m2       Blood Pressure from Last 3 Encounters:   03/05/18 108/70   02/26/18 104/60   02/19/18 106/69    Weight from Last 3 Encounters:   03/05/18 104.6 kg (230 lb 8 oz)   02/26/18 103.9 kg (229 lb)   02/19/18 103.2 kg (227 lb 9.6 oz)                 Today's Medication Changes          These changes are accurate as of 3/5/18 10:03 AM.  If you have any questions, ask your nurse or doctor.               Start taking these medicines.        Dose/Directions    oxyCODONE-acetaminophen  MG per tablet   Commonly known as:  PERCOCET   Used for:  S/P arthroscopy of shoulder   Started by:  Acacia Petersen MD        Dose:  1 tablet   Take 1 tablet by mouth every 4 hours as needed for pain   Quantity:  40 tablet   Refills:  0            Where to get your medicines      Some of these will need a paper prescription and others can be bought over the counter.  Ask your nurse if you have questions.     Bring a paper prescription for each of these medications     oxyCODONE-acetaminophen  MG per tablet                Primary Care Provider Office Phone # Fax #    Acacia Petersen [phone] [address] [address]        Equal Access to Services     [facility] AH: Hadii paolo luis hadasho Sovikkiali, waaxda luqadaha, qaybta kaalmada adeegyada, wes alba. So [facility] [phone].    ATENCIÓN: Si habla español, tiene a alcaraz disposición servicios gratuitos de asistencia lingüística. Llame al [phone].    We comply with applicable federal civil rights laws and [place] laws. We do not discriminate on the basis of race, color, national origin, age, disability, sex, sexual orientation, or gender " identity.            Thank you!     Thank you for choosing [facility]  for your care. Our goal is always to provide you with excellent care. Hearing back from our patients is one way we can continue to improve our services. Please take a few minutes to complete the written survey that you may receive in the mail after your visit with us. Thank you!             Your Updated Medication List - Protect others around you: Learn how to safely use, store and throw away your medicines at www.disposemymeds.org.          This list is accurate as of 3/5/18 10:03 AM.  Always use your most recent med list.                   Brand Name Dispense Instructions for use Diagnosis    diclofenac 75 MG EC tablet    VOLTAREN     Take 75 mg by mouth        fexofenadine-pseudoePHEDrine  MG per 12 hr tablet    ALLEGRA-D    180 tablet    Take 1 tablet by mouth 2 times daily    Seasonal allergic rhinitis, unspecified allergic rhinitis trigger       fluticasone 50 MCG/ACT spray    FLONASE    48 g    Spray 1-2 sprays into both nostrils daily    Seasonal allergic rhinitis, unspecified allergic rhinitis trigger       HYDROcodone-acetaminophen 5-325 MG per tablet    NORCO    30 tablet    Take 1-2 tablets by mouth every 6 hours as needed for moderate to severe pain    Left shoulder pain, unspecified chronicity       ibuprofen 800 MG tablet    ADVIL/MOTRIN    90 tablet    Take 1 tablet (800 mg) by mouth 3 times daily as needed for pain    Left shoulder pain, unspecified chronicity       mefloquine 250 MG tablet    LARIAM    6 tablet    Take 1 tablet (250 mg) by mouth every 7 days Continue 4 weeks after return.    Travel advice encounter       misoprostol 200 MCG tablet    CYTOTEC          oxyCODONE-acetaminophen  MG per tablet    PERCOCET    40 tablet    Take 1 tablet by mouth every 4 hours as needed for pain    S/P arthroscopy of shoulder

## 2018-03-07 DIAGNOSIS — J30.2 SEASONAL ALLERGIC RHINITIS, UNSPECIFIED CHRONICITY, UNSPECIFIED TRIGGER: ICD-10-CM

## 2018-03-07 NOTE — TELEPHONE ENCOUNTER
"Requested Prescriptions   Pending Prescriptions Disp Refills     fluticasone (FLONASE) 50 MCG/ACT spray [Pharmacy Med Name: FLUTICASONE 50MCG SMOOTH SP (120SP) RX] 48 mL 0     Sig: SHAKE LIQUID AND USE 1 TO 2 SPRAYS IN EACH NOSTRIL DAILY    Inhaled Steroids Protocol Passed    3/7/2018 10:56 AM       Passed - Patient is age 12 or older       Passed - Recent (12 mo) or future (30 days) visit within the authorizing provider's specialty    Patient had office visit in the last year or has a visit in the next 30 days with authorizing provider.  See \"Patient Info\" tab in inbasket, or \"Choose Columns\" in Meds & Orders section of the refill encounter.             fluticasone (FLONASE) 50 MCG/ACT spray  Last Written Prescription Date:  2/14/17  Last Fill Quantity: 48g,  # refills: 1   Last office visit: 3/5/2018 with prescribing provider:  Dr. Petersen   Future Office Visit:      No flowsheet data found.      "

## 2018-03-08 NOTE — TELEPHONE ENCOUNTER
Routing refill request to provider for review/approval because:  G refill protocol allows RN to fill Inhaled Steroids for asthma or COPD diagnosis  Medication given for allergic rhinitis, routing to provider to review and fill, if appropriate.    Ayesha Owesn RN  [facility]

## 2018-03-09 RX ORDER — FLUTICASONE PROPIONATE 50 MCG
SPRAY, SUSPENSION (ML) NASAL
Qty: 48 ML | Refills: 2 | Status: SHIPPED | OUTPATIENT
Start: 2018-03-09 | End: 2018-12-31

## 2018-04-19 ENCOUNTER — THERAPY VISIT (OUTPATIENT)
Dept: PHYSICAL THERAPY | Facility: CLINIC | Age: 48
End: 2018-04-19
Payer: OTHER MISCELLANEOUS

## 2018-04-19 DIAGNOSIS — Z47.89 AFTERCARE FOLLOWING SURGERY OF THE MUSCULOSKELETAL SYSTEM: ICD-10-CM

## 2018-04-19 DIAGNOSIS — M25.512 ACUTE PAIN OF LEFT SHOULDER: ICD-10-CM

## 2018-04-19 DIAGNOSIS — M19.012 GLENOHUMERAL ARTHRITIS, LEFT: ICD-10-CM

## 2018-04-19 PROCEDURE — 97112 NEUROMUSCULAR REEDUCATION: CPT | Mod: GP | Performed by: PHYSICAL THERAPIST

## 2018-04-19 PROCEDURE — 97110 THERAPEUTIC EXERCISES: CPT | Mod: GP | Performed by: PHYSICAL THERAPIST

## 2018-04-19 NOTE — MR AVS SNAPSHOT
After Visit Summary   4/19/2018    Bj Philip    MRN: [id number]           Patient Information     Date Of Birth          1970        Visit Information        Provider Department      4/19/2018 10:50 AM Oneida Lopez, PT [facility]        Today's Diagnoses     Acute pain of left shoulder        Aftercare following surgery of the musculoskeletal system        Glenohumeral arthritis, left           Follow-ups after your visit        Your next 10 appointments already scheduled     May 07, 2018 12:00 PM CDT   DEMETRICE Extremity with Oneida Lopez PT   [facility] ([facility])    [address]  [facility] [address]   [phone]            May 21, 2018 10:40 AM CDT   DEMETRICE Extremity with Oneida Lopez PT   [facility] ([facility])    [address]  [facility] [address]   [phone]              Who to contact     If you have questions or need follow up information about today's clinic visit or your schedule please contact [facility] PHYSICAL THERAP[facility] directly at [phone].  Normal or non-critical lab and imaging results will be communicated to you by MyChart, letter or phone within 4 business days after the clinic has received the results. If you do not hear from us within 7 days, please contact the clinic through [employer] or phone. If you have a critical or abnormal lab result, we will notify you by phone as soon as possible.  Submit refill requests through [email or web address] or call your pharmacy and they will forward the refill request to us. Please allow 3 business days for your refill to be completed.          Additional Information About Your Visit        [employer] Information     [email or web address] gives you secure access to your electronic health record. If you see a primary care  provider, you can also send messages to your care team and make appointments. If you have questions, please call your primary care clinic.  If you do not have a primary care provider, please call [phone] and they will assist you.        Care EveryWhere ID     This is your Care EveryWhere ID. This could be used by other organizations to access your [facility] medical records  [id number]         Blood Pressure from Last 3 Encounters:   03/05/18 108/70   02/26/18 104/60   02/19/18 106/69    Weight from Last 3 Encounters:   03/05/18 104.6 kg (230 lb 8 oz)   02/26/18 103.9 kg (229 lb)   02/19/18 103.2 kg (227 lb 9.6 oz)              We Performed the Following     DEMETRICE PROGRESS NOTES REPORT     NEUROMUSCULAR RE-EDUCATION     THERAPEUTIC EXERCISES        Primary Care Provider Office Phone # Fax #    Acacia Washington Petersen [phone] [address] [facility] [address]        Equal Access to Services     [facility] : Hadii aad ku hadasho Soomaali, waaxda luqadaha, qaybta kaalmada adeegyada, waxay idiin hayaan adeeg kharashannan la'jessy . So [facility] [phone].    ATENCIÓN: Si habla español, tiene a alcaraz disposición servicios gratuitos de asistencia lingüística. Ll[facility] [phone].    We comply with applicable federal civil rights laws and [place] laws. We do not discriminate on the basis of race, color, national origin, age, disability, sex, sexual orientation, or gender identity.            Thank you!     Thank you for choosing [facility]  for your care. Our goal is always to provide you with excellent care. Hearing back from our patients is one way we can continue to improve our services. Please take a few minutes to complete the written survey that you may receive in the mail after your visit with us. Thank you!             Your Updated Medication List - Protect others around you: Learn how to safely use, store and throw away your  medicines at www.disposemymeds.org.          This list is accurate as of 4/19/18 11:29 AM.  Always use your most recent med list.                   Brand Name Dispense Instructions for use Diagnosis    diclofenac 75 MG EC tablet    VOLTAREN     Take 75 mg by mouth        fexofenadine-pseudoePHEDrine  MG per 12 hr tablet    ALLEGRA-D    180 tablet    Take 1 tablet by mouth 2 times daily    Seasonal allergic rhinitis, unspecified allergic rhinitis trigger       fluticasone 50 MCG/ACT spray    FLONASE    48 mL    SHAKE LIQUID AND USE 1 TO 2 SPRAYS IN EACH NOSTRIL DAILY    Seasonal allergic rhinitis, unspecified chronicity, unspecified trigger       HYDROcodone-acetaminophen 5-325 MG per tablet    NORCO    30 tablet    Take 1-2 tablets by mouth every 6 hours as needed for moderate to severe pain    Left shoulder pain, unspecified chronicity       ibuprofen 800 MG tablet    ADVIL/MOTRIN    90 tablet    Take 1 tablet (800 mg) by mouth 3 times daily as needed for pain    Left shoulder pain, unspecified chronicity       mefloquine 250 MG tablet    LARIAM    6 tablet    Take 1 tablet (250 mg) by mouth every 7 days Continue 4 weeks after return.    Travel advice encounter       misoprostol 200 MCG tablet    CYTOTEC          oxyCODONE-acetaminophen  MG per tablet    PERCOCET    40 tablet    Take 1 tablet by mouth every 4 hours as needed for pain    S/P arthroscopy of shoulder

## 2018-04-19 NOTE — PROGRESS NOTES
Subjective:  HPI                    Objective:  System    Physical Exam    General     ROS      PROGRESS  REPORT    Progress reporting period is from 1-18-18 to 4-18-18.       SUBJECTIVE  Subjective changes noted by patient:  Pt reports he is doing ok--has had a hiatus due to a fall (on 3-2-18), being sick, a death in the family and a vacation. He can reach at shoulder height to the front and to the side. He still feels a little pain reaching overhead but it's not as bad as it was. He went to see Dr. Branham on 3-27-18. He said he will still likely have a shoulder replacement and he can't guarantee it will be pain free. He was ok with where he was at but he felt he should have more ROM and strength at 4 months but he wasn't surprised due to the underlying condition of the shoulder. He still has a restriction for overhead work and no restriction shoulder height and below--just no repetitive reaching.          Current pain level is 0/10  .     Previous pain level was  3/10  .   Changes in function:  Yes (See Goal flowsheet attached for changes in current functional level)  Adverse reaction to treatment or activity: None    OBJECTIVE  Changes noted in objective findings:  Yes, L shoulder AROM: 157/158/T9/64        ASSESSMENT/PLAN  Updated problem list and treatment plan: Diagnosis 1:  S/p L extensive debridement of GH, SAD and RI release    Pain -  hot/cold therapy, manual therapy, self management, education, directional preference exercise and home program  Decreased ROM/flexibility - manual therapy and therapeutic exercise  Decreased joint mobility - manual therapy and therapeutic exercise  Decreased strength - therapeutic exercise and therapeutic activities  Impaired muscle performance - neuro re-education  Decreased function - therapeutic activities  STG/LTGs have been met or progress has been made towards goals:  Yes (See Goal flow sheet completed today.)  Assessment of Progress: The patient's condition has  potential to improve.  The patient has had set backs in their progress.  Patient is meeting short term goals and is progressing towards long term goals.  Self Management Plans:  Patient has been instructed in a home treatment program.  Patient is independent in a home treatment program.  Patient  has been instructed in self management of symptoms.  Patient is independent in self management of symptoms.  I have re-evaluated this patient and find that the nature, scope, duration and intensity of the therapy is appropriate for the medical condition of the patient.  Bj continues to require the following intervention to meet STG and LTG's:  PT    Recommendations:  This patient would benefit from continued therapy.     Frequency:  2 X a month, once daily  Duration:  for 2 months        Please refer to the daily flowsheet for treatment today, total treatment time and time spent performing 1:1 timed codes.

## 2018-05-07 ENCOUNTER — THERAPY VISIT (OUTPATIENT)
Dept: PHYSICAL THERAPY | Facility: CLINIC | Age: 48
End: 2018-05-07
Payer: OTHER MISCELLANEOUS

## 2018-05-07 DIAGNOSIS — M25.512 ACUTE PAIN OF LEFT SHOULDER: ICD-10-CM

## 2018-05-07 DIAGNOSIS — M19.012 GLENOHUMERAL ARTHRITIS, LEFT: ICD-10-CM

## 2018-05-07 DIAGNOSIS — Z47.89 AFTERCARE FOLLOWING SURGERY OF THE MUSCULOSKELETAL SYSTEM: ICD-10-CM

## 2018-05-07 PROCEDURE — 97110 THERAPEUTIC EXERCISES: CPT | Mod: GP | Performed by: PHYSICAL THERAPIST

## 2018-05-07 PROCEDURE — 97112 NEUROMUSCULAR REEDUCATION: CPT | Mod: GP | Performed by: PHYSICAL THERAPIST

## 2018-05-07 NOTE — MR AVS SNAPSHOT
After Visit Summary   5/7/2018    Bj Philip    MRN: [id number]           Patient Information     Date Of Birth          1970        Visit Information        Provider Department      5/7/2018 12:00 PM Oneida Lopez PT [facility] Physical Therapy        Today's Diagnoses     Acute pain of left shoulder        Aftercare following surgery of the musculoskeletal system        Glenohumeral arthritis, left           Follow-ups after your visit        Your next 10 appointments already scheduled     May 21, 2018 10:40 AM CDT   DEMETRICE Extremity with Oneida Lopez PT   [facility] Physical Therapy ([facility])    [address]  [facility] [address] [phone]              Who to contact     If you have questions or need follow up information about today's clinic visit or your schedule please contact [facility] directly at [phone].  Normal or non-critical lab and imaging results will be communicated to you by [employer], letter or phone within 4 business days after the clinic has received the results. If you do not hear from us within 7 days, please contact the clinic through DINKlifet or phone. If you have a critical or abnormal lab result, we will notify you by phone as soon as possible.  Submit refill requests through Pinchd or call your pharmacy and they will forward the refill request to us. Please allow 3 business days for your refill to be completed.          Additional Information About Your Visit        MyChart Information     Pinchd gives you secure access to your electronic health record. If you see a primary care provider, you can also send messages to your care team and make appointments. If you have questions, please call your primary care clinic.  If you do not have a primary care provider, please call [phone] and they will assist  you.        Care EveryWhere ID     This is your Care EveryWhere ID. This could be used by other organizations to access your [place] medical records  [id number]         Blood Pressure from Last 3 Encounters:   03/05/18 108/70   02/26/18 104/60   02/19/18 106/69    Weight from Last 3 Encounters:   03/05/18 104.6 kg (230 lb 8 oz)   02/26/18 103.9 kg (229 lb)   02/19/18 103.2 kg (227 lb 9.6 oz)              We Performed the Following     NEUROMUSCULAR RE-EDUCATION     THERAPEUTIC EXERCISES        Primary Care Provider Office Phone # Fax #    Acacia Washington Petersen [phone] [address] [facility] [address]        Equal Access to Services     LILLIE CHILDERS : Hadii aad ku hadasho Soomaali, waaxda luqadaha, qaybta kaalmada adeegyada, wes alba. So [facility] [phone].    ATENCIÓN: Si habla español, tiene a alcaraz disposición servicios gratuitos de asistencia lingüística. [facility] [phone].    We comply with applicable federal civil rights laws and [place] laws. We do not discriminate on the basis of race, color, national origin, age, disability, sex, sexual orientation, or gender identity.            Thank you!     Thank you for choosing [facility] PHYSICAL THERAPY  for your care. Our goal is always to provide you with excellent care. Hearing back from our patients is one way we can continue to improve our services. Please take a few minutes to complete the written survey that you may receive in the mail after your visit with us. Thank you!             Your Updated Medication List - Protect others around you: Learn how to safely use, store and throw away your medicines at www.disposemymeds.org.          This list is accurate as of 5/7/18 12:38 PM.  Always use your most recent med list.                   Brand Name Dispense Instructions for use Diagnosis    diclofenac 75 MG EC tablet    VOLTAREN     Take 75 mg by mouth         fexofenadine-pseudoePHEDrine  MG per 12 hr tablet    ALLEGRA-D    180 tablet    Take 1 tablet by mouth 2 times daily    Seasonal allergic rhinitis, unspecified allergic rhinitis trigger       fluticasone 50 MCG/ACT spray    FLONASE    48 mL    SHAKE LIQUID AND USE 1 TO 2 SPRAYS IN EACH NOSTRIL DAILY    Seasonal allergic rhinitis, unspecified chronicity, unspecified trigger       HYDROcodone-acetaminophen 5-325 MG per tablet    NORCO    30 tablet    Take 1-2 tablets by mouth every 6 hours as needed for moderate to severe pain    Left shoulder pain, unspecified chronicity       ibuprofen 800 MG tablet    ADVIL/MOTRIN    90 tablet    Take 1 tablet (800 mg) by mouth 3 times daily as needed for pain    Left shoulder pain, unspecified chronicity       mefloquine 250 MG tablet    LARIAM    6 tablet    Take 1 tablet (250 mg) by mouth every 7 days Continue 4 weeks after return.    Travel advice encounter       misoprostol 200 MCG tablet    CYTOTEC          oxyCODONE-acetaminophen  MG per tablet    PERCOCET    40 tablet    Take 1 tablet by mouth every 4 hours as needed for pain    S/P arthroscopy of shoulder

## 2018-05-07 NOTE — PROGRESS NOTES
"Subjective:  HPI                    Objective:  System    Physical Exam    General     ROS    Assessment/Plan:    SUBJECTIVE  Subjective changes as noted by pt:  Pt reports that just a few days ago he could hardly reach his arm overhead after doing some \"regular work\" that wasn't anything too out of the ordinary. He is feeling better now but it lasted for about 1-2 days and now the last 5 days or so have been better. He reports that he has been better about doing his HEP. He feels as though his shoulder is tolerating things ok at work for the most part.        Current pain level: 0/10     Changes in function:  Yes (See Goal flowsheet attached for changes in current functional level)     Adverse reaction to treatment or activity:  None    OBJECTIVE  Changes in objective findings:  Yes, L shoulder AROM after stretchin/157/T7/65        ASSESSMENT  Bj continues to require intervention to meet STG and LTG's: PT  Patient's symptoms are resolving.  Patient is progressing as expected.  Response to therapy has shown an improvement in  pain level and ROM   Progress made towards STG/LTG?  Yes (See Goal flowsheet attached for updates on achievement of STG and LTG)    PLAN  Current treatment program is being advanced to more complex exercises.    PTA/ATC plan:  N/A    Please refer to the daily flowsheet for treatment today, total treatment time and time spent performing 1:1 timed codes.          "

## 2018-05-21 ENCOUNTER — THERAPY VISIT (OUTPATIENT)
Dept: PHYSICAL THERAPY | Facility: CLINIC | Age: 48
End: 2018-05-21
Payer: OTHER MISCELLANEOUS

## 2018-05-21 DIAGNOSIS — M25.512 ACUTE PAIN OF LEFT SHOULDER: ICD-10-CM

## 2018-05-21 DIAGNOSIS — M19.012 GLENOHUMERAL ARTHRITIS, LEFT: ICD-10-CM

## 2018-05-21 DIAGNOSIS — Z47.89 AFTERCARE FOLLOWING SURGERY OF THE MUSCULOSKELETAL SYSTEM: ICD-10-CM

## 2018-05-21 PROCEDURE — 97112 NEUROMUSCULAR REEDUCATION: CPT | Mod: GP | Performed by: PHYSICAL THERAPIST

## 2018-05-21 PROCEDURE — 97110 THERAPEUTIC EXERCISES: CPT | Mod: GP | Performed by: PHYSICAL THERAPIST

## 2018-05-21 NOTE — MR AVS SNAPSHOT
After Visit Summary   5/21/2018    Bj Philip    MRN: [id number]           Patient Information     Date Of Birth          1970        Visit Information        Provider Department      5/21/2018 10:40 AM Oneida Lopez PT [facility] Physical Therapy        Today's Diagnoses     Acute pain of left shoulder        Aftercare following surgery of the musculoskeletal system        Glenohumeral arthritis, left           Follow-ups after your visit        Who to contact     If you have questions or need follow up information about today's clinic visit or your schedule please contact [facility] PHYSICAL THERAPY directly at [phone].  Normal or non-critical lab and imaging results will be communicated to you by MyChart, letter or phone within 4 business days after the clinic has received the results. If you do not hear from us within 7 days, please contact the clinic through [employer]hart or phone. If you have a critical or abnormal lab result, we will notify you by phone as soon as possible.  Submit refill requests through [email or web address] or call your pharmacy and they will forward the refill request to us. Please allow 3 business days for your refill to be completed.          Additional Information About Your Visit        MyChart Information     hurleypalmerflatt gives you secure access to your electronic health record. If you see a primary care provider, you can also send messages to your care team and make appointments. If you have questions, please call your primary care clinic.  If you do not have a primary care provider, please call [phone] and they will assist you.        Care EveryWhere ID     This is your Care EveryWhere ID. This could be used by other organizations to access your [place] medical records  [id number]         Blood Pressure from Last 3 Encounters:   03/05/18 108/70   02/26/18 104/60   02/19/18 106/69    Weight from Last  3 Encounters:   03/05/18 104.6 kg (230 lb 8 oz)   02/26/18 103.9 kg (229 lb)   02/19/18 103.2 kg (227 lb 9.6 oz)              We Performed the Following     DEMETRICE PROGRESS NOTES REPORT     NEUROMUSCULAR RE-EDUCATION     THERAPEUTIC EXERCISES        Primary Care Provider Office Phone # Fax #    Acacia Washington Petersen [phone] [address] [facility] [address]        Equal Access to Services     ANDRA CHILDERS : Hadii aad ku hadasho Soomaali, waaxda luqadaha, qaybta kaalmada adeegyada, waxay idiin hayaan adeeg kharash laelaine . So [facility] [phone].    ATENCIÓN: Si habla español, tiene a alcaraz disposición servicios gratuitos de asistencia lingüística. [facility] [phone].    We comply with applicable federal civil rights laws and [place] laws. We do not discriminate on the basis of race, color, national origin, age, disability, sex, sexual orientation, or gender identity.            Thank you!     Thank you for choosing [place] [facility] PHYSICAL THERAPY  for your care. Our goal is always to provide you with excellent care. Hearing back from our patients is one way we can continue to improve our services. Please take a few minutes to complete the written survey that you may receive in the mail after your visit with us. Thank you!             Your Updated Medication List - Protect others around you: Learn how to safely use, store and throw away your medicines at www.disposemymeds.org.          This list is accurate as of 5/21/18 11:28 AM.  Always use your most recent med list.                   Brand Name Dispense Instructions for use Diagnosis    diclofenac 75 MG EC tablet    VOLTAREN     Take 75 mg by mouth        fexofenadine-pseudoePHEDrine  MG per 12 hr tablet    ALLEGRA-D    180 tablet    Take 1 tablet by mouth 2 times daily    Seasonal allergic rhinitis, unspecified allergic rhinitis trigger       fluticasone 50 MCG/ACT spray    FLONASE    48 mL     SHAKE LIQUID AND USE 1 TO 2 SPRAYS IN EACH NOSTRIL DAILY    Seasonal allergic rhinitis, unspecified chronicity, unspecified trigger       HYDROcodone-acetaminophen 5-325 MG per tablet    NORCO    30 tablet    Take 1-2 tablets by mouth every 6 hours as needed for moderate to severe pain    Left shoulder pain, unspecified chronicity       ibuprofen 800 MG tablet    ADVIL/MOTRIN    90 tablet    Take 1 tablet (800 mg) by mouth 3 times daily as needed for pain    Left shoulder pain, unspecified chronicity       mefloquine 250 MG tablet    LARIAM    6 tablet    Take 1 tablet (250 mg) by mouth every 7 days Continue 4 weeks after return.    Travel advice encounter       misoprostol 200 MCG tablet    CYTOTEC          oxyCODONE-acetaminophen  MG per tablet    PERCOCET    40 tablet    Take 1 tablet by mouth every 4 hours as needed for pain    S/P arthroscopy of shoulder

## 2018-05-21 NOTE — LETTER
[facility] PHYSICAL THERAPY  [address] [facility]  [place] [id number] [phone]    May 21, 2018    Re: Bj Philip   :   1970  MRN:  [id number]   REFERRING PHYSICIAN:   Kyle Branham    [facility]    Date of Initial Evaluation:    Visits:  Rxs Used: 17  Reason for Referral:     Acute pain of left shoulder  Aftercare following surgery of the musculoskeletal system  Glenohumeral arthritis, left      DISCHARGE REPORT  Progress reporting period is from 17 to 18.       SUBJECTIVE  Subjective changes as noted by pt:  He reports he hasn't had a flare of his sx since being in therapy. He has been doing his HEP pretty consistently (doing sets of 20-30) 2-3 times. Overall he feels about 80-85% better. He can perform all ADLs without much if any limitations but he is not working out like he wants--hasn't done much in       Current pain level: 1/10     Changes in function:  Yes (See Goal flowsheet attached for changes in current functional level)     Adverse reaction to treatment or activity:  None    OBJECTIVE  Changes in objective findings:  Yes, L shoulder AROM: 162/160/T7/67  L shoulder strength:      ASSESSMENT/PLAN  Updated problem list and treatment plan: Diagnosis 1:  S/p L   Decreased strength - therapeutic exercise and therapeutic activities  Impaired muscle performance - neuro re-education  Decreased function - therapeutic activities  STG/LTGs have been met or progress has been made towards goals:  Yes (See Goal flow sheet completed today.)  Assessment of Progress: The patient's condition is improving.  The patient's condition has potential to improve.  The patient has met all of their long term goals.  Self Management Plans:  Patient has been instructed in a home treatment program.  Patient is independent in a home treatment program.  Patient  has been instructed in self management  of symptoms.  Patient is independent in self management of symptoms.  I have re-evaluated this patient and find that the nature, scope, duration and intensity of the therapy is appropriate for the medical condition of the patient.  Bj continues to require the following intervention to meet STG and LTG's:  PT intervention is no longer required to meet STG/LTG.    Recommendations:  This patient is ready to be discharged from therapy and continue their home treatment program.    Thank you for your referral.    INQUIRIES  Therapist: Oneida Black DPT   [place] [facility] PHYSICAL THERAPY  [address] [place] [address]. [address]  [facility] [address]  Phone: [phone]  Fax: [phone]

## 2018-05-21 NOTE — PROGRESS NOTES
Subjective:  HPI                    Objective:  System    Physical Exam    General     ROS    Assessment/Plan:      DISCHARGE REPORT    Progress reporting period is from 11-13-17 to 5-21-18.       SUBJECTIVE  Subjective changes as noted by pt:  He reports he hasn't had a flare of his sx since being in therapy. He has been doing his HEP pretty consistently (doing sets of 20-30) 2-3 times. Overall he feels about 80-85% better. He can perform all ADLs without much if any limitations but he is not working out like he wants--hasn't done much in        Current pain level: 1/10     Changes in function:  Yes (See Goal flowsheet attached for changes in current functional level)     Adverse reaction to treatment or activity:  None    OBJECTIVE  Changes in objective findings:  Yes, L shoulder AROM: 162/160/T7/67    L shoulder strength:      ASSESSMENT/PLAN  Updated problem list and treatment plan: Diagnosis 1:  S/p L   Decreased strength - therapeutic exercise and therapeutic activities  Impaired muscle performance - neuro re-education  Decreased function - therapeutic activities  STG/LTGs have been met or progress has been made towards goals:  Yes (See Goal flow sheet completed today.)  Assessment of Progress: The patient's condition is improving.  The patient's condition has potential to improve.  The patient has met all of their long term goals.  Self Management Plans:  Patient has been instructed in a home treatment program.  Patient is independent in a home treatment program.  Patient  has been instructed in self management of symptoms.  Patient is independent in self management of symptoms.  I have re-evaluated this patient and find that the nature, scope, duration and intensity of the therapy is appropriate for the medical condition of the patient.  Bj continues to require the following intervention to meet STG and LTG's:  PT intervention is no longer required to meet STG/LTG.    Recommendations:  This patient is  ready to be discharged from therapy and continue their home treatment program.    Please refer to the daily flowsheet for treatment today, total treatment time and time spent performing 1:1 timed codes.

## 2018-07-03 ENCOUNTER — OFFICE VISIT (OUTPATIENT)
Dept: FAMILY MEDICINE | Facility: CLINIC | Age: 48
End: 2018-07-03
Payer: COMMERCIAL

## 2018-07-03 VITALS
RESPIRATION RATE: 18 BRPM | TEMPERATURE: 99.3 F | WEIGHT: 228 LBS | BODY MASS INDEX: 29.26 KG/M2 | DIASTOLIC BLOOD PRESSURE: 70 MMHG | OXYGEN SATURATION: 99 % | HEART RATE: 73 BPM | SYSTOLIC BLOOD PRESSURE: 100 MMHG | HEIGHT: 74 IN

## 2018-07-03 DIAGNOSIS — R09.81 HEAD CONGESTION: ICD-10-CM

## 2018-07-03 DIAGNOSIS — R79.89 ABNORMAL CBC: ICD-10-CM

## 2018-07-03 DIAGNOSIS — R50.9 FEVER AND CHILLS: Primary | ICD-10-CM

## 2018-07-03 LAB
ALBUMIN SERPL-MCNC: 3.9 G/DL (ref 3.4–5)
ALP SERPL-CCNC: 45 U/L (ref 40–150)
ALT SERPL W P-5'-P-CCNC: 29 U/L (ref 0–70)
ANION GAP SERPL CALCULATED.3IONS-SCNC: 6 MMOL/L (ref 3–14)
AST SERPL W P-5'-P-CCNC: 18 U/L (ref 0–45)
BILIRUB SERPL-MCNC: 0.4 MG/DL (ref 0.2–1.3)
BUN SERPL-MCNC: 18 MG/DL (ref 7–30)
CALCIUM SERPL-MCNC: 9 MG/DL (ref 8.5–10.1)
CHLORIDE SERPL-SCNC: 102 MMOL/L (ref 94–109)
CO2 SERPL-SCNC: 30 MMOL/L (ref 20–32)
CREAT SERPL-MCNC: 1.28 MG/DL (ref 0.66–1.25)
ERYTHROCYTE [DISTWIDTH] IN BLOOD BY AUTOMATED COUNT: 13.9 % (ref 10–15)
GFR SERPL CREATININE-BSD FRML MDRD: 61 ML/MIN/1.7M2
GLUCOSE SERPL-MCNC: 97 MG/DL (ref 70–99)
HCT VFR BLD AUTO: 42.6 % (ref 40–53)
HGB BLD-MCNC: 14.6 G/DL (ref 13.3–17.7)
MCH RBC QN AUTO: 31.7 PG (ref 26.5–33)
MCHC RBC AUTO-ENTMCNC: 34.3 G/DL (ref 31.5–36.5)
MCV RBC AUTO: 92 FL (ref 78–100)
PLATELET # BLD AUTO: 196 10E9/L (ref 150–450)
POTASSIUM SERPL-SCNC: 4.4 MMOL/L (ref 3.4–5.3)
PROT SERPL-MCNC: 7.5 G/DL (ref 6.8–8.8)
RBC # BLD AUTO: 4.61 10E12/L (ref 4.4–5.9)
SODIUM SERPL-SCNC: 138 MMOL/L (ref 133–144)
WBC # BLD AUTO: 6.4 10E9/L (ref 4–11)

## 2018-07-03 PROCEDURE — 86618 LYME DISEASE ANTIBODY: CPT | Performed by: NURSE PRACTITIONER

## 2018-07-03 PROCEDURE — 85027 COMPLETE CBC AUTOMATED: CPT | Performed by: NURSE PRACTITIONER

## 2018-07-03 PROCEDURE — 99213 OFFICE O/P EST LOW 20 MIN: CPT | Performed by: NURSE PRACTITIONER

## 2018-07-03 PROCEDURE — 80053 COMPREHEN METABOLIC PANEL: CPT | Performed by: NURSE PRACTITIONER

## 2018-07-03 PROCEDURE — 85004 AUTOMATED DIFF WBC COUNT: CPT | Performed by: NURSE PRACTITIONER

## 2018-07-03 PROCEDURE — 36415 COLL VENOUS BLD VENIPUNCTURE: CPT | Performed by: NURSE PRACTITIONER

## 2018-07-03 ASSESSMENT — PAIN SCALES - GENERAL: PAINLEVEL: SEVERE PAIN (7)

## 2018-07-03 NOTE — PROGRESS NOTES
SUBJECTIVE:   Bj Philip is a 47 year old male who presents to clinic today for the following health issues:    Acute Illness   Acute illness concerns: fever/body aches  Onset: last friday    Fever: YES    Chills/Sweats: YES    Headache (location?): YES    Sinus Pressure:no    Conjunctivitis:  no    Ear Pain: no    Rhinorrhea: no    Congestion: YES    Sore Throat: no     Cough: no    Wheeze: no    Decreased Appetite: YES    Nausea: YES    Vomiting: no    Diarrhea:  no    Dysuria/Freq.: no    Fatigue/Achiness: yes    Sick/Strep Exposure: no     Therapies Tried and outcome: ibuprofen 800, advil but it only helps for the moment. Tried allegra D.    Feels sinus pressure, no ear pain. +headache, weak, fever/chills.   Bowel/bladder normal. Some body aches. Travel out of country in February to [place]. No new changes in medications or other travel. Has not been around anyone else who is sick      Problem list and histories reviewed & adjusted, as indicated.  Additional history: as documented    Patient Active Problem List   Diagnosis     CARDIOVASCULAR SCREENING; LDL GOAL LESS THAN 160     Seasonal allergies     Pain in joint, lower leg     Vitamin D deficiency     S/P arthroscopy of shoulder     Labral tear of shoulder, left, initial encounter     Glenohumeral arthritis, left     Erectile dysfunction, unspecified erectile dysfunction type     Acute pain of left shoulder     Aftercare following surgery of the musculoskeletal system     Past Surgical History:   Procedure Laterality Date     ARTHROPLASTY CARPOMETACARPAL (THUMB JOINT)      LT revision, Dr. Alok Rivera     ARTHROPLASTY CARPOMETACARPAL (THUMB JOINT), ARTHRODESIS, COMBINED  8/4/2011    Procedure:COMBINED ARTHROPLASTY CARPOMETACARPAL (THUMB JOINT), ARTHRODESIS; Metacarpalphalangeal Joint Fusion; Surgeon:ABNER GONZALEZ; [facility]     ARTHROSCOPY SHOULDER RT/LT  8/10    LT decompression of impingement     REPAIR GAMEKEEPER'S THUMB  8/4/2011     "Procedure:REPAIR LIGAMENT ULNAR COLLATERAL THUMB (GAMEKEEPER'S); Thumb Radial Reconstruction    ; Surgeon:ABNER GONZALEZ; Location[facility]       Social History   Substance Use Topics     Smoking status: Never Smoker     Smokeless tobacco: Never Used     Alcohol use No     Family History   Problem Relation Age of Onset     Anesthesia Reaction No family hx of      Blood Disease No family hx of      Hypertension No family hx of      Cerebrovascular Disease No family hx of      C.A.D. No family hx of      Diabetes No family hx of      Cancer No family hx of            Reviewed and updated as needed this visit by clinical staff       Reviewed and updated as needed this visit by Provider         ROS:  Constitutional, HEENT, cardiovascular, pulmonary, gi and gu systems are negative, except as otherwise noted.    OBJECTIVE:     /70 (BP Location: Right arm, Patient Position: Sitting, Cuff Size: Adult Large)  Pulse 73  Temp 99.3  F (37.4  C) (Oral)  Resp 18  Ht 1.88 m (6' 2\")  Wt 103.4 kg (228 lb)  SpO2 99%  BMI 29.27 kg/m2  Body mass index is 29.27 kg/(m^2).  GENERAL: tired appearing, alert and no distress  EYES: Eyes grossly normal to inspection, PERRL and conjunctivae and sclerae normal  HENT: ear canals and TM's normal, nose and mouth without ulcers or lesions  NECK: no adenopathy, no asymmetry, masses, or scars and thyroid normal to palpation  RESP: lungs clear to auscultation - no rales, rhonchi or wheezes  CV: regular rate and rhythm, normal S1 S2, no S3 or S4, no murmur, click or rub  ABDOMEN: soft, nontender, no hepatosplenomegaly, no masses and bowel sounds normal  MS: no gross musculoskeletal defects noted, no edema    Diagnostic Test Results:  Results for orders placed or performed in visit on 07/03/18 (from the past 24 hour(s))   CBC with platelets   Result Value Ref Range    WBC 6.4 4.0 - 11.0 10e9/L    RBC Count 4.61 4.4 - 5.9 10e12/L    Hemoglobin 14.6 13.3 - 17.7 g/dL    Hematocrit 42.6 40.0 - 53.0 " %    MCV 92 78 - 100 fl    MCH 31.7 26.5 - 33.0 pg    MCHC 34.3 31.5 - 36.5 g/dL    RDW 13.9 10.0 - 15.0 %    Platelet Count 196 150 - 450 10e9/L       ASSESSMENT/PLAN:         1. Fever and chills  Unsure cause. CBC stable. Likely viral infection. Go to ER if significantly worsening, or return to clinic within a week  - Lyme Disease Faith with reflex to WB Serum  - CBC with platelets  - Comprehensive metabolic panel (BMP + Alb, Alk Phos, ALT, AST, Total. Bili, TP)    2. Head congestion  As above.  - CBC with platelets    FUTURE APPOINTMENTS:       - Follow-up visit prn not improving    ARPIT Barlow, NP-C  [facility]

## 2018-07-03 NOTE — MR AVS SNAPSHOT
After Visit Summary   7/3/2018    Bj Philip    MRN: [id number]           Patient Information     Date Of Birth          1970        Visit Information        Provider Department      7/3/2018 2:20 PM Belkis Hayden NP [facility]        Today's Diagnoses     Fever and chills    -  1    Head congestion          Care Instructions    Continue tylenol/advil  Go to ER if things significantly worsen  I will send a [email or web address] message with the lab results          Follow-ups after your visit        Who to contact     If you have questions or need follow up information about today's clinic visit or your schedule please contact [facility] directly at [phone].  Normal or non-critical lab and imaging results will be communicated to you by ImaCorhart, letter or phone within 4 business days after the clinic has received the results. If you do not hear from us within 7 days, please contact the clinic through [email or web address] or phone. If you have a critical or abnormal lab result, we will notify you by phone as soon as possible.  Submit refill requests through [email or web address] or call your pharmacy and they will forward the refill request to us. Please allow 3 business days for your refill to be completed.          Additional Information About Your Visit        MyChart Information     [email or web address] gives you secure access to your electronic health record. If you see a primary care provider, you can also send messages to your care team and make appointments. If you have questions, please call your primary care clinic.  If you do not have a primary care provider, please call [phone] and they will assist you.        Care EveryWhere ID     This is your Care EveryWhere ID. This could be used by other organizations to access your [place] medical records  [id number]        Your Vitals Were     Pulse Temperature Respirations Height Pulse Oximetry BMI (Body Mass Index)    73 99.3  F (37.4  C) (Oral) 18  "1.88 m (6' 2\") 99% 29.27 kg/m2       Blood Pressure from Last 3 Encounters:   07/03/18 100/70   03/05/18 108/70   02/26/18 104/60    Weight from Last 3 Encounters:   07/03/18 103.4 kg (228 lb)   03/05/18 104.6 kg (230 lb 8 oz)   02/26/18 103.9 kg (229 lb)              We Performed the Following     CBC with platelets     Comprehensive metabolic panel (BMP + Alb, Alk Phos, ALT, AST, Total. Bili, TP)     Lyme Disease Faith with reflex to WB Serum          Today's Medication Changes          These changes are accurate as of 7/3/18  2:45 PM.  If you have any questions, ask your nurse or doctor.               Stop taking these medicines if you haven't already. Please contact your care team if you have questions.     diclofenac 75 MG EC tablet   Commonly known as:  VOLTAREN   Stopped by:  Belkis Hayden NP           HYDROcodone-acetaminophen 5-325 MG per tablet   Commonly known as:  NORCO   Stopped by:  Belkis Hayden NP           mefloquine 250 MG tablet   Commonly known as:  LARIAM   Stopped by:  Belksi Hayden NP           oxyCODONE-acetaminophen  MG per tablet   Commonly known as:  PERCOCET   Stopped by:  Belkis Hayden NP                    Primary Care Provider Office Phone # Fax #    Acacia Washington Petersen [phone] [address] [address]        Equal Access to Services     [facility] AH: Hadii aad ku hadasho Soomaali, waaxda luqadaha, qaybta kaalmada adeegyada, wes alba. So [facility] [phone].    ATENCIÓN: Si habla español, tiene a alcaraz disposición servicios gratuitos de asistencia lingüística. Llame al [phone].    We comply with applicable federal civil rights laws and [place] laws. We do not discriminate on the basis of race, color, national origin, age, disability, sex, sexual orientation, or gender identity.            Thank you!     Thank you for choosing [facility]  for your care. Our goal is always to provide you " with excellent care. Hearing back from our patients is one way we can continue to improve our services. Please take a few minutes to complete the written survey that you may receive in the mail after your visit with us. Thank you!             Your Updated Medication List - Protect others around you: Learn how to safely use, store and throw away your medicines at www.disposemymeds.org.          This list is accurate as of 7/3/18  2:45 PM.  Always use your most recent med list.                   Brand Name Dispense Instructions for use Diagnosis    fexofenadine-pseudoePHEDrine  MG per 12 hr tablet    ALLEGRA-D    180 tablet    Take 1 tablet by mouth 2 times daily    Seasonal allergic rhinitis, unspecified allergic rhinitis trigger       fluticasone 50 MCG/ACT spray    FLONASE    48 mL    SHAKE LIQUID AND USE 1 TO 2 SPRAYS IN EACH NOSTRIL DAILY    Seasonal allergic rhinitis, unspecified chronicity, unspecified trigger       ibuprofen 800 MG tablet    ADVIL/MOTRIN    90 tablet    Take 1 tablet (800 mg) by mouth 3 times daily as needed for pain    Left shoulder pain, unspecified chronicity       misoprostol 200 MCG tablet    CYTOTEC

## 2018-07-03 NOTE — PATIENT INSTRUCTIONS
Continue tylenol/advil  Go to ER if things significantly worsen  I will send a [employer] message with the lab results

## 2018-07-05 LAB
B BURGDOR IGG+IGM SER QL: 0.06 (ref 0–0.89)
DIFFERENTIAL METHOD BLD: NORMAL
EOSINOPHIL # BLD AUTO: 0.1 10E9/L (ref 0–0.7)
EOSINOPHIL NFR BLD AUTO: 1 %
LYMPHOCYTES # BLD AUTO: 3.3 10E9/L (ref 0.8–5.3)
LYMPHOCYTES NFR BLD AUTO: 53 %
MONOCYTES # BLD AUTO: 0.4 10E9/L (ref 0–1.3)
MONOCYTES NFR BLD AUTO: 6 %
NEUTROPHILS # BLD AUTO: 2.6 10E9/L (ref 1.6–8.3)
NEUTROPHILS NFR BLD AUTO: 40 %
PLATELET # BLD EST: NORMAL 10*3/UL
RBC MORPH BLD: NORMAL

## 2018-10-13 ENCOUNTER — MYC MEDICAL ADVICE (OUTPATIENT)
Dept: FAMILY MEDICINE | Facility: CLINIC | Age: 48
End: 2018-10-13

## 2018-10-16 ENCOUNTER — OFFICE VISIT (OUTPATIENT)
Dept: FAMILY MEDICINE | Facility: CLINIC | Age: 48
End: 2018-10-16
Payer: COMMERCIAL

## 2018-10-16 VITALS
OXYGEN SATURATION: 96 % | WEIGHT: 229 LBS | SYSTOLIC BLOOD PRESSURE: 104 MMHG | DIASTOLIC BLOOD PRESSURE: 64 MMHG | TEMPERATURE: 99.4 F | BODY MASS INDEX: 29.4 KG/M2 | HEART RATE: 84 BPM

## 2018-10-16 DIAGNOSIS — Z71.84 TRAVEL ADVICE ENCOUNTER: ICD-10-CM

## 2018-10-16 DIAGNOSIS — N52.9 ERECTILE DYSFUNCTION, UNSPECIFIED ERECTILE DYSFUNCTION TYPE: ICD-10-CM

## 2018-10-16 DIAGNOSIS — R25.2 LEG CRAMPS: Primary | ICD-10-CM

## 2018-10-16 PROCEDURE — 99214 OFFICE O/P EST MOD 30 MIN: CPT | Performed by: FAMILY MEDICINE

## 2018-10-16 RX ORDER — TADALAFIL 20 MG/1
20 TABLET ORAL DAILY PRN
Qty: 6 TABLET | Refills: 5 | Status: SHIPPED | OUTPATIENT
Start: 2018-10-16 | End: 2018-12-31

## 2018-10-16 RX ORDER — MEFLOQUINE HYDROCHLORIDE 250 MG/1
250 TABLET ORAL
Qty: 6 TABLET | Refills: 0 | Status: SHIPPED | OUTPATIENT
Start: 2018-10-16 | End: 2018-12-31

## 2018-10-16 RX ORDER — IBUPROFEN 800 MG/1
800 TABLET, FILM COATED ORAL EVERY 8 HOURS PRN
Qty: 90 TABLET | Refills: 2 | Status: SHIPPED | OUTPATIENT
Start: 2018-10-16 | End: 2018-12-31

## 2018-10-16 NOTE — PROGRESS NOTES
SUBJECTIVE:   Bj Philip is a 47 year old male who presents to clinic today for the following health issues:      Joint Pain    Onset: 1-2 months     Description:   Location: Both legs, feet and groin   Character: Cramping    Intensity: mild to severe    Progression of Symptoms: intermittent    Accompanying Signs & Symptoms:  Other symptoms: none    History:   Previous similar pain: yes but not as consistent as this    Precipitating factors:   Trauma or overuse: no    Alleviating factors:  Improved by: nothing    Therapies Tried and outcome: None    SUBJECTIVE:  Here today for some leg cramps as above.  Not sure exactly when this started up but he cannot think of any inciting event.  Much more noticeable at nighttime and he can have cramps in the backs of his thighs, calves, feet.  It seems to move around.  Denies any back pain or neurologic symptoms whatsoever.  Continues to work out and this does not seem to bother him.  His biggest concern is that he is traveling to [place] next week wants to make sure that nothing is wrong.  We have also tried some low-dose agents for erectile dysfunction these have not done much.  He is asking about something stronger and is willing to pay for it.    Review of systems otherwise negative.  Past medical, family, and social history reviewed and updated in chart.    OBJECTIVE:  /64 (BP Location: Right arm, Patient Position: Chair, Cuff Size: Adult Large)  Pulse 84  Temp 99.4  F (37.4  C) (Oral)  Wt 103.9 kg (229 lb)  SpO2 96%  BMI 29.4 kg/m2  Alert, pleasant, upbeat, and in no apparent discomfort.  S1 and S2 normal, no murmurs, clicks, gallops or rubs. Regular rate and rhythm. Chest is clear; no wheezes or rales. No edema or JVD.  Lower extremities -normal range of motion, muscle tone, deep tendon reflexes throughout.  Normal pedal pulses.  No swelling.    Past labs reviewed with the patient.     ASSESSMENT / PLAN:  (R25.2) Leg cramps  (primary encounter  diagnosis)  Comment: Not sure what to make of the cramps but I really do not think there is anything specifically pathologic.  Lab work recently has all been normal I do not know that there is much value in rechecking these items.  No reason to think of any electrolyte imbalance.  Suggested hydration and perhaps a multivitamin along with stretching before bed.  Plan: ibuprofen (ADVIL/MOTRIN) 800 MG tablet            (N52.9) Erectile dysfunction, unspecified erectile dysfunction type  Comment: Discussed mechanism of action of the proposed medication, as well as potential effects, both good and bad.  Patient expressed understanding and agreed with treatment.   Plan: tadalafil (CIALIS) 20 MG tablet            (Z71.89) Travel advice encounter  Comment: Discussed mechanism of action of the proposed medication, as well as potential effects, both good and bad.  Patient expressed understanding and agreed with treatment.   Plan: mefloquine (LARIAM) 250 MG tablet            Follow up 2 weeks  if not improving  S. Washington Petersen MD    (Chart documentation completed in part with Dragon voice-recognition software.  Even though reviewed some grammatical, spelling, and word errors may remain.)

## 2018-10-16 NOTE — MR AVS SNAPSHOT
After Visit Summary   10/16/2018    Bj Philip    MRN: [id number]           Patient Information     Date Of Birth          1970        Visit Information        Provider Department      10/16/2018 1:00 PM Acacia Petersen MD [facility]        Today's Diagnoses     Leg cramps    -  1    Erectile dysfunction, unspecified erectile dysfunction type        Travel advice encounter           Follow-ups after your visit        Follow-up notes from your care team     Return in about 2 weeks (around 10/30/2018) for If not improving as expected.      Who to contact     If you have questions or need follow up information about today's clinic visit or your schedule please contact [facility] directly at [phone].  Normal or non-critical lab and imaging results will be communicated to you by MyChart, letter or phone within 4 business days after the clinic has received the results. If you do not hear from us within 7 days, please contact the clinic through [employer] or phone. If you have a critical or abnormal lab result, we will notify you by phone as soon as possible.  Submit refill requests through [employer] or call your pharmacy and they will forward the refill request to us. Please allow 3 business days for your refill to be completed.          Additional Information About Your Visit        MyChart Information     [employer] gives you secure access to your electronic health record. If you see a primary care provider, you can also send messages to your care team and make appointments. If you have questions, please call your primary care clinic.  If you do not have a primary care provider, please call [phone] and they will assist you.        Care EveryWhere ID     This is your Care EveryWhere ID. This could be used by other organizations to access your [place] medical records  [id number]        Your Vitals Were     Pulse Temperature Pulse Oximetry BMI (Body Mass  Index)          84 99.4  F (37.4  C) (Oral) 96% 29.4 kg/m2         Blood Pressure from Last 3 Encounters:   10/16/18 104/64   07/03/18 100/70   03/05/18 108/70    Weight from Last 3 Encounters:   10/16/18 103.9 kg (229 lb)   07/03/18 103.4 kg (228 lb)   03/05/18 104.6 kg (230 lb 8 oz)              Today, you had the following     No orders found for display         Today's Medication Changes          These changes are accurate as of 10/16/18  3:49 PM.  If you have any questions, ask your nurse or doctor.               Start taking these medicines.        Dose/Directions    mefloquine 250 MG tablet   Commonly known as:  LARIAM   Used for:  Travel advice encounter   Started by:  Aaccia Petersen MD        Dose:  250 mg   Take 1 tablet (250 mg) by mouth every 7 days Continue 4 weeks after return.   Quantity:  6 tablet   Refills:  0       tadalafil 20 MG tablet   Commonly known as:  CIALIS   Used for:  Erectile dysfunction, unspecified erectile dysfunction type   Started by:  Acacia Petersen MD        Dose:  20 mg   Take 1 tablet (20 mg) by mouth daily as needed prior to sex.   Quantity:  6 tablet   Refills:  5         These medicines have changed or have updated prescriptions.        Dose/Directions    * ibuprofen 800 MG tablet   Commonly known as:  ADVIL/MOTRIN   This may have changed:  Another medication with the same name was added. Make sure you understand how and when to take each.   Used for:  Left shoulder pain, unspecified chronicity   Changed by:  Acacia Petersen MD        Dose:  800 mg   Take 1 tablet (800 mg) by mouth 3 times daily as needed for pain   Quantity:  90 tablet   Refills:  0       * ibuprofen 800 MG tablet   Commonly known as:  ADVIL/MOTRIN   This may have changed:  You were already taking a medication with the same name, and this prescription was added. Make sure you understand how and when to take each.   Used for:  Leg cramps   Changed by:  Acacia Petersen MD         Dose:  800 mg   Take 1 tablet (800 mg) by mouth every 8 hours as needed for moderate pain   Quantity:  90 tablet   Refills:  2       * Notice:  This list has 2 medication(s) that are the same as other medications prescribed for you. Read the directions carefully, and ask your doctor or other care provider to review them with you.         Where to get your medicines      These medications were sent to [id number] - [facility] [address] [facility]  [address] [address]    Hours:  24-hours Phone:  [phone]     ibuprofen 800 MG tablet    mefloquine 250 MG tablet    tadalafil 20 MG tablet                Primary Care Provider Office Phone # Fax #    Acacia Washington Petersen [phone] [address] [address]        Equal Access to Services     [facility] : Hadii aad ku hadasho Soomaali, waaxda luqadaha, qaybta kaalmada adeegyada, waxay idiin hayaan graeme kharashannan benavides . So [facility] [phone].    ATENCIÓN: Si habla español, tiene a alcaraz disposición servicios gratuitos de asistencia lingüística. Aashish al [phone].    We comply with applicable federal civil rights laws and [place] laws. We do not discriminate on the basis of race, color, national origin, age, disability, sex, sexual orientation, or gender identity.            Thank you!     Thank you for choosing [facility]  for your care. Our goal is always to provide you with excellent care. Hearing back from our patients is one way we can continue to improve our services. Please take a few minutes to complete the written survey that you may receive in the mail after your visit with us. Thank you!             Your Updated Medication List - Protect others around you: Learn how to safely use, store and throw away your medicines at www.disposemymeds.org.          This list is accurate as of 10/16/18  3:49 PM.  Always use your  most recent med list.                   Brand Name Dispense Instructions for use Diagnosis    fexofenadine-pseudoePHEDrine  MG per 12 hr tablet    ALLEGRA-D    180 tablet    Take 1 tablet by mouth 2 times daily    Seasonal allergic rhinitis, unspecified allergic rhinitis trigger       fluticasone 50 MCG/ACT spray    FLONASE    48 mL    SHAKE LIQUID AND USE 1 TO 2 SPRAYS IN EACH NOSTRIL DAILY    Seasonal allergic rhinitis, unspecified chronicity, unspecified trigger       * ibuprofen 800 MG tablet    ADVIL/MOTRIN    90 tablet    Take 1 tablet (800 mg) by mouth 3 times daily as needed for pain    Left shoulder pain, unspecified chronicity       * ibuprofen 800 MG tablet    ADVIL/MOTRIN    90 tablet    Take 1 tablet (800 mg) by mouth every 8 hours as needed for moderate pain    Leg cramps       mefloquine 250 MG tablet    LARIAM    6 tablet    Take 1 tablet (250 mg) by mouth every 7 days Continue 4 weeks after return.    Travel advice encounter       misoprostol 200 MCG tablet    CYTOTEC          tadalafil 20 MG tablet    CIALIS    6 tablet    Take 1 tablet (20 mg) by mouth daily as needed prior to sex.    Erectile dysfunction, unspecified erectile dysfunction type       * Notice:  This list has 2 medication(s) that are the same as other medications prescribed for you. Read the directions carefully, and ask your doctor or other care provider to review them with you.

## 2018-12-06 ENCOUNTER — OFFICE VISIT (OUTPATIENT)
Dept: FAMILY MEDICINE | Facility: CLINIC | Age: 48
End: 2018-12-06
Payer: COMMERCIAL

## 2018-12-06 VITALS
TEMPERATURE: 95.8 F | HEIGHT: 74 IN | DIASTOLIC BLOOD PRESSURE: 78 MMHG | HEART RATE: 80 BPM | WEIGHT: 226.2 LBS | SYSTOLIC BLOOD PRESSURE: 136 MMHG | OXYGEN SATURATION: 97 % | BODY MASS INDEX: 29.03 KG/M2

## 2018-12-06 DIAGNOSIS — J06.9 UPPER RESPIRATORY TRACT INFECTION, UNSPECIFIED TYPE: Primary | ICD-10-CM

## 2018-12-06 DIAGNOSIS — E66.3 OVERWEIGHT (BMI 25.0-29.9): ICD-10-CM

## 2018-12-06 DIAGNOSIS — Z28.21 INFLUENZA VACCINATION DECLINED BY PATIENT: ICD-10-CM

## 2018-12-06 PROCEDURE — 99213 OFFICE O/P EST LOW 20 MIN: CPT | Performed by: NURSE PRACTITIONER

## 2018-12-06 RX ORDER — BENZONATATE 200 MG/1
200 CAPSULE ORAL 3 TIMES DAILY PRN
Qty: 21 CAPSULE | Refills: 0 | Status: SHIPPED | OUTPATIENT
Start: 2018-12-06 | End: 2018-12-31

## 2018-12-06 NOTE — PATIENT INSTRUCTIONS
At [facility], we strive to deliver an exceptional experience to you, every time we see you.  If you receive a survey in the mail, please send us back your thoughts. We really do value your feedback.    Your care team:                            Family Medicine Internal Medicine   MD Bruno Huang MD Shantel Branch-Fleming, MD Katya Georgiev PA-C Megan Hill, APRN CNP    Ata Prajapati, MD Pediatrics   Janusz Lyle, ALE Villagomez, MD Lynnette Mccann APRN CNP   MD Angelina Vanegas MD Deborah Mielke, MD Jennifer Gates, APRN [facility]      Clinic hours: Monday - Thursday 7 am-7 pm; Fridays 7 am-5 pm.   Urgent care: Monday - Friday 11 am-9 pm; Saturday and Sunday 9 am-5 pm.  Pharmacy : Monday -Thursday 8 am-8 pm; Friday 8 am-6 pm; Saturday and Sunday 9 am-5 pm.     Clinic: [phone]   Pharmacy: [phone]        Viral Upper Respiratory Illness (Adult)  You have a viral upper respiratory illness (URI), which is another term for the common cold. This illness is contagious during the first few days. It is spread through the air by coughing and sneezing. It may also be spread by direct contact (touching the sick person and then touching your own eyes, nose, or mouth). Frequent handwashing will decrease risk of spread. Most viral illnesses go away within 7 to 10 days with rest and simple home remedies. Sometimes the illness may last for several weeks. Antibiotics will not kill a virus, and they are generally not prescribed for this condition.    Home care    If symptoms are severe, rest at home for the first 2 to 3 days. When you resume activity, don't let yourself get too tired.    Don't smoke. If you need help stopping, talk with your healthcare provider.    Avoid being exposed to cigarette smoke (yours or others ).    You may use acetaminophen or ibuprofen to control pain and fever, unless another medicine was prescribed. If you have chronic  liver or kidney disease, have ever had a stomach ulcer or gastrointestinal bleeding, or are taking blood-thinning medicines, talk with your healthcare provider before using these medicines. Aspirin should never be given to anyone under 18 years of age who is ill with a viral infection or fever. It may cause severe liver or brain damage.    Your appetite may be poor, so a light diet is fine. Stay well hydrated by drinking 6 to 8 glasses of fluids per day (water, soft drinks, juices, tea, or soup). Extra fluids will help loosen secretions in the nose and lungs.    Over-the-counter cold medicines will not shorten the length of time you re sick, but they may be helpful for the following symptoms: cough, sore throat, and nasal and sinus congestion. If you take prescription medicines, ask your healthcare provider or pharmacist which over-the-counter medicines are safe to use. (Note: Don't use decongestants if you have high blood pressure.)  Follow-up care  Follow up with your healthcare provider, or as advised.  When to seek medical advice  Call your healthcare provider right away if any of these occur:    Cough with lots of colored sputum (mucus)    Severe headache; face, neck, or ear pain    Difficulty swallowing due to throat pain    Fever of 100.4 F (38 C) or higher, or as directed by your healthcare provider  Call 911  Call 911 if any of these occur:    Chest pain, shortness of breath, wheezing, or difficulty breathing    Coughing up blood    Very severe pain with swallowing, especially if it goes along with a muffled voice   Date Last Reviewed: 6/1/2018 2000-2018 [employer]. [address] [place] [address], [place] [address]. All rights reserved. This information is not intended as a substitute for professional medical care. Always follow your healthcare professional's instructions.

## 2018-12-06 NOTE — MR AVS SNAPSHOT
After Visit Summary   12/6/2018    jB Philip    MRN: [id number]           Patient Information     Date Of Birth          1970        Visit Information        Provider Department      12/6/2018 3:00 PM Penelope Gates APRN CNP [facility]        Today's Diagnoses     Upper respiratory tract infection, unspecified type    -  1    Influenza vaccination declined by patient          Care Instructions    At [facility], we strive to deliver an exceptional experience to you, every time we see you.  If you receive a survey in the mail, please send us back your thoughts. We really do value your feedback.    Your care team:                            Family Medicine Internal Medicine   MD Bruno Huang, MD Shana Kapoor PA-C, APRN CNP Nam Ho, MD Pediatrics   Janusz Lyle, ALE Villagomez, MD Lynnette Mccann CNP, MD Bethany Templen, MD Deborah Mielke, MD Kim Thein, APRN CNP      Clinic hours: Monday - Thursday 7 am-7 pm; Fridays 7 am-5 pm.   Urgent care: Monday - Friday 11 am-9 pm; Saturday and Sunday 9 am-5 pm.  Pharmacy : Monday -Thursday 8 am-8 pm; Friday 8 am-6 pm; Saturday and Sunday 9 am-5 pm.     Clinic: [phone]   Pharmacy: [phone]        Viral Upper Respiratory Illness (Adult)  You have a viral upper respiratory illness (URI), which is another term for the common cold. This illness is contagious during the first few days. It is spread through the air by coughing and sneezing. It may also be spread by direct contact (touching the sick person and then touching your own eyes, nose, or mouth). Frequent handwashing will decrease risk of spread. Most viral illnesses go away within 7 to 10 days with rest and simple home remedies. Sometimes the illness may last for several weeks. Antibiotics will not kill a virus, and they are generally  not prescribed for this condition.    Home care    If symptoms are severe, rest at home for the first 2 to 3 days. When you resume activity, don't let yourself get too tired.    Don't smoke. If you need help stopping, talk with your healthcare provider.    Avoid being exposed to cigarette smoke (yours or others ).    You may use acetaminophen or ibuprofen to control pain and fever, unless another medicine was prescribed. If you have chronic liver or kidney disease, have ever had a stomach ulcer or gastrointestinal bleeding, or are taking blood-thinning medicines, talk with your healthcare provider before using these medicines. Aspirin should never be given to anyone under 18 years of age who is ill with a viral infection or fever. It may cause severe liver or brain damage.    Your appetite may be poor, so a light diet is fine. Stay well hydrated by drinking 6 to 8 glasses of fluids per day (water, soft drinks, juices, tea, or soup). Extra fluids will help loosen secretions in the nose and lungs.    Over-the-counter cold medicines will not shorten the length of time you re sick, but they may be helpful for the following symptoms: cough, sore throat, and nasal and sinus congestion. If you take prescription medicines, ask your healthcare provider or pharmacist which over-the-counter medicines are safe to use. (Note: Don't use decongestants if you have high blood pressure.)  Follow-up care  Follow up with your healthcare provider, or as advised.  When to seek medical advice  Call your healthcare provider right away if any of these occur:    Cough with lots of colored sputum (mucus)    Severe headache; face, neck, or ear pain    Difficulty swallowing due to throat pain    Fever of 100.4 F (38 C) or higher, or as directed by your healthcare provider  Call 911  Call 911 if any of these occur:    Chest pain, shortness of breath, wheezing, or difficulty breathing    Coughing up blood    Very severe pain with swallowing,  "especially if it goes along with a muffled voice   Date Last Reviewed: 6/1/2018 2000-2018 [employer]. [address] [place] [address], [place] [address]. All rights reserved. This information is not intended as a substitute for professional medical care. Always follow your healthcare professional's instructions.                Follow-ups after your visit        Follow-up notes from your care team     Return in about 5 days (around 12/11/2018) for Routine Visit.      Who to contact     If you have questions or need follow up information about today's clinic visit or your schedule please contact [facility] directly at [phone].  Normal or non-critical lab and imaging results will be communicated to you by MyChart, letter or phone within 4 business days after the clinic has received the results. If you do not hear from us within 7 days, please contact the clinic through [facility] or phone. If you have a critical or abnormal lab result, we will notify you by phone as soon as possible.  Submit refill requests through [employer] or call your pharmacy and they will forward the refill request to us. Please allow 3 business days for your refill to be completed.          Additional Information About Your Visit        MyChart Information     [employer] gives you secure access to your electronic health record. If you see a primary care provider, you can also send messages to your care team and make appointments. If you have questions, please call your primary care clinic.  If you do not have a primary care provider, please call [phone] and they will assist you.        Care EveryWhere ID     This is your Care EveryWhere ID. This could be used by other organizations to access your [facility] medical records  [id number]        Your Vitals Were     Pulse Temperature Height Pulse Oximetry BMI (Body Mass Index)       80 95.8  F (35.4  C) (Oral) 6' 2\" (1.88 m) 97% 29.04 kg/m2        Blood Pressure from " Last 3 Encounters:   12/06/18 136/78   10/16/18 104/64   07/03/18 100/70    Weight from Last 3 Encounters:   12/06/18 226 lb 3.2 oz (102.6 kg)   10/16/18 229 lb (103.9 kg)   07/03/18 228 lb (103.4 kg)              Today, you had the following     No orders found for display         Today's Medication Changes          These changes are accurate as of 12/6/18  3:18 PM.  If you have any questions, ask your nurse or doctor.               Start taking these medicines.        Dose/Directions    benzonatate 200 MG capsule   Commonly known as:  TESSALON   Used for:  Upper respiratory tract infection, unspecified type   Started by:  Penelope Gates APRN CNP        Dose:  200 mg   Take 1 capsule (200 mg) by mouth 3 times daily as needed for cough   Quantity:  21 capsule   Refills:  0            Where to get your medicines      These medications were sent to [facility] [address] [place] [address] [address] [place] [address]  [address] [facility] [address]     Phone:  [phone]     benzonatate 200 MG capsule                Primary Care Provider Office Phone # Fax #    Acacia Washington Petersen [phone] [address] [facility] [address]        Equal Access to Services     LILLIE CHILDERS : Hadii paolo luis hadasho Soomaali, waaxda luqadaha, qaybta kaalmada adeegyada, wes alba. So [facility] [phone].    ATENCIÓN: Si habla español, tiene a alcaraz disposición servicios gratuitos de asistencia lingüística. Aashish al [phone].    We comply with applicable federal civil rights laws and [place] laws. We do not discriminate on the basis of race, color, national origin, age, disability, sex, sexual orientation, or gender identity.            Thank you!     Thank you for choosing [facility]  for your care. Our goal is always to provide you with excellent care. Hearing back from our patients is one way we can  continue to improve our services. Please take a few minutes to complete the written survey that you may receive in the mail after your visit with us. Thank you!             Your Updated Medication List - Protect others around you: Learn how to safely use, store and throw away your medicines at www.disposemymeds.org.          This list is accurate as of 12/6/18  3:18 PM.  Always use your most recent med list.                   Brand Name Dispense Instructions for use Diagnosis    benzonatate 200 MG capsule    TESSALON    21 capsule    Take 1 capsule (200 mg) by mouth 3 times daily as needed for cough    Upper respiratory tract infection, unspecified type       fexofenadine-pseudoePHEDrine  MG 12 hr tablet    ALLEGRA-D    180 tablet    Take 1 tablet by mouth 2 times daily    Seasonal allergic rhinitis, unspecified allergic rhinitis trigger       fluticasone 50 MCG/ACT nasal spray    FLONASE    48 mL    SHAKE LIQUID AND USE 1 TO 2 SPRAYS IN EACH NOSTRIL DAILY    Seasonal allergic rhinitis, unspecified chronicity, unspecified trigger       * ibuprofen 800 MG tablet    ADVIL/MOTRIN    90 tablet    Take 1 tablet (800 mg) by mouth 3 times daily as needed for pain    Left shoulder pain, unspecified chronicity       * ibuprofen 800 MG tablet    ADVIL/MOTRIN    90 tablet    Take 1 tablet (800 mg) by mouth every 8 hours as needed for moderate pain    Leg cramps       mefloquine 250 MG tablet    LARIAM    6 tablet    Take 1 tablet (250 mg) by mouth every 7 days Continue 4 weeks after return.    Travel advice encounter       misoprostol 200 MCG tablet    CYTOTEC          tadalafil 20 MG tablet    CIALIS    6 tablet    Take 1 tablet (20 mg) by mouth daily as needed prior to sex.    Erectile dysfunction, unspecified erectile dysfunction type       * Notice:  This list has 2 medication(s) that are the same as other medications prescribed for you. Read the directions carefully, and ask your doctor or other care provider to  review them with you.

## 2018-12-06 NOTE — PROGRESS NOTES
SUBJECTIVE:   Bj Philip is a 48 year old male who presents to clinic today for the following health issues:    RESPIRATORY SYMPTOMS      Duration: one week    Description  Cough-nonproductive, fever (subjective), chills, headache and nausea    Severity: moderate to severe    Accompanying signs and symptoms: None    History (predisposing factors):  none    Precipitating or alleviating factors: None    Therapies tried and outcome:  Tylenol ibuprofen and theraflu  No ill contacts, no recent travel.  No ear pain but has sore throat and chest pain from coughing, no wheezing, shortness of breath, palpitations, dizziness.    Problem list and histories reviewed & adjusted, as indicated.  Additional history: as documented    Patient Active Problem List   Diagnosis     CARDIOVASCULAR SCREENING; LDL GOAL LESS THAN 160     Seasonal allergies     Pain in joint, lower leg     Vitamin D deficiency     S/P arthroscopy of shoulder     Labral tear of shoulder, left, initial encounter     Glenohumeral arthritis, left     Erectile dysfunction, unspecified erectile dysfunction type     Acute pain of left shoulder     Aftercare following surgery of the musculoskeletal system     Past Surgical History:   Procedure Laterality Date     ARTHROPLASTY CARPOMETACARPAL (THUMB JOINT)      LT revision, Dr. Alok Rivera     ARTHROPLASTY CARPOMETACARPAL (THUMB JOINT), ARTHRODESIS, COMBINED  8/4/2011    Procedure:COMBINED ARTHROPLASTY CARPOMETACARPAL (THUMB JOINT), ARTHRODESIS; Metacarpalphalangeal Joint Fusion; Surgeon:ABNER GONZALEZ; [facility]     ARTHROSCOPY SHOULDER RT/LT  8/10    LT decompression of impingement     REPAIR GAMEKEEPER'S THUMB  8/4/2011    Procedure:REPAIR LIGAMENT ULNAR COLLATERAL THUMB (GAMEKEEPER'S); Thumb Radial Reconstruction    ; Surgeon:ABNER GONZALEZ; [facility]       Social History   Substance Use Topics     Smoking status: Never Smoker     Smokeless tobacco: Never Used     Alcohol use No     Family  History   Problem Relation Age of Onset     Anesthesia Reaction No family hx of      Blood Disease No family hx of      Hypertension No family hx of      Cerebrovascular Disease No family hx of      C.A.D. No family hx of      Diabetes No family hx of      Cancer No family hx of          Current Outpatient Prescriptions   Medication Sig Dispense Refill     fexofenadine-pseudoePHEDrine (ALLEGRA-D)  MG per 12 hr tablet Take 1 tablet by mouth 2 times daily (Patient not taking: Reported on 12/6/2018) 180 tablet 3     fluticasone (FLONASE) 50 MCG/ACT spray SHAKE LIQUID AND USE 1 TO 2 SPRAYS IN EACH NOSTRIL DAILY (Patient not taking: Reported on 12/6/2018) 48 mL 2     ibuprofen (ADVIL/MOTRIN) 800 MG tablet Take 1 tablet (800 mg) by mouth every 8 hours as needed for moderate pain (Patient not taking: Reported on 12/6/2018) 90 tablet 2     ibuprofen (ADVIL/MOTRIN) 800 MG tablet Take 1 tablet (800 mg) by mouth 3 times daily as needed for pain (Patient not taking: Reported on 12/6/2018) 90 tablet 0     mefloquine (LARIAM) 250 MG tablet Take 1 tablet (250 mg) by mouth every 7 days Continue 4 weeks after return. (Patient not taking: Reported on 12/6/2018) 6 tablet 0     misoprostol (CYTOTEC) 200 MCG tablet        tadalafil (CIALIS) 20 MG tablet Take 1 tablet (20 mg) by mouth daily as needed prior to sex. (Patient not taking: Reported on 12/6/2018) 6 tablet 5     BP Readings from Last 3 Encounters:   12/06/18 136/78   10/16/18 104/64   07/03/18 100/70    Wt Readings from Last 3 Encounters:   12/06/18 226 lb 3.2 oz (102.6 kg)   10/16/18 229 lb (103.9 kg)   07/03/18 228 lb (103.4 kg)                    Reviewed and updated as needed this visit by clinical staff  Tobacco  Allergies  Meds  Med Hx  Surg Hx  Fam Hx  Soc Hx      Reviewed and updated as needed this visit by Provider         ROS:  Constitutional, HEENT, cardiovascular, pulmonary, gi and gu systems are negative, except as otherwise noted.    OBJECTIVE:     BP  "136/78  Pulse 80  Temp 95.8  F (35.4  C) (Oral)  Ht 6' 2\" (1.88 m)  Wt 226 lb 3.2 oz (102.6 kg)  SpO2 97%  BMI 29.04 kg/m2  Body mass index is 29.04 kg/(m^2).  GENERAL: healthy, alert and no distress  EYES: Eyes grossly normal to inspection, PERRL and conjunctivae and sclerae normal  HENT: ear canals and TM's normal, nose and mouth without ulcers or lesions  NECK: no adenopathy, no asymmetry, masses, or scars and thyroid normal to palpation  RESP: lungs clear to auscultation - no rales, rhonchi or wheezes  CV: regular rate and rhythm, normal S1 S2, no S3 or S4, no murmur, click or rub, no peripheral edema and peripheral pulses strong  ABDOMEN: soft, nontender, no hepatosplenomegaly, no masses and bowel sounds normal  MS: no gross musculoskeletal defects noted, no edema  SKIN: no suspicious lesions or rashes  NEURO: Normal strength and tone, mentation intact and speech normal  PSYCH: mentation appears normal, affect normal/bright  LYMPH: normal ant/post cervical, supraclavicular nodes    Diagnostic Test Results:  none     ASSESSMENT/PLAN:         BP Screening:   Last 3 BP Readings:    BP Readings from Last 3 Encounters:   12/06/18 136/78   10/16/18 104/64   07/03/18 100/70       The following was recommended to the patient:  Re-screen BP within a year and recommended lifestyle modifications  BMI:   Estimated body mass index is 29.04 kg/m  as calculated from the following:    Height as of this encounter: 1.88 m (6' 2\").    Weight as of this encounter: 102.6 kg (226 lb 3.2 oz).   Weight management plan: Discussed healthy diet and exercise guidelines      1. Upper respiratory tract infection, unspecified type  push fluids, rest, symptomatic treatment as needed    - benzonatate (TESSALON) 200 MG capsule; Take 1 capsule (200 mg) by mouth 3 times daily as needed for cough  Dispense: 21 capsule; Refill: 0    2. Overweight (BMI 25.0-29.9)  Benefits of weight loss reviewed in detail, encouraged him to cut back on the " carbohydrates in the diet, consume more fruits and vegetables, drink plenty of water, avoid fruit juices, sodas, get 150 min moderate exercise/week.  Recheck weight in 6 months.    3. Influenza vaccination declined by patient  Conscientious objector.      See Patient Instructions    ARPIT Romero [facility]

## 2018-12-31 ENCOUNTER — ANCILLARY PROCEDURE (OUTPATIENT)
Dept: GENERAL RADIOLOGY | Facility: CLINIC | Age: 48
End: 2018-12-31
Attending: FAMILY MEDICINE
Payer: COMMERCIAL

## 2018-12-31 ENCOUNTER — OFFICE VISIT (OUTPATIENT)
Dept: FAMILY MEDICINE | Facility: CLINIC | Age: 48
End: 2018-12-31
Payer: COMMERCIAL

## 2018-12-31 VITALS
DIASTOLIC BLOOD PRESSURE: 68 MMHG | WEIGHT: 228 LBS | OXYGEN SATURATION: 100 % | HEIGHT: 74 IN | TEMPERATURE: 98.1 F | SYSTOLIC BLOOD PRESSURE: 112 MMHG | BODY MASS INDEX: 29.26 KG/M2 | HEART RATE: 70 BPM
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
## 2018-12-31 DIAGNOSIS — R10.31 RIGHT GROIN PAIN: ICD-10-CM

## 2018-12-31 DIAGNOSIS — S50.01XA CONTUSION OF RIGHT ELBOW, INITIAL ENCOUNTER: ICD-10-CM

## 2018-12-31 PROCEDURE — 99214 OFFICE O/P EST MOD 30 MIN: CPT | Performed by: FAMILY MEDICINE

## 2018-12-31 PROCEDURE — 73080 X-RAY EXAM OF ELBOW: CPT | Mod: RT

## 2018-12-31 ASSESSMENT — MIFFLIN-ST. JEOR: SCORE: 1983.95
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
## 2018-12-31 NOTE — LETTER
December 31, 2018        Bj Philip  [address]  [place] [address]          To whom it may concern:    RE: Bj Philip    Patient was seen and treated today at our clinic.  Off work 12/31 - 1/3/19.    Please contact me for questions or concerns.      Sincerely,        Acacia Petersen MD

## 2019-01-01 NOTE — RESULT ENCOUNTER NOTE
Well, they saw exactly what we did - the bone spur that seems to have a crack through it.  Again, this will simply take a few weeks to heal.  In the meantime protect it with some type of pad as needed for comfort.    JUAN F Petersen M.D.

## 2019-01-10 ENCOUNTER — OFFICE VISIT (OUTPATIENT)
Dept: ORTHOPEDICS | Facility: CLINIC | Age: 49
End: 2019-01-10
Payer: COMMERCIAL

## 2019-01-10 ENCOUNTER — ANCILLARY PROCEDURE (OUTPATIENT)
Dept: GENERAL RADIOLOGY | Facility: CLINIC | Age: 49
End: 2019-01-10
Payer: COMMERCIAL

## 2019-01-10 VITALS
DIASTOLIC BLOOD PRESSURE: 72 MMHG | HEIGHT: 74 IN | WEIGHT: 228 LBS | SYSTOLIC BLOOD PRESSURE: 100 MMHG | BODY MASS INDEX: 29.26 KG/M2

## 2019-01-10 DIAGNOSIS — M25.521 RIGHT ELBOW PAIN: ICD-10-CM

## 2019-01-10 DIAGNOSIS — S52.021A CLOSED FRACTURE OF RIGHT OLECRANON PROCESS, INITIAL ENCOUNTER: Primary | ICD-10-CM

## 2019-01-10 PROCEDURE — 73080 X-RAY EXAM OF ELBOW: CPT | Mod: RT

## 2019-01-10 PROCEDURE — 24670 CLTX ULNAR FX PROX W/O MNPJ: CPT | Mod: RT | Performed by: FAMILY MEDICINE

## 2019-01-10 PROCEDURE — 99204 OFFICE O/P NEW MOD 45 MIN: CPT | Mod: 57 | Performed by: FAMILY MEDICINE

## 2019-01-10 ASSESSMENT — MIFFLIN-ST. JEOR: SCORE: 1983.95
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
## 2019-01-10 NOTE — PATIENT INSTRUCTIONS
Wear sling for 2 weeks, follow-up 2 weeks Patient Education     Elbow Fracture    You have a break (fracture) of one or more bones of your elbow joint. This may be a small crack in the bone. Or it may be a major break, with the broken parts pushed out of position.  This fracture usually takes 4 to 12 weeks to heal, depending on the type. The first step in treatment is with a splint or cast. Severe fractures may need surgery to put the bone fragments back into place.  Home care  Follow these guidelines when caring for yourself at home:    Keep your arm elevated to reduce pain and swelling. When sitting or lying down keep your arm above the level of your heart. You can do this by placing your arm on a pillow that rests on your chest or on a pillow at your side. This is most important during the first 2 days (48 hours) after the injury.    Put an ice pack on the injured area. Do this for 20 minutes every 1 to 2 hours the first day. You can make an ice pack by wrapping a plastic bag of ice cubes in a thin towel. As the ice melts, be careful that the cast or splint doesn t get wet. You can place the ice pack inside the sling and directly over the splint or cast. Continue to use the ice pack 3 to 4 times a day for the next 2 days. Then use the ice pack as needed to ease pain and swelling.    Keep the splint or cast completely dry at all times. Bathe with your splint or cast out of the water. Protect it with a large plastic bag, rubber-banded at the top end. If a fiberglass splint or cast gets wet, you can dry it with a hair dryer.    You may use acetaminophen or ibuprofen to control pain, unless another pain medicine was prescribed. If you have chronic liver or kidney disease, talk with your healthcare provider before using these medicines. Also talk with your provider if you ve had a stomach ulcer or gastrointestinal bleeding.    Don t put creams or objects under the cast if you have itching.  Follow-up care  Follow up  with your healthcare provider in 1 week, or as advised. This is to make sure the bone is healing the way it should. If a splint was put on, it may be changed to a cast during your follow-up visit.  X-rays may be taken. You will be told of any new findings that may affect your care.  When to seek medical advice  Call your healthcare provider right away if any of these occur:    The cast or splint cracks    The plaster cast or splint becomes wet or soft    The fiberglass cast or splint stays wet for more than 24 hours    Tightness or pain under the cast or splint gets worse    Bad odor from the cast or wound fluid stains the cast    Fingers become swollen, cold, blue, numb, or tingly    You can t move your fingers    Skin around cast becomes red    Fever of 100.4 F (38 C) or higher, or as directed by your healthcare provider   Date Last Reviewed: 2/6/2017 2000-2018 [employer]. [address] [place] [address]. All rights reserved. This information is not intended as a substitute for professional medical care. Always follow your healthcare professional's instructions.

## 2019-01-24 ENCOUNTER — OFFICE VISIT (OUTPATIENT)
Dept: ORTHOPEDICS | Facility: CLINIC | Age: 49
End: 2019-01-24
Payer: COMMERCIAL

## 2019-01-24 ENCOUNTER — ANCILLARY PROCEDURE (OUTPATIENT)
Dept: GENERAL RADIOLOGY | Facility: CLINIC | Age: 49
End: 2019-01-24
Payer: COMMERCIAL

## 2019-01-24 VITALS
HEIGHT: 74 IN | WEIGHT: 228 LBS | BODY MASS INDEX: 29.26 KG/M2 | DIASTOLIC BLOOD PRESSURE: 82 MMHG | SYSTOLIC BLOOD PRESSURE: 120 MMHG

## 2019-01-24 DIAGNOSIS — S52.021A CLOSED FRACTURE OF RIGHT OLECRANON PROCESS, INITIAL ENCOUNTER: Primary | ICD-10-CM

## 2019-01-24 DIAGNOSIS — S52.021A CLOSED FRACTURE OF RIGHT OLECRANON PROCESS, INITIAL ENCOUNTER: ICD-10-CM

## 2019-01-24 PROCEDURE — 99207 ZZC FRACTURE CARE IN GLOBAL PERIOD: CPT | Performed by: FAMILY MEDICINE

## 2019-01-24 PROCEDURE — 73080 X-RAY EXAM OF ELBOW: CPT | Mod: RT

## 2019-01-24 ASSESSMENT — MIFFLIN-ST. JEOR: SCORE: 1983.95
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
## 2019-01-31 ENCOUNTER — ANCILLARY PROCEDURE (OUTPATIENT)
Dept: GENERAL RADIOLOGY | Facility: CLINIC | Age: 49
End: 2019-01-31
Payer: COMMERCIAL

## 2019-01-31 ENCOUNTER — OFFICE VISIT (OUTPATIENT)
Dept: ORTHOPEDICS | Facility: CLINIC | Age: 49
End: 2019-01-31
Payer: COMMERCIAL

## 2019-01-31 VITALS
HEIGHT: 74 IN | WEIGHT: 228 LBS | DIASTOLIC BLOOD PRESSURE: 70 MMHG | BODY MASS INDEX: 29.26 KG/M2 | SYSTOLIC BLOOD PRESSURE: 118 MMHG

## 2019-01-31 DIAGNOSIS — M25.561 ACUTE PAIN OF RIGHT KNEE: ICD-10-CM

## 2019-01-31 DIAGNOSIS — S52.021A CLOSED FRACTURE OF RIGHT OLECRANON PROCESS, INITIAL ENCOUNTER: Primary | ICD-10-CM

## 2019-01-31 DIAGNOSIS — S52.021A CLOSED FRACTURE OF RIGHT OLECRANON PROCESS, INITIAL ENCOUNTER: ICD-10-CM

## 2019-01-31 PROCEDURE — 99207 ZZC FRACTURE CARE IN GLOBAL PERIOD: CPT | Performed by: FAMILY MEDICINE

## 2019-01-31 PROCEDURE — 73562 X-RAY EXAM OF KNEE 3: CPT | Mod: RT

## 2019-01-31 PROCEDURE — 73080 X-RAY EXAM OF ELBOW: CPT | Mod: RT

## 2019-01-31 ASSESSMENT — MIFFLIN-ST. JEOR: SCORE: 1983.95
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
## 2019-01-31 NOTE — LETTER
[facility]  [address]  [place]  [address]  [phone]          2019    Bj Philip  [address] [address]  [phone] (home) none (work)    :     1970      To Whom it May Concern:    This patient missed work 2019 due to right elbow injury.  He can return to full duties without restriction.      Please contact me for questions or concerns.    Sincerely,    Randolph Cox
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
## 2019-02-13 ENCOUNTER — THERAPY VISIT (OUTPATIENT)
Dept: PHYSICAL THERAPY | Facility: CLINIC | Age: 49
End: 2019-02-13
Payer: COMMERCIAL

## 2019-02-13 DIAGNOSIS — M25.561 ACUTE PAIN OF RIGHT KNEE: ICD-10-CM

## 2019-02-13 PROBLEM — M19.012 GLENOHUMERAL ARTHRITIS, LEFT: Status: RESOLVED | Noted: 2017-05-24 | Resolved: 2019-02-13

## 2019-02-13 PROBLEM — Z47.89 AFTERCARE FOLLOWING SURGERY OF THE MUSCULOSKELETAL SYSTEM: Status: RESOLVED | Noted: 2017-11-15 | Resolved: 2019-02-13

## 2019-02-13 PROBLEM — M25.512 ACUTE PAIN OF LEFT SHOULDER: Status: RESOLVED | Noted: 2017-11-15 | Resolved: 2019-02-13

## 2019-02-13 PROCEDURE — 97161 PT EVAL LOW COMPLEX 20 MIN: CPT | Mod: GP | Performed by: PHYSICAL THERAPIST

## 2019-02-13 PROCEDURE — 97110 THERAPEUTIC EXERCISES: CPT | Mod: GP | Performed by: PHYSICAL THERAPIST

## 2019-02-13 ASSESSMENT — ACTIVITIES OF DAILY LIVING (ADL)
HOW_WOULD_YOU_RATE_THE_OVERALL_FUNCTION_OF_YOUR_KNEE_DURING_YOUR_USUAL_DAILY_ACTIVITIES?: NEARLY NORMAL
LIMPING: I DO NOT HAVE THE SYMPTOM
WEAKNESS: I HAVE THE SYMPTOM BUT IT DOES NOT AFFECT MY ACTIVITY
GIVING WAY, BUCKLING OR SHIFTING OF KNEE: I DO NOT HAVE THE SYMPTOM
GO DOWN STAIRS: ACTIVITY IS SOMEWHAT DIFFICULT
PAIN: THE SYMPTOM AFFECTS MY ACTIVITY SLIGHTLY
RAW_SCORE: 58
SWELLING: I HAVE THE SYMPTOM BUT IT DOES NOT AFFECT MY ACTIVITY
SQUAT: ACTIVITY IS MINIMALLY DIFFICULT
STIFFNESS: I HAVE THE SYMPTOM BUT IT DOES NOT AFFECT MY ACTIVITY
GO UP STAIRS: ACTIVITY IS SOMEWHAT DIFFICULT
KNEEL ON THE FRONT OF YOUR KNEE: ACTIVITY IS MINIMALLY DIFFICULT
STAND: ACTIVITY IS NOT DIFFICULT
HOW_WOULD_YOU_RATE_THE_CURRENT_FUNCTION_OF_YOUR_KNEE_DURING_YOUR_USUAL_DAILY_ACTIVITIES_ON_A_SCALE_FROM_0_TO_100_WITH_100_BEING_YOUR_LEVEL_OF_KNEE_FUNCTION_PRIOR_TO_YOUR_INJURY_AND_0_BEING_THE_INABILITY_TO_PERFORM_ANY_OF_YOUR_USUAL_DAILY_ACTIVITIES?: 55
SIT WITH YOUR KNEE BENT: ACTIVITY IS NOT DIFFICULT
KNEE_ACTIVITY_OF_DAILY_LIVING_SUM: 58
WALK: ACTIVITY IS NOT DIFFICULT
AS_A_RESULT_OF_YOUR_KNEE_INJURY,_HOW_WOULD_YOU_RATE_YOUR_CURRENT_LEVEL_OF_DAILY_ACTIVITY?: NEARLY NORMAL
KNEE_ACTIVITY_OF_DAILY_LIVING_SCORE: 82.86
RISE FROM A CHAIR: ACTIVITY IS MINIMALLY DIFFICULT

## 2019-02-15 PROBLEM — M25.561 ACUTE PAIN OF RIGHT KNEE: Status: ACTIVE | Noted: 2019-02-15

## 2019-02-15 NOTE — PROGRESS NOTES
Patient is a 48 year old male with right side knee complaints.    Patient has the following significant findings with corresponding treatment plan.                Diagnosis 1:  R knee pain    Pain -  self management, education, directional preference exercise and home program  Decreased ROM/flexibility - manual therapy and therapeutic exercise  Decreased joint mobility - manual therapy and therapeutic exercise  Decreased strength - therapeutic exercise and therapeutic activities  Impaired muscle performance - neuro re-education  Decreased function - therapeutic activities    Therapy Evaluation Codes:   1) History comprised of:   Personal factors that impact the plan of care:      None.    Comorbidity factors that impact the plan of care are:      None.     Medications impacting care: Pain.  2) Examination of Body Systems comprised of:   Body structures and functions that impact the plan of care:      Knee.   Activity limitations that impact the plan of care are:      Running and Stairs.  3) Clinical presentation characteristics are:   Stable/Uncomplicated.  4) Decision-Making    Low complexity using standardized patient assessment instrument and/or measureable assessment of functional outcome.  Cumulative Therapy Evaluation is: Low complexity.    Previous and current functional limitations:  (See Goal Flow Sheet for this information)    Short term and Long term goals: (See Goal Flow Sheet for this information)     Communication ability:  Patient appears to be able to clearly communicate and understand verbal and written communication and follow directions correctly.  Patient has an  for communication clarity.  Treatment Explanation - The following has been discussed with the patient:   RX ordered/plan of care  Anticipated outcomes  Possible risks and side effects  This patient would benefit from PT intervention to resume normal activities.   Rehab potential is good.    Frequency:  1 X week, once  daily  Duration:  for 4 weeks then 2x/month for 1 month  Discharge Plan:  Achieve all LTG.  Independent in home treatment program.  Reach maximal therapeutic benefit.    Please refer to the daily flowsheet for treatment today, total treatment time and time spent performing 1:1 timed codes.
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
## 2019-02-21 ENCOUNTER — THERAPY VISIT (OUTPATIENT)
Dept: PHYSICAL THERAPY | Facility: CLINIC | Age: 49
End: 2019-02-21
Payer: COMMERCIAL

## 2019-02-21 DIAGNOSIS — M25.561 ACUTE PAIN OF RIGHT KNEE: ICD-10-CM

## 2019-02-21 PROCEDURE — 97112 NEUROMUSCULAR REEDUCATION: CPT | Mod: GP | Performed by: PHYSICAL THERAPIST

## 2019-02-21 PROCEDURE — 97110 THERAPEUTIC EXERCISES: CPT | Mod: GP | Performed by: PHYSICAL THERAPIST

## 2019-02-21 NOTE — PROGRESS NOTES
"Subjective:  HPI                    Objective:  System    Physical Exam    General     ROS    Assessment/Plan:    SUBJECTIVE  Subjective changes as noted by pt:  He reports that his knee pain is quite a bit better. He can now go up the stairs and it's a lot better but he still feels a little \"something\" but it's like a 1/10 vs a 4-5/10.       Current pain level: 0/10     Changes in function:  Yes (See Goal flowsheet attached for changes in current functional level)     Adverse reaction to treatment or activity:  None    OBJECTIVE  Changes in objective findings:  Yes, Pt reports a 1-2/10 with return from squatting pain.    R knee extension=rests at 0 and gets to 5 deg of hyperextension (L knee=-8)        ASSESSMENT  Abdullai continues to require intervention to meet STG and LTG's: PT  Patient's symptoms are resolving.  Patient is progressing as expected.  Response to therapy has shown an improvement in  pain level and ROM   Progress made towards STG/LTG?  Yes (See Goal flowsheet attached for updates on achievement of STG and LTG)    PLAN  Current treatment program is being advanced to more complex exercises.    PTA/ATC plan:  N/A    Please refer to the daily flowsheet for treatment today, total treatment time and time spent performing 1:1 timed codes.        "

## 2019-02-28 ENCOUNTER — OFFICE VISIT (OUTPATIENT)
Dept: ORTHOPEDICS | Facility: CLINIC | Age: 49
End: 2019-02-28
Payer: COMMERCIAL

## 2019-02-28 ENCOUNTER — ANCILLARY PROCEDURE (OUTPATIENT)
Dept: GENERAL RADIOLOGY | Facility: CLINIC | Age: 49
End: 2019-02-28
Attending: FAMILY MEDICINE
Payer: COMMERCIAL

## 2019-02-28 VITALS — SYSTOLIC BLOOD PRESSURE: 102 MMHG | DIASTOLIC BLOOD PRESSURE: 70 MMHG | BODY MASS INDEX: 29.27 KG/M2 | HEIGHT: 74 IN

## 2019-02-28 DIAGNOSIS — S52.021G CLOSED FRACTURE OF RIGHT OLECRANON PROCESS WITH DELAYED HEALING, SUBSEQUENT ENCOUNTER: ICD-10-CM

## 2019-02-28 DIAGNOSIS — S52.021G CLOSED FRACTURE OF RIGHT OLECRANON PROCESS WITH DELAYED HEALING, SUBSEQUENT ENCOUNTER: Primary | ICD-10-CM

## 2019-02-28 PROCEDURE — 73080 X-RAY EXAM OF ELBOW: CPT | Mod: RT

## 2019-02-28 PROCEDURE — 99207 ZZC FRACTURE CARE IN GLOBAL PERIOD: CPT | Performed by: FAMILY MEDICINE
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
## 2019-03-07 ENCOUNTER — THERAPY VISIT (OUTPATIENT)
Dept: PHYSICAL THERAPY | Facility: CLINIC | Age: 49
End: 2019-03-07
Payer: COMMERCIAL

## 2019-03-07 DIAGNOSIS — M25.561 ACUTE PAIN OF RIGHT KNEE: ICD-10-CM

## 2019-03-07 PROCEDURE — 97112 NEUROMUSCULAR REEDUCATION: CPT | Mod: GP | Performed by: PHYSICAL THERAPIST

## 2019-03-07 PROCEDURE — 97530 THERAPEUTIC ACTIVITIES: CPT | Mod: GP | Performed by: PHYSICAL THERAPIST

## 2019-03-07 PROCEDURE — 97110 THERAPEUTIC EXERCISES: CPT | Mod: GP | Performed by: PHYSICAL THERAPIST

## 2019-03-07 NOTE — PROGRESS NOTES
Subjective:                        Objective:  System    Physical Exam    General     ROS    Assessment/Plan:    SUBJECTIVE  Subjective changes as noted by pt:  Pt reports that he is 70% but he hasn't really tested it much yet.        Current pain level: 0/10     Changes in function:  Yes (See Goal flowsheet attached for changes in current functional level)     Adverse reaction to treatment or activity:  None    OBJECTIVE  Changes in objective findings:  Yes, pt reports pain 2/10 with walking lunge but no pain with squat today.         ASSESSMENT  Abdullai continues to require intervention to meet STG and LTG's: PT  Patient's symptoms are resolving.  Patient is progressing as expected.  Response to therapy has shown an improvement in  pain level, ROM , flexibility and strength  Progress made towards STG/LTG?  Yes (See Goal flowsheet attached for updates on achievement of STG and LTG)    PLAN  Current treatment program is being advanced to more complex exercises.    PTA/ATC plan:  N/A    Please refer to the daily flowsheet for treatment today, total treatment time and time spent performing 1:1 timed codes.

## 2019-03-13 ENCOUNTER — THERAPY VISIT (OUTPATIENT)
Dept: OCCUPATIONAL THERAPY | Facility: CLINIC | Age: 49
End: 2019-03-13
Payer: COMMERCIAL

## 2019-03-13 DIAGNOSIS — M25.521 RIGHT ELBOW PAIN: Primary | ICD-10-CM

## 2019-03-13 DIAGNOSIS — S52.021G CLOSED FRACTURE OF RIGHT OLECRANON PROCESS WITH DELAYED HEALING, SUBSEQUENT ENCOUNTER: ICD-10-CM

## 2019-03-13 PROCEDURE — 97165 OT EVAL LOW COMPLEX 30 MIN: CPT | Mod: GO | Performed by: OCCUPATIONAL THERAPIST

## 2019-03-13 PROCEDURE — 97110 THERAPEUTIC EXERCISES: CPT | Mod: GO | Performed by: OCCUPATIONAL THERAPIST

## 2019-03-13 PROCEDURE — 97140 MANUAL THERAPY 1/> REGIONS: CPT | Mod: GO | Performed by: OCCUPATIONAL THERAPIST
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
## 2019-03-25 ENCOUNTER — THERAPY VISIT (OUTPATIENT)
Dept: OCCUPATIONAL THERAPY | Facility: CLINIC | Age: 49
End: 2019-03-25
Payer: COMMERCIAL

## 2019-03-25 DIAGNOSIS — S52.021G CLOSED FRACTURE OF RIGHT OLECRANON PROCESS WITH DELAYED HEALING, SUBSEQUENT ENCOUNTER: ICD-10-CM

## 2019-03-25 DIAGNOSIS — M25.521 RIGHT ELBOW PAIN: ICD-10-CM

## 2019-03-25 PROCEDURE — 97110 THERAPEUTIC EXERCISES: CPT | Mod: GO | Performed by: OCCUPATIONAL THERAPIST

## 2019-03-25 PROCEDURE — 97760 ORTHOTIC MGMT&TRAING 1ST ENC: CPT | Mod: GO | Performed by: OCCUPATIONAL THERAPIST

## 2019-03-25 NOTE — PROGRESS NOTES
SOAP note objective information for 3/25/2019:    ROM:  Elbow  3/13/19 3/25/19   AROM(PROM) Left Right Right   Extension 0 -5 0   Flexion 135 125 130     Palpation:   3/13/19 3/25/19   Olecranon ++ ++   LEP - -   Extensors - -   MEP - -   Flexors - -   Cubital tunnel + slight -   Tricep - -     Pain Report:  VAS(0-10) 3/13/19 3/25/19   At Rest: 0/10    With Use: 7/10 4-5/10   Worst: 9/10 10/10   Location:  Posterior elbow    Home Exercise Program:  Warmth for stiffness  Ice for pain after activity  TFM to posterior elbow  AROM elbow extension with pronation and flexion with supination   PROM elbow flexion  Elbow E/F isometrics, progress to bicep/tricep isotonics  P/S isometrics  Elbow flexion block orthosis sleeping with elbow pad    Next Visit:  See in 1-2 weeks  Assess response to P/S isometrics, E/F isotonics and elbow flexion block orthosis   Progress to weight bearing as able  Defer US to posterior elbow due to increased swelling/possible bursitis   Recommend MD follow-up if continued swelling    Please refer to the daily flowsheet for treatment today, total treatment time and time spent performing 1:1 timed codes.

## 2019-04-01 ENCOUNTER — THERAPY VISIT (OUTPATIENT)
Dept: OCCUPATIONAL THERAPY | Facility: CLINIC | Age: 49
End: 2019-04-01
Payer: COMMERCIAL

## 2019-04-01 DIAGNOSIS — M25.521 RIGHT ELBOW PAIN: ICD-10-CM

## 2019-04-01 DIAGNOSIS — S52.021G CLOSED FRACTURE OF RIGHT OLECRANON PROCESS WITH DELAYED HEALING, SUBSEQUENT ENCOUNTER: ICD-10-CM

## 2019-04-01 PROCEDURE — 97112 NEUROMUSCULAR REEDUCATION: CPT | Mod: GO | Performed by: OCCUPATIONAL THERAPIST

## 2019-04-01 PROCEDURE — 97110 THERAPEUTIC EXERCISES: CPT | Mod: GO | Performed by: OCCUPATIONAL THERAPIST

## 2019-04-01 NOTE — PROGRESS NOTES
SOAP note objective information for 4/1/2019:      ROM:  Elbow  3/13/19 3/25/19 4/1/19   AROM(PROM) Left Right Right Right   Extension 0 -5 0 0   Flexion 135 125 130 130     Palpation:   3/13/19 3/25/19 4/1/19   Olecranon ++ ++ +   LEP - - -   Extensors - - -   MEP - - -   Flexors - - -   Cubital tunnel + slight - -   Tricep - - -     Pain Report:  VAS(0-10) 3/13/19 3/25/19 4/1/19   At Rest: 0/10     With Use: 7/10 4-5/10 2-3/10   Worst: 9/10 10/10 7-8/10   Location:  Posterior elbow    Home Exercise Program:  Warmth for stiffness  Ice for pain after activity  TFM to posterior elbow  AROM elbow extension with pronation and flexion with supination   PROM elbow flexion  Elbow E/F isometrics, progress to bicep/tricep isotonics  P/S isometrics  Weight bearing on table as tolerated, progress to wall and floor as able  Elbow flexion block orthosis sleeping with elbow pad    Next Visit:  See in 2 weeks  Assess response to weight bearing   Progress Report and UEFI  Discharge  to [facility] if doing well  Recommend MD f/u if concerns with swelling/bursa     Please refer to the daily flowsheet for treatment today, total treatment time and time spent performing 1:1 timed codes.

## 2019-04-03 ENCOUNTER — THERAPY VISIT (OUTPATIENT)
Dept: PHYSICAL THERAPY | Facility: CLINIC | Age: 49
End: 2019-04-03
Payer: COMMERCIAL

## 2019-04-03 DIAGNOSIS — M25.561 ACUTE PAIN OF RIGHT KNEE: ICD-10-CM

## 2019-04-03 PROCEDURE — 97530 THERAPEUTIC ACTIVITIES: CPT | Mod: GP | Performed by: PHYSICAL THERAPIST

## 2019-04-03 PROCEDURE — 97110 THERAPEUTIC EXERCISES: CPT | Mod: GP | Performed by: PHYSICAL THERAPIST

## 2019-04-03 PROCEDURE — 97112 NEUROMUSCULAR REEDUCATION: CPT | Mod: GP | Performed by: PHYSICAL THERAPIST

## 2019-04-03 ASSESSMENT — ACTIVITIES OF DAILY LIVING (ADL)
SWELLING: I DO NOT HAVE THE SYMPTOM
HOW_WOULD_YOU_RATE_THE_OVERALL_FUNCTION_OF_YOUR_KNEE_DURING_YOUR_USUAL_DAILY_ACTIVITIES?: NEARLY NORMAL
SIT WITH YOUR KNEE BENT: ACTIVITY IS NOT DIFFICULT
KNEEL ON THE FRONT OF YOUR KNEE: ACTIVITY IS NOT DIFFICULT
PAIN: I HAVE THE SYMPTOM BUT IT DOES NOT AFFECT MY ACTIVITY
LIMPING: I DO NOT HAVE THE SYMPTOM
KNEE_ACTIVITY_OF_DAILY_LIVING_SCORE: 91.43
SQUAT: ACTIVITY IS MINIMALLY DIFFICULT
AS_A_RESULT_OF_YOUR_KNEE_INJURY,_HOW_WOULD_YOU_RATE_YOUR_CURRENT_LEVEL_OF_DAILY_ACTIVITY?: NEARLY NORMAL
GO UP STAIRS: ACTIVITY IS MINIMALLY DIFFICULT
STIFFNESS: I DO NOT HAVE THE SYMPTOM
GO DOWN STAIRS: ACTIVITY IS MINIMALLY DIFFICULT
STAND: ACTIVITY IS NOT DIFFICULT
WEAKNESS: I HAVE THE SYMPTOM BUT IT DOES NOT AFFECT MY ACTIVITY
WALK: ACTIVITY IS NOT DIFFICULT
RISE FROM A CHAIR: ACTIVITY IS MINIMALLY DIFFICULT
GIVING WAY, BUCKLING OR SHIFTING OF KNEE: I DO NOT HAVE THE SYMPTOM
HOW_WOULD_YOU_RATE_THE_CURRENT_FUNCTION_OF_YOUR_KNEE_DURING_YOUR_USUAL_DAILY_ACTIVITIES_ON_A_SCALE_FROM_0_TO_100_WITH_100_BEING_YOUR_LEVEL_OF_KNEE_FUNCTION_PRIOR_TO_YOUR_INJURY_AND_0_BEING_THE_INABILITY_TO_PERFORM_ANY_OF_YOUR_USUAL_DAILY_ACTIVITIES?: 90
KNEE_ACTIVITY_OF_DAILY_LIVING_SUM: 64
RAW_SCORE: 64

## 2019-04-03 NOTE — PROGRESS NOTES
Subjective:  HPI       Knee Activity of Daily Living Score: 91.43            Objective:  System    Physical Exam    General     ROS    Assessment/Plan:    SUBJECTIVE  Subjective changes as noted by pt:  Overall doing well--no new complaints--he is about 90% better and in general he doesn't feel his knee much but it will still bother him once in a while when he is going up stairs. He has gotten back to doing the gym and has been biking and doing the elliptical.          Current pain level: 0/10     Changes in function:  Yes (See Goal flowsheet attached for changes in current functional level)     Adverse reaction to treatment or activity:  None    OBJECTIVE  Changes in objective findings:  Yes, knee extension stretch continues to alleviate his sx however he had only been doing it about once a day on average despite still having sx on average every other day.    Needs cuing for gluteal function with his step ups and lunges so there is simultaneous work vs quads or glutes only.       R knee hyperextension=3    ASSESSMENT  Abdullai continues to require intervention to meet STG and LTG's: PT  Patient's symptoms are resolving.  Patient is progressing as expected.  Response to therapy has shown an improvement in  pain level and flexibility  Progress made towards STG/LTG?  Yes (See Goal flowsheet attached for updates on achievement of STG and LTG)    PLAN  Current treatment program is being advanced to more complex exercises.    PTA/ATC plan:  N/A    Please refer to the daily flowsheet for treatment today, total treatment time and time spent performing 1:1 timed codes.

## 2019-04-29 ENCOUNTER — THERAPY VISIT (OUTPATIENT)
Dept: OCCUPATIONAL THERAPY | Facility: CLINIC | Age: 49
End: 2019-04-29
Payer: COMMERCIAL

## 2019-04-29 DIAGNOSIS — S52.021G CLOSED FRACTURE OF RIGHT OLECRANON PROCESS WITH DELAYED HEALING, SUBSEQUENT ENCOUNTER: ICD-10-CM

## 2019-04-29 DIAGNOSIS — M25.521 RIGHT ELBOW PAIN: ICD-10-CM

## 2019-04-29 PROCEDURE — 97110 THERAPEUTIC EXERCISES: CPT | Mod: GO | Performed by: OCCUPATIONAL THERAPIST

## 2019-04-29 PROCEDURE — 97112 NEUROMUSCULAR REEDUCATION: CPT | Mod: GO | Performed by: OCCUPATIONAL THERAPIST

## 2019-04-29 NOTE — PROGRESS NOTES
Hand Therapy Progress/Discharge Note    Current Date:  4/22/2019    Reporting period is 3/13/2019 to 4/29/2019    Diagnosis:   Right olecranon fracture/elbow pain  DOI:  12/19/18    Subjective:   Subjective changes noted by patient:  My elbow is feeling pretty good.  I still have a little pain when I sleep or put pressure on it.  Functional changes noted by patient:  Improvement in Sleep Patterns  Patient has noted adverse reaction to:  None    Functional Outcome Measure:  Upper Extremity Functional Index  SCORE:   Column Totals: 72/80  (A lower score indicates greater disability.)    Objective:  ROM:  Elbow  3/13/19 4/29/19   AROM(PROM) Left Right Right   Extension 0 -5 0   Flexion 135 125 130   Supination  WNL    Pronation  WNL      Strength:   (Measured in pounds)    3/13/19 4/29/19   Trials Left Right Right   1  2  3 120  110  98 96-  110-  108- 125-  140-  142-   Average: 109 105 136     Edema: Mild posterior elbow    Sensation:  WNL throughout all nerve distributions; per patient report    Resisted Testing:   3/13/19 4/29/19   Elbow Ext ++ + slight   Elbow Flex + slight -     Palpation:   3/13/19 4/29/19   Olecranon ++ + slight   LEP - -   Extensors - -   MEP - -   Flexors - -   Cubital tunnel + slight -   Tricep - -     Pain Report:  VAS(0-10) 3/13/19 4/29/19   At Rest: 0/10    With Use: 7/10 1-2/10   Worst: 9/10 5/10   Location:  Posterior elbow  Pain Quality:  Aching  Frequency: intermittent    Pain is worst:  Daytime and night time  Exacerbated by:  Bumping elbow   Relieved by:  rest  Progression:  Gradually improving    Please refer to the daily flowsheet for treatment provided today.     Assessment:  Response to therapy has been improvement to:  ROM of Elbow:  Ext  and Flex  Strength:     Edema:  Decreased posterior elbow  Pain:  intensity of pain is decreased    Overall Assessment:  Patient's symptoms are resolving and patient is independent in home exercise program.  STG/LTG:  STGoals have been  reviewed and progress or achievement has occurred;  see goal sheet for details and updates.  I have re-evaluated this patient and find that the nature, scope, duration and intensity of the therapy is appropriate for the medical condition of the patient.    Plan:  Frequency/Duration:  Discharge from Hand Therapy; continue home program.    Recommendations for Continued Therapy  Home Exercise Program:  Warmth for stiffness  Ice for pain after activity  TFM to posterior elbow  AROM elbow extension with pronation and flexion with supination   PROM elbow flexion  Elbow E/F isometrics, progress to bicep/tricep isotonics  P/S isometrics  Weight bearing on table as tolerated, progress to wall and floor as able  Elbow flexion block orthosis sleeping with elbow pad

## 2019-05-02 ENCOUNTER — THERAPY VISIT (OUTPATIENT)
Dept: PHYSICAL THERAPY | Facility: CLINIC | Age: 49
End: 2019-05-02
Payer: COMMERCIAL

## 2019-05-02 DIAGNOSIS — M25.561 ACUTE PAIN OF RIGHT KNEE: ICD-10-CM

## 2019-05-02 PROCEDURE — 97110 THERAPEUTIC EXERCISES: CPT | Mod: GP | Performed by: PHYSICAL THERAPIST

## 2019-05-02 PROCEDURE — 97112 NEUROMUSCULAR REEDUCATION: CPT | Mod: GP | Performed by: PHYSICAL THERAPIST

## 2019-05-02 NOTE — PROGRESS NOTES
Subjective:  HPI                    Objective:  System    Physical Exam    General     ROS    Assessment/Plan:    PROGRESS  REPORT    Progress reporting period is from 4-3-19 to 5-2-19.       SUBJECTIVE  Subjective changes noted by patient:  He is feeling a little less pain than he did a month ago but in general he now feels pretty good and reports he is 95% better--only getting very occasional pain with stairs.   .       Current pain level is 0/10  .     Previous pain level was  6/10  .   Changes in function:  Yes (See Goal flowsheet attached for changes in current functional level)  Adverse reaction to treatment or activity: None    OBJECTIVE  Changes noted in objective findings:  Yes, pt was able to demonstrate proper technique with 2-legged squatting without cuing and no valgus or hip drop with single leg squat or lunges        ASSESSMENT/PLAN  Updated problem list and treatment plan: Diagnosis 1:  R knee meniscal injury    Impaired muscle performance - neuro re-education  STG/LTGs have been met or progress has been made towards goals:  Yes (See Goal flow sheet completed today.)  Assessment of Progress: The patient's condition is improving.  The patient's condition has potential to improve.  The patient has met all of their long term goals.  Self Management Plans:  Patient has been instructed in a home treatment program.  Patient is independent in a home treatment program.  Patient  has been instructed in self management of symptoms.  Patient is independent in self management of symptoms.  I have re-evaluated this patient and find that the nature, scope, duration and intensity of the therapy is appropriate for the medical condition of the patient.  Bj continues to require the following intervention to meet STG and LTG's:  PT intervention is no longer required to meet STG/LTG.    Recommendations:  This patient is ready to be discharged from therapy and continue their home treatment program.    Please refer  to the daily flowsheet for treatment today, total treatment time and time spent performing 1:1 timed codes.

## 2019-05-21 ENCOUNTER — OFFICE VISIT (OUTPATIENT)
Dept: ORTHOPEDICS | Facility: CLINIC | Age: 49
End: 2019-05-21
Payer: COMMERCIAL

## 2019-05-21 VITALS — SYSTOLIC BLOOD PRESSURE: 108 MMHG | HEIGHT: 74 IN | BODY MASS INDEX: 29.27 KG/M2 | DIASTOLIC BLOOD PRESSURE: 76 MMHG

## 2019-05-21 DIAGNOSIS — S52.021G CLOSED FRACTURE OF RIGHT OLECRANON PROCESS WITH DELAYED HEALING, SUBSEQUENT ENCOUNTER: Primary | ICD-10-CM

## 2019-05-21 PROCEDURE — 99214 OFFICE O/P EST MOD 30 MIN: CPT | Performed by: FAMILY MEDICINE

## 2019-05-21 NOTE — PATIENT INSTRUCTIONS
1) Schedule MRI  2) Follow-up after MRI    It was great seeing you again today!    Randolph Cox
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
## 2019-05-24 ENCOUNTER — ANCILLARY PROCEDURE (OUTPATIENT)
Dept: MRI IMAGING | Facility: CLINIC | Age: 49
End: 2019-05-24
Attending: FAMILY MEDICINE
Payer: COMMERCIAL

## 2019-05-24 DIAGNOSIS — S52.021G CLOSED FRACTURE OF RIGHT OLECRANON PROCESS WITH DELAYED HEALING, SUBSEQUENT ENCOUNTER: ICD-10-CM

## 2019-05-24 PROCEDURE — 73221 MRI JOINT UPR EXTREM W/O DYE: CPT | Mod: TC

## 2019-08-15 ENCOUNTER — OFFICE VISIT (OUTPATIENT)
Dept: URGENT CARE | Facility: URGENT CARE | Age: 49
End: 2019-08-15
Payer: COMMERCIAL

## 2019-08-15 VITALS
WEIGHT: 222.6 LBS | DIASTOLIC BLOOD PRESSURE: 84 MMHG | SYSTOLIC BLOOD PRESSURE: 124 MMHG | TEMPERATURE: 100.7 F | RESPIRATION RATE: 18 BRPM | BODY MASS INDEX: 28.58 KG/M2 | HEART RATE: 95 BPM | OXYGEN SATURATION: 98 %

## 2019-08-15 DIAGNOSIS — A08.4 VIRAL GASTROENTERITIS: Primary | ICD-10-CM

## 2019-08-15 PROCEDURE — 99213 OFFICE O/P EST LOW 20 MIN: CPT | Performed by: FAMILY MEDICINE

## 2019-08-15 RX ORDER — ONDANSETRON 4 MG/1
4 TABLET, FILM COATED ORAL EVERY 6 HOURS PRN
Qty: 10 TABLET | Refills: 0 | Status: SHIPPED | OUTPATIENT
Start: 2019-08-15 | End: 2019-08-18

## 2019-08-15 ASSESSMENT — ENCOUNTER SYMPTOMS
VOMITING: 1
LIGHT-HEADEDNESS: 0
WOUND: 0
DIZZINESS: 0
NAUSEA: 0
RHINORRHEA: 1
CHILLS: 1
DIARRHEA: 1
COUGH: 1
HEADACHES: 1
FEVER: 0
SORE THROAT: 0
BLOOD IN STOOL: 0
DIAPHORESIS: 0
SHORTNESS OF BREATH: 0

## 2019-08-15 ASSESSMENT — PAIN SCALES - GENERAL: PAINLEVEL: NO PAIN (0)

## 2019-08-15 NOTE — LETTER
[facility]  [address]  [facility] [address]  Phone: [phone]    August 15, 2019        Bj Philip  [address] [facility] [address]          To whom it may concern:    RE: Bj Philip    Patient was seen and treated today at our clinic and should stay home and rest today as not feeling well or possible food borne illness that started yesterday.  He may consider a return to work tomorrow 8/16/19 if feeling well. Follow-up if symptoms persist or worsen.          Sincerely,        Hamilton Harry MD
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
## 2019-09-11 PROBLEM — M25.561 ACUTE PAIN OF RIGHT KNEE: Status: RESOLVED | Noted: 2019-02-15 | Resolved: 2019-09-11

## 2019-10-03 ENCOUNTER — HEALTH MAINTENANCE LETTER (OUTPATIENT)
Age: 49
End: 2019-10-03

## 2019-11-21 ENCOUNTER — THERAPY VISIT (OUTPATIENT)
Dept: PHYSICAL THERAPY | Facility: CLINIC | Age: 49
End: 2019-11-21
Payer: OTHER MISCELLANEOUS

## 2019-11-21 DIAGNOSIS — S39.012A BACK STRAIN, INITIAL ENCOUNTER: ICD-10-CM

## 2019-11-21 PROCEDURE — 97161 PT EVAL LOW COMPLEX 20 MIN: CPT | Mod: GP | Performed by: PHYSICAL THERAPIST

## 2019-11-21 PROCEDURE — 97112 NEUROMUSCULAR REEDUCATION: CPT | Mod: GP | Performed by: PHYSICAL THERAPIST

## 2019-11-21 PROCEDURE — 97110 THERAPEUTIC EXERCISES: CPT | Mod: GP | Performed by: PHYSICAL THERAPIST

## 2019-11-21 NOTE — PROGRESS NOTES
[facility] Initial Evaluation  Subjective:  The history is provided by the patient. No  was used.   Bj Philip being seen for Back strain.   Problem began 11/12/2019. Where condition occurred: at work.Problem occurred: Working  and reported as 4/10 on pain scale. General health as reported by patient is excellent. Pertinent medical history includes:  Other. Other medical history details: L shoulder surgery (2x).  Medical allergies: none.  Surgeries include:  Orthopedic surgery. Other surgery history details: L shoulder surgery (2x).  Current medications:  Muscle relaxants.   Primary job tasks include:  Repetitive tasks.  Pain is described as aching and other (tightness) and is constant. Pain is the same all the time. Since onset symptoms are gradually improving. Imaging testing: none. Past treatment: none.    Patient is . Restrictions include:  Working in normal job with restrictions (20# lifting Bending kept at minimal level).    Barriers include:  None as reported by patient.  Red flags:  None as reported by patient.  Type of problem:  Lumbar and thoracic   Condition occurred with:  Lifting and twisting. This is a new condition   Problem details: DOI: 11/12/19;  Lifting and twisting at the same time at work (~70#)  Saw the MD at ER on DOI:  Saw occupational Health MD 11/19/19.    Patient stated he has a history of muscle spasm in the back before..   Patient reports pain:  Lumbar spine left, lower thoracic spine and lumbar spine right (L> R side). Radiates to:  No radiation. Associated symptoms:  Loss of motion/stiffness and other (cramping). Symptoms are exacerbated by bending, lifting, twisting, sitting and standing and relieved by muscle relaxants, rest and other (lying down).    Oswestry Score: 31.11 %                 Objective:  Standing Alignment:    Cervical/Thoracic:  Forward head and thoracic kyphosis decreased  Shoulder/UE:  Rounded shoulders  Lumbar:   Lordosis decr  Pelvic:  Normal  Hip:  Normal        Gait:    Gait Type:  Normal                    Lumbar/SI Evaluation  ROM:    AROM Lumbar:   Flexion:            Mid shin; pulling in back  Ext:                    Mod loss; pain   Side Bend:        Left:  Mid thigh    Right:  Mid thigh  Rotation:           Left:  Min loss; pulling in back    Right:  Min loss; pulling in back  Side Glide:        Left:     Right:           Lumbar Myotomes:  normal            Lumbar DTR's:  not assessed        Lumbar Dermtomes:  not assessed                Neural Tension/Mobility:      Left side:SLR; SLR w/DF or Slump  negative.     Right side:   SLR w/DF; Slump or SLR  negative.   Lumbar Palpation:    Tenderness present at Left:    Quadratus Lumborum and Erector Spinae  Tenderness present at Right: Quadratus Lumborum and Erector Spinae      Spinal Segmental Conclusions:     Level: Hypo noted at T10, T11, T12, L1, L2 and L3           Cervical/Thoracic Evaluation    AROM:  AROM Cervical:    Flexion:            Chin to chest; pulling between shoulders  Extension:       WNL  Rotation:         Left: WNL     Right: WNL  Side Bend:      Left: min loss     Right:  Min loss                Cervical Palpation:    Tenderness present at Left:    Rhomboids and Erector Spinae  Tenderness present at Right:    Rhomboids and Erector Spinae               Shoulder Evaluation:  ROM:  AROM:  not assessed                                  Strength:  not assessed                                                    Hip Evaluation  HIP AROM:  AROM:    Left Hip:     Normal    Right Hip:   Normal                                     General     ROS    Assessment/Plan:    Patient is a 49 year old male with thoracic and lumbar complaints.    Patient has the following significant findings with corresponding treatment plan.                Diagnosis 1:  Thoracic/Lumbar Strain  Pain -  hot/cold therapy, US and manual therapy  Decreased ROM/flexibility - manual therapy,  therapeutic exercise and therapeutic activity  Decreased joint mobility - manual therapy and therapeutic exercise  Impaired muscle performance - neuro re-education  Decreased function - therapeutic activities  Impaired posture - neuro re-education    Therapy Evaluation Codes:   1) History comprised of:   Personal factors that impact the plan of care:      None.    Comorbidity factors that impact the plan of care are:      None.     Medications impacting care: Muscle relaxant.  2) Examination of Body Systems comprised of:   Body structures and functions that impact the plan of care:      Lumbar spine and Thoracic Spine.   Activity limitations that impact the plan of care are:      Bending, Lifting, Sitting, Standing and Working and transitions.    3) Clinical presentation characteristics are:   Stable/Uncomplicated.  4) Decision-Making    Low complexity using standardized patient assessment instrument and/or measureable assessment of functional outcome.  Cumulative Therapy Evaluation is: Low complexity.    Previous and current functional limitations:  (See Goal Flow Sheet for this information)    Short term and Long term goals: (See Goal Flow Sheet for this information)     Communication ability:  Patient appears to be able to clearly communicate and understand verbal and written communication and follow directions correctly.  Treatment Explanation - The following has been discussed with the patient:   RX ordered/plan of care  Anticipated outcomes  Possible risks and side effects  This patient would benefit from PT intervention to resume normal activities.   Rehab potential is good.    Frequency:  1-2 X week, once daily  Duration:  for 4 visits  Discharge Plan:  Achieve all LTG.  Independent in home treatment program.  Reach maximal therapeutic benefit.    Please refer to the daily flowsheet for treatment today, total treatment time and time spent performing 1:1 timed codes.

## 2019-11-26 ENCOUNTER — THERAPY VISIT (OUTPATIENT)
Dept: PHYSICAL THERAPY | Facility: CLINIC | Age: 49
End: 2019-11-26
Payer: OTHER MISCELLANEOUS

## 2019-11-26 DIAGNOSIS — S39.012A BACK STRAIN, INITIAL ENCOUNTER: ICD-10-CM

## 2019-11-26 PROCEDURE — 97140 MANUAL THERAPY 1/> REGIONS: CPT | Mod: GP | Performed by: PHYSICAL THERAPIST

## 2019-11-26 PROCEDURE — 97110 THERAPEUTIC EXERCISES: CPT | Mod: GP | Performed by: PHYSICAL THERAPIST

## 2019-12-05 ENCOUNTER — THERAPY VISIT (OUTPATIENT)
Dept: PHYSICAL THERAPY | Facility: CLINIC | Age: 49
End: 2019-12-05
Payer: OTHER MISCELLANEOUS

## 2019-12-05 DIAGNOSIS — S39.012A BACK STRAIN, INITIAL ENCOUNTER: ICD-10-CM

## 2019-12-05 PROCEDURE — 97110 THERAPEUTIC EXERCISES: CPT | Mod: GP | Performed by: PHYSICAL THERAPIST

## 2019-12-05 PROCEDURE — 97140 MANUAL THERAPY 1/> REGIONS: CPT | Mod: GP | Performed by: PHYSICAL THERAPIST

## 2019-12-05 NOTE — PROGRESS NOTES
Subjective:  HPI                    Objective:  System    Physical Exam    General     ROS    Assessment/Plan:    PROGRESS  REPORT    Progress reporting period is from 11/21/19 to 12/5/19.       SUBJECTIVE  Subjective changes noted by patient:  Patient seen 3 times for treatment of Work Related back pain.  Patient stated he saw the MD this Tuesday and was released back to work with no restrictions.  Patient currently has c/o back stiffness and notes some pain after he is bending over for prolong periods of time (pain with standing up).    Current pain level is : 0/10.     Previous pain level was  : 4/10.   Changes in function:  Yes (See Goal flowsheet attached for changes in current functional level)  Adverse reaction to treatment or activity: None    OBJECTIVE  Changes noted in objective findings:  Yes, patient has been given illustrated HEP to follow and it has been reviewed  TROM: WNL; no complaints.  Strength is good.  Posture is good. Gait is normal     ASSESSMENT/PLAN  Updated problem list and treatment plan: Diagnosis 1:  Back pain  None  STG/LTGs have been met or progress has been made towards goals:  Yes (See Goal flow sheet completed today.)  Assessment of Progress: The patient's condition is improving.  The patient has met all of their long term goals.  Self Management Plans:  Patient has been instructed in a home treatment program.  Patient is independent in a home treatment program.    [place] continues to require the following intervention to meet STG and LTG's:  PT intervention is no longer required to meet STG/LTG.    Recommendations:  This patient is ready to be discharged from therapy and continue their home treatment program.    Please refer to the daily flowsheet for treatment today, total treatment time and time spent performing 1:1 timed codes.

## 2019-12-05 NOTE — LETTER
[facility]  [address]  [facility] [address]  [phone]    2019    Re: Bj Philip   :   1970  MRN:  [id number]   REFERRING PHYSICIAN:   Junior Poe    [facility]    Date of Initial Evaluation:  2019  Visits:  Rxs Used: 3  Reason for Referral:  Back strain, initial encounter    EVALUATION SUMMARY    Assessment/Plan:    PROGRESS  REPORT    Progress reporting period is from 19 to 19.       SUBJECTIVE  Subjective changes noted by patient:  Patient seen 3 times for treatment of Work Related back pain.  Patient stated he saw the MD this Tuesday and was released back to work with no restrictions.  Patient currently has c/o back stiffness and notes some pain after he is bending over for prolong periods of time (pain with standing up).    Current pain level is : 0/10.     Previous pain level was  : 4/10.   Changes in function:  Yes (See Goal flowsheet attached for changes in current functional level)  Adverse reaction to treatment or activity: None    OBJECTIVE  Changes noted in objective findings:  Yes, patient has been given illustrated HEP to follow and it has been reviewed  TROM: WNL; no complaints.  Strength is good.  Posture is good. Gait is normal     ASSESSMENT/PLAN  Updated problem list and treatment plan: Diagnosis 1:  Back pain  None  STG/LTGs have been met or progress has been made towards goals:  Yes (See Goal flow sheet completed today.)  Assessment of Progress: The patient's condition is improving.  The patient has met all of their long term goals.  Self Management Plans:  Patient has been instructed in a home treatment program.  Patient is independent in a home treatment program.    Bj continues to require the following intervention to meet STG and LTG's:  PT intervention is no longer required to meet STG/LTG.      Re: Bj Philip   :    1970    Recommendations:  This patient is ready to be discharged from therapy and continue their home treatment program.          Thank you for your referral.    INQUIRIES  Therapist: Rosalia Olivares, [facility]   [facility]  [address]  [facility] [address]  Phone: [phone]  Fax: [phone]

## 2020-02-21 ENCOUNTER — OFFICE VISIT (OUTPATIENT)
Dept: FAMILY MEDICINE | Facility: CLINIC | Age: 50
End: 2020-02-21
Payer: COMMERCIAL

## 2020-02-21 VITALS
BODY MASS INDEX: 29.26 KG/M2 | HEART RATE: 67 BPM | WEIGHT: 228 LBS | DIASTOLIC BLOOD PRESSURE: 85 MMHG | SYSTOLIC BLOOD PRESSURE: 130 MMHG | HEIGHT: 74 IN | TEMPERATURE: 98.7 F | OXYGEN SATURATION: 99 %

## 2020-02-21 DIAGNOSIS — Z71.84 TRAVEL ADVICE ENCOUNTER: ICD-10-CM

## 2020-02-21 DIAGNOSIS — J30.2 SEASONAL ALLERGIES: ICD-10-CM

## 2020-02-21 DIAGNOSIS — R25.2 MUSCLE CRAMPS: ICD-10-CM

## 2020-02-21 DIAGNOSIS — Z00.00 ROUTINE GENERAL MEDICAL EXAMINATION AT A HEALTH CARE FACILITY: Primary | ICD-10-CM

## 2020-02-21 LAB
ANION GAP SERPL CALCULATED.3IONS-SCNC: 8 MMOL/L (ref 3–14)
BUN SERPL-MCNC: 21 MG/DL (ref 7–30)
CALCIUM SERPL-MCNC: 8.7 MG/DL (ref 8.5–10.1)
CHLORIDE SERPL-SCNC: 106 MMOL/L (ref 94–109)
CHOLEST SERPL-MCNC: 169 MG/DL
CO2 SERPL-SCNC: 26 MMOL/L (ref 20–32)
CREAT SERPL-MCNC: 1.47 MG/DL (ref 0.66–1.25)
GFR SERPL CREATININE-BSD FRML MDRD: 56 ML/MIN/{1.73_M2}
GLUCOSE SERPL-MCNC: 94 MG/DL (ref 70–99)
HDLC SERPL-MCNC: 44 MG/DL
LDLC SERPL CALC-MCNC: 109 MG/DL
MAGNESIUM SERPL-MCNC: 2.3 MG/DL (ref 1.6–2.3)
NONHDLC SERPL-MCNC: 125 MG/DL
POTASSIUM SERPL-SCNC: 4.2 MMOL/L (ref 3.4–5.3)
SODIUM SERPL-SCNC: 140 MMOL/L (ref 133–144)
TRIGL SERPL-MCNC: 82 MG/DL
TSH SERPL DL<=0.005 MIU/L-ACNC: 0.87 MU/L (ref 0.4–4)

## 2020-02-21 PROCEDURE — 99396 PREV VISIT EST AGE 40-64: CPT | Performed by: FAMILY MEDICINE

## 2020-02-21 PROCEDURE — 84443 ASSAY THYROID STIM HORMONE: CPT | Performed by: FAMILY MEDICINE

## 2020-02-21 PROCEDURE — 36415 COLL VENOUS BLD VENIPUNCTURE: CPT | Performed by: FAMILY MEDICINE

## 2020-02-21 PROCEDURE — 99213 OFFICE O/P EST LOW 20 MIN: CPT | Mod: 25 | Performed by: FAMILY MEDICINE

## 2020-02-21 PROCEDURE — 83735 ASSAY OF MAGNESIUM: CPT | Performed by: FAMILY MEDICINE

## 2020-02-21 PROCEDURE — 80061 LIPID PANEL: CPT | Performed by: FAMILY MEDICINE

## 2020-02-21 PROCEDURE — 80048 BASIC METABOLIC PNL TOTAL CA: CPT | Performed by: FAMILY MEDICINE

## 2020-02-21 RX ORDER — FEXOFENADINE HCL AND PSEUDOEPHEDRINE HCI 60; 120 MG/1; MG/1
1 TABLET, EXTENDED RELEASE ORAL 2 TIMES DAILY
Qty: 60 TABLET | Refills: 2 | Status: SHIPPED | OUTPATIENT
Start: 2020-02-21 | End: 2020-12-02

## 2020-02-21 RX ORDER — MEFLOQUINE HYDROCHLORIDE 250 MG/1
250 TABLET ORAL
Qty: 8 TABLET | Refills: 0 | Status: SHIPPED | OUTPATIENT
Start: 2020-02-21 | End: 2020-12-02

## 2020-02-21 RX ORDER — FLUTICASONE PROPIONATE 50 MCG
1-2 SPRAY, SUSPENSION (ML) NASAL DAILY
Qty: 32 ML | Refills: 3 | Status: SHIPPED | OUTPATIENT
Start: 2020-02-21 | End: 2020-12-02

## 2020-02-21 ASSESSMENT — MIFFLIN-ST. JEOR: SCORE: 1978.95

## 2020-02-21 NOTE — PROGRESS NOTES
3  SUBJECTIVE:   CC: Bj Philip is an 49 year old male who presents for preventive health visit.     Healthy Habits:    Do you get at least three servings of calcium containing foods daily (dairy, green leafy vegetables, etc.)? 1-2 servings     Amount of exercise or daily activities, outside of work: 3-4 day(s) per week    Problems taking medications regularly not applicable    Medication side effects: No    Have you had an eye exam in the past two years? yes    Do you see a dentist twice per year? no    Do you have sleep apnea, excessive snoring or daytime drowsiness? no          Today's PHQ-2 Score:   PHQ-2 ( 1999 [employer]) 2/21/2020 2/26/2018   Q1: Little interest or pleasure in doing things 0 0   Q2: Feeling down, depressed or hopeless 0 0   PHQ-2 Score 0 0       Abuse: Current or Past(Physical, Sexual or Emotional)- No  Do you feel safe in your environment? Yes        Social History     Tobacco Use     Smoking status: Never Smoker     Smokeless tobacco: Never Used   Substance Use Topics     Alcohol use: No     If you drink alcohol do you typically have >3 drinks per day or >7 drinks per week? Not Applicable                      Last PSA: No results found for: PSA    Reviewed orders with patient. Reviewed health maintenance and updated orders accordingly - Yes  Lab work is in process  Labs reviewed in EPIC  BP Readings from Last 3 Encounters:   02/21/20 130/85   08/15/19 124/84   05/21/19 108/76    Wt Readings from Last 3 Encounters:   02/21/20 103.4 kg (228 lb)   08/15/19 101 kg (222 lb 9.6 oz)   01/31/19 103.4 kg (228 lb)                    Reviewed and updated as needed this visit by clinical staff  Tobacco  Allergies  Meds  Problems  Med Hx  Surg Hx  Fam Hx         Reviewed and updated as needed this visit by Provider  Tobacco  Allergies  Meds  Problems  Med Hx  Surg Hx  Fam Hx        Here today for routine checkup.  Generally doing well overall and stays very physically fit.  He has  "brought up the issue of muscle cramping in the past but lab work is essentially been normal.  He is still noticing this.  Still able to do his usual workouts though he feels as though he fatigues a bit earlier.  If he works out muscle groups aggressively he will get cramps in those areas at night.  Can affect both arms and legs and sometimes even abdomen.  No headaches or visual changes.    ROS:  CONSTITUTIONAL: NEGATIVE for fever, chills, change in weight  INTEGUMENTARY/SKIN: NEGATIVE for worrisome rashes, moles or lesions  EYES: NEGATIVE for vision changes or irritation  ENT: NEGATIVE for ear, mouth and throat problems  RESP: NEGATIVE for significant cough or SOB  CV: NEGATIVE for chest pain, palpitations or peripheral edema  GI: NEGATIVE for nausea, abdominal pain, heartburn, or change in bowel habits   male: negative for dysuria, hematuria, decreased urinary stream, erectile dysfunction, urethral discharge  MUSCULOSKELETAL: As above  NEURO: NEGATIVE for weakness, dizziness or paresthesias  PSYCHIATRIC: NEGATIVE for changes in mood or affect    OBJECTIVE:   /85 (BP Location: Right arm, Patient Position: Chair, Cuff Size: Adult Large)   Pulse 67   Temp 98.7  F (37.1  C) (Oral)   Ht 1.88 m (6' 2\")   Wt 103.4 kg (228 lb)   SpO2 99%   BMI 29.27 kg/m    EXAM:  GENERAL: healthy, alert and no distress  EYES: Eyes grossly normal to inspection, PERRL and conjunctivae and sclerae normal  HENT: ear canals and TM's normal, nose and mouth without ulcers or lesions  NECK: no adenopathy, no asymmetry, masses, or scars and thyroid normal to palpation  RESP: lungs clear to auscultation - no rales, rhonchi or wheezes  CV: regular rate and rhythm, normal S1 S2, no S3 or S4, no murmur, click or rub, no peripheral edema and peripheral pulses strong  ABDOMEN: soft, nontender, no hepatosplenomegaly, no masses and bowel sounds normal  MS: no gross musculoskeletal defects noted, no edema  SKIN: no suspicious lesions or " "rashes  NEURO: Normal strength and tone, mentation intact and speech normal  PSYCH: mentation appears normal, affect normal/bright    Diagnostic Test Results:  Labs reviewed in Epic    ASSESSMENT/PLAN:   1. Routine general medical examination at a health care facility  Routine health maintenance up-to-date otherwise  - Lipid panel reflex to direct LDL Non-fasting    2. Travel advice encounter -upcoming trip to [facility]  Discussed mechanism of action of the proposed medication, as well as potential effects, both good and bad.  Patient expressed understanding and agreed with treatment.   - mefloquine 250 MG PO tablet; Take 1 tablet (250 mg) by mouth every 7 days Continue 4 weeks after return.  Dispense: 8 tablet; Refill: 0    3. Muscle cramps  I do not suspect any significant pathology as this seems more of a functional cramping and basic advice was given, but I think it is worthwhile taking a look at some electrolyte levels and thyroid  - Basic metabolic panel  - TSH with free T4 reflex  - Magnesium    COUNSELING:  Reviewed preventive health counseling, as reflected in patient instructions       Regular exercise       Healthy diet/nutrition       Vision screening       Colon cancer screening       Prostate cancer screening    Estimated body mass index is 29.27 kg/m  as calculated from the following:    Height as of this encounter: 1.88 m (6' 2\").    Weight as of this encounter: 103.4 kg (228 lb).         reports that he has never smoked. He has never used smokeless tobacco.      Counseling Resources:  ATP IV Guidelines  Pooled Cohorts Equation Calculator  FRAX Risk Assessment  ICSI Preventive Guidelines  Dietary Guidelines for Americans, 2010  USDA's MyPlate  ASA Prophylaxis  Lung CA Screening    Acacia Petersen MD  [facility]  "

## 2020-02-21 NOTE — PATIENT INSTRUCTIONS
Preventive Health Recommendations  Male Ages 40 to 49    Yearly exam:             See your health care provider every year in order to  o   Review health changes.   o   Discuss preventive care.    o   Review your medicines if your doctor has prescribed any.    You should be tested each year for STDs (sexually transmitted diseases) if you re at risk.     Have a cholesterol test every 5 years.     Have a colonoscopy (test for colon cancer) if someone in your family has had colon cancer or polyps before age 50.     After age 45, have a diabetes test (fasting glucose). If you are at risk for diabetes, you should have this test every 3 years.      Talk with your health care provider about whether or not a prostate cancer screening test (PSA) is right for you.    Shots: Get a flu shot each year. Get a tetanus shot every 10 years.     Nutrition:    Eat at least 5 servings of fruits and vegetables daily.     Eat whole-grain bread, whole-wheat pasta and brown rice instead of white grains and rice.     Get adequate Calcium and Vitamin D.     Lifestyle    Exercise for at least 150 minutes a week (30 minutes a day, 5 days a week). This will help you control your weight and prevent disease.     Limit alcohol to one drink per day.     No smoking.     Wear sunscreen to prevent skin cancer.     See your dentist every six months for an exam and cleaning.        Suggestions for cramps:    1) stay hydrated    2) stretch (feet, calves, etc.) before bed    3) try over the counter supplement with potassium and/or magnesium   - (probabaly even a good multivitamin)

## 2020-02-24 ENCOUNTER — TELEPHONE (OUTPATIENT)
Dept: FAMILY MEDICINE | Facility: CLINIC | Age: 50
End: 2020-02-24

## 2020-02-24 NOTE — RESULT ENCOUNTER NOTE
Amado,  Your lab work looks just fine - certainly nothing to explain the cramps.  It certainly would be fine to try an over the counter magnesium supplement to see if that helps.      The only thing of note is that there has been a change in kidney function (as measured by serum creatinine and GFR).  I don't know that I make too much of it - probably just some relative dehydration - so nothing you need to do per se.  But we will keep an eye on this just to make sure.  Let's plan to recheck it in a couple of months, once you are back from your trip.    JUAN F Petersen M.D.

## 2020-02-24 NOTE — TELEPHONE ENCOUNTER
PA for mefloquine 250 MG PO tablet    Visit [email or web address] and enter your credentials    Enter key:  [id number]    Enter last name and     Review the available options to resolve this rejection    PA or alternative    Regina Ruiz

## 2020-02-24 NOTE — TELEPHONE ENCOUNTER
"PA for fluticasone (FLONASE) 50 MCG/ACT NA nasal spray    Go to [email or web address].[email or web address] and click \"Enter a Key\"    Key:  [id number]    Enter last name and     Confirm the form selection to auto fill the patient, prescriber and drug information.  Add medicals details where necessary and click \"Send to Plan\"    PA or alternative    Regina Ruiz    "
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
## 2020-03-24 ENCOUNTER — VIRTUAL VISIT (OUTPATIENT)
Dept: FAMILY MEDICINE | Facility: CLINIC | Age: 50
End: 2020-03-24
Payer: COMMERCIAL

## 2020-03-24 DIAGNOSIS — R19.7 DIARRHEA, UNSPECIFIED TYPE: Primary | ICD-10-CM

## 2020-03-24 PROCEDURE — 99441 ZZC PHYSICIAN TELEPHONE EVALUATION 5-10 MIN: CPT | Mod: TEL | Performed by: FAMILY MEDICINE

## 2020-03-24 RX ORDER — CIPROFLOXACIN 500 MG/1
500 TABLET, FILM COATED ORAL 2 TIMES DAILY
Qty: 14 TABLET | Refills: 0 | Status: SHIPPED | OUTPATIENT
Start: 2020-03-24 | End: 2020-04-01

## 2020-03-24 NOTE — PROGRESS NOTES
"Bj Philip is a 49 year old male who is being evaluated via a billable telephone visit.      The patient has been notified of following:     \"This telephone visit will be conducted via a call between you and your physician/provider. We have found that certain health care needs can be provided without the need for a physical exam.  This service lets us provide the care you need with a short phone conversation.  If a prescription is necessary we can send it directly to your pharmacy.  If lab work is needed we can place an order for that and you can then stop by our lab to have the test done at a later time.    If during the course of the call the physician/provider feels a telephone visit is not appropriate, you will not be charged for this service.\"     Bj Philip complains of    Chief Complaint   Patient presents with     Abdominal Pain     Patient having troubles with nausea, chills and diarrhea with seasoned or spicy food.    I have reviewed and updated the patient's Past Medical History, Social History, Family History and Medication List.    ALLERGIES  Seasonal allergies      Additional provider notes:   Spoke with patient via phone regarding some GI symptoms.  Was on a trip to [place] a few weeks ago when after eating a very spicy meal he did not feel well.  Following that he had some stomach cramping and diarrhea for couple of days.  Eventually presented to a pharmacy there and they told him to take some antibiotics but he was not sure what this was.  Did not think it really helped and he just stuck with bland food.  Since being back he still seems very sensitive to spicy or foods.  This brings  call on an upset stomach and little bit of reflux symptoms.  Still some loose stools though not to the point of full-blown diarrhea.  Symptoms are worse at night and occasionally he feels chills.  But his stomach is not sore.  Much better with bland food.  No nausea and he denies outright " heartburn.    Assessment/Plan:  1. Diarrhea, unspecified type  Discussed the difficulty knowing exactly what is going on especially with recent hand outbreak.  Could consider stool testing but the fact that is not full-blown diarrhea I think we can hold off on this for now and just assume that this is a form of traveler's diarrhea.  We can treated appropriately with a course of ciprofloxacin.  But, if this is not easily resolving we may want to consider doing some blood in stool testing looking for further infectious agents.  Patient understands and agrees with the plan.  Also suggested an over-the-counter acid blocker for a couple of weeks  - ciprofloxacin (CIPRO) 500 MG tablet; Take 1 tablet (500 mg) by mouth 2 times daily for 7 days  Dispense: 14 tablet; Refill: 0    Phone call duration:  9 minutes    Acacia Petersen MD

## 2020-04-01 ENCOUNTER — VIRTUAL VISIT (OUTPATIENT)
Dept: FAMILY MEDICINE | Facility: CLINIC | Age: 50
End: 2020-04-01
Payer: COMMERCIAL

## 2020-04-01 DIAGNOSIS — R68.83 CHILLS (WITHOUT FEVER): ICD-10-CM

## 2020-04-01 DIAGNOSIS — K29.00 ACUTE GASTRITIS WITHOUT HEMORRHAGE, UNSPECIFIED GASTRITIS TYPE: ICD-10-CM

## 2020-04-01 DIAGNOSIS — K29.00 ACUTE GASTRITIS WITHOUT HEMORRHAGE, UNSPECIFIED GASTRITIS TYPE: Primary | ICD-10-CM

## 2020-04-01 DIAGNOSIS — R19.7 DIARRHEA, UNSPECIFIED TYPE: ICD-10-CM

## 2020-04-01 LAB
ALBUMIN SERPL-MCNC: 3.8 G/DL (ref 3.4–5)
ALP SERPL-CCNC: 49 U/L (ref 40–150)
ALT SERPL W P-5'-P-CCNC: 33 U/L (ref 0–70)
ANION GAP SERPL CALCULATED.3IONS-SCNC: 4 MMOL/L (ref 3–14)
AST SERPL W P-5'-P-CCNC: 21 U/L (ref 0–45)
BILIRUB SERPL-MCNC: 0.5 MG/DL (ref 0.2–1.3)
BUN SERPL-MCNC: 16 MG/DL (ref 7–30)
CALCIUM SERPL-MCNC: 8.7 MG/DL (ref 8.5–10.1)
CHLORIDE SERPL-SCNC: 107 MMOL/L (ref 94–109)
CO2 SERPL-SCNC: 28 MMOL/L (ref 20–32)
CREAT SERPL-MCNC: 1.25 MG/DL (ref 0.66–1.25)
DIFFERENTIAL METHOD BLD: ABNORMAL
EOSINOPHIL # BLD AUTO: 0 10E9/L (ref 0–0.7)
EOSINOPHIL NFR BLD AUTO: 1 %
ERYTHROCYTE [DISTWIDTH] IN BLOOD BY AUTOMATED COUNT: 13 % (ref 10–15)
GFR SERPL CREATININE-BSD FRML MDRD: 68 ML/MIN/{1.73_M2}
GLUCOSE SERPL-MCNC: 93 MG/DL (ref 70–99)
HCT VFR BLD AUTO: 48.6 % (ref 40–53)
HGB BLD-MCNC: 16.1 G/DL (ref 13.3–17.7)
LYMPHOCYTES # BLD AUTO: 3.5 10E9/L (ref 0.8–5.3)
LYMPHOCYTES NFR BLD AUTO: 77 %
MCH RBC QN AUTO: 31.1 PG (ref 26.5–33)
MCHC RBC AUTO-ENTMCNC: 33.1 G/DL (ref 31.5–36.5)
MCV RBC AUTO: 94 FL (ref 78–100)
MONOCYTES # BLD AUTO: 0.2 10E9/L (ref 0–1.3)
MONOCYTES NFR BLD AUTO: 5 %
NEUTROPHILS # BLD AUTO: 0.8 10E9/L (ref 1.6–8.3)
NEUTROPHILS NFR BLD AUTO: 17 %
PLATELET # BLD AUTO: 173 10E9/L (ref 150–450)
PLATELET # BLD EST: ABNORMAL 10*3/UL
POTASSIUM SERPL-SCNC: 3.9 MMOL/L (ref 3.4–5.3)
PROT SERPL-MCNC: 7.5 G/DL (ref 6.8–8.8)
RBC # BLD AUTO: 5.17 10E12/L (ref 4.4–5.9)
RBC MORPH BLD: NORMAL
SODIUM SERPL-SCNC: 139 MMOL/L (ref 133–144)
VARIANT LYMPHS BLD QL SMEAR: PRESENT
WBC # BLD AUTO: 4.5 10E9/L (ref 4–11)

## 2020-04-01 PROCEDURE — 87338 HPYLORI STOOL AG IA: CPT | Performed by: FAMILY MEDICINE

## 2020-04-01 PROCEDURE — 85025 COMPLETE CBC W/AUTO DIFF WBC: CPT | Performed by: FAMILY MEDICINE

## 2020-04-01 PROCEDURE — 36415 COLL VENOUS BLD VENIPUNCTURE: CPT | Performed by: FAMILY MEDICINE

## 2020-04-01 PROCEDURE — 80053 COMPREHEN METABOLIC PANEL: CPT | Performed by: FAMILY MEDICINE

## 2020-04-01 PROCEDURE — 99214 OFFICE O/P EST MOD 30 MIN: CPT | Mod: TEL | Performed by: FAMILY MEDICINE

## 2020-04-01 PROCEDURE — 87506 IADNA-DNA/RNA PROBE TQ 6-11: CPT | Performed by: FAMILY MEDICINE

## 2020-04-01 NOTE — PROGRESS NOTES
"Subjective     Bj Philip is a 49 year old male who is being evaluated via a billable telephone visit.      The patient has been notified of following:     \"This telephone visit will be conducted via a call between you and your physician/provider. We have found that certain health care needs can be provided without the need for a physical exam.  This service lets us provide the care you need with a short phone conversation.  If a prescription is necessary we can send it directly to your pharmacy.  If lab work is needed we can place an order for that and you can then stop by our lab to have the test done at a later time.    If during the course of the call the physician/provider feels a telephone visit is not appropriate, you will not be charged for this service.\"     Patient has given verbal consent for Telephone visit?  Yes    Bj Philip complains of   Chief Complaint   Patient presents with     Chills     Since August or September was having chills - taking acid reflux medication.  Saw Dr. Petersen prior to travel to [place] - ate something - 2 am stomach, diarrhea, emesis - 2-3 days.  Helped some with antibiotics.  Continues with chills   Returned - every evening chilled.  Sick with the diarrhea again - by Saturday AM - stomach pain and something on chest.  Has felt better again this week with regard to stool patterns.  Burning in chest and stomach now.  Temp highest - 99.2 on Saturday.  No nausea now.  No chills in last 2 days.  Soft stools now.  Taking cimetidine but does not feel it is very effective.  Previously treated with Zantac which is currently unavailable    ALLERGIES  Seasonal allergies               BP Readings from Last 3 Encounters:   02/21/20 130/85   08/15/19 124/84   05/21/19 108/76    Wt Readings from Last 3 Encounters:   02/21/20 103.4 kg (228 lb)   08/15/19 101 kg (222 lb 9.6 oz)   01/31/19 103.4 kg (228 lb)                    Reviewed and updated as needed this visit by " Provider  Tobacco  Allergies  Meds  Problems  Med Hx  Surg Hx  Fam Hx         Review of Systems   ROS COMP: Constitutional, HEENT, cardiovascular, pulmonary, gi and gu systems are negative, except as otherwise noted.       Objective   Reported vitals:  There were no vitals taken for this visit.   alert and no distress  Psych: Alert and oriented times 3; coherent speech, normal   rate and volume, able to articulate logical thoughts, able   to abstract reason, no tangential thoughts, no hallucinations   or delusions  His affect is anxious - mild     Diagnostic Test Results:  Labs reviewed in Epic        Assessment/Plan:  1. Acute gastritis without hemorrhage, unspecified gastritis type  Many of his symptoms appear to be irritation of the gastric lining and reflux related.  We will discontinue the cimetidine and begin omeprazole 30 to 60 minutes prior to a meal.  Patient is instructed to present to the clinic for some lab testing including a CMP and supplies for home stool testing for H. pylori.  - **Comprehensive metabolic panel FUTURE anytime; Future  - Helicobacter pylori Antigen Stool; Future  - omeprazole (PRILOSEC) 20 MG DR capsule; Take 1 capsule (20 mg) by mouth daily 30-60 min prior to a meal.  Dispense: 30 capsule; Refill: 0    2. Diarrhea, unspecified type  Improved currently but continues to have episodic recurrence.  Will bring container for stool testing home with him.  Discussed the fact that these need to be diarrheal stools in order to run the testing.  - Enteric Bacteria and Virus Panel by GAB[facility]ELA Stool; Future    3. Chills (without fever)  The symptoms have been present since August.  He is quite concerned about these symptoms and whether there is something else underlying causing them.  Present to clinic today for laboratory testing orders are in place.  Additional recommendations will be made may start based on laboratory results.  - CBC with platelets and differential; Future  - **Comprehensive  metabolic panel FUTURE anytime; Future    Return in about 1 day (around 4/2/2020) for Lab Work.      Phone call duration:  19 minutes    Teri Singh MD      [id number] [phone]    This chart was documented by provider using a voice activated software called Dragon in addition to manual typing. There may be vocabulary errors or other grammatical errors due to this.

## 2020-04-01 NOTE — PATIENT INSTRUCTIONS
Discontinue cimetidine.  Begin omeprazole 30 to 60 minutes prior to a meal once daily.    Lab testing recommended for additional evaluation including stool testing for H. pylori and evaluation for infectious causes for the diarrhea.

## 2020-04-02 LAB
C COLI+JEJUNI+LARI FUSA STL QL NAA+PROBE: NOT DETECTED
EC STX1 GENE STL QL NAA+PROBE: NOT DETECTED
EC STX2 GENE STL QL NAA+PROBE: NOT DETECTED
ENTERIC PATHOGEN COMMENT: NORMAL
H PYLORI AG STL QL IA: NEGATIVE
NOROV GI+II ORF1-ORF2 JNC STL QL NAA+PR: NOT DETECTED
RVA NSP5 STL QL NAA+PROBE: NOT DETECTED
SALMONELLA SP RPOD STL QL NAA+PROBE: NOT DETECTED
SHIGELLA SP+EIEC IPAH STL QL NAA+PROBE: NOT DETECTED
V CHOL+PARA RFBL+TRKH+TNAA STL QL NAA+PR: NOT DETECTED
Y ENTERO RECN STL QL NAA+PROBE: NOT DETECTED

## 2020-04-02 NOTE — RESULT ENCOUNTER NOTE
Your blood cell counts are normal.    Your blood sugar, kidney and liver testing are all normal.  Please call or MyChart message me if you have any questions.    PSK

## 2020-04-09 NOTE — RESULT ENCOUNTER NOTE
Your stool testing is all normal.  There is not any pathologic bacteria or virus noted.  The h.pylori testing is negative as well.  Hopefully your stomach symptoms are improved/resolved with the omeprazole.  If symptoms are worsening or not improved, please follow up with either myself or Dr. Petersen.  Please call or MyChart message me if you have any questions.    ROGERK

## 2020-04-23 ENCOUNTER — TELEPHONE (OUTPATIENT)
Dept: ORTHOPEDICS | Facility: CLINIC | Age: 50
End: 2020-04-23

## 2020-04-23 ENCOUNTER — VIRTUAL VISIT (OUTPATIENT)
Dept: ORTHOPEDICS | Facility: CLINIC | Age: 50
End: 2020-04-23
Payer: COMMERCIAL

## 2020-04-23 VITALS — BODY MASS INDEX: 28.88 KG/M2 | HEIGHT: 74 IN | WEIGHT: 225 LBS

## 2020-04-23 DIAGNOSIS — M25.521 RIGHT ELBOW PAIN: Primary | ICD-10-CM

## 2020-04-23 PROCEDURE — 99212 OFFICE O/P EST SF 10 MIN: CPT | Mod: 95 | Performed by: FAMILY MEDICINE

## 2020-04-23 ASSESSMENT — MIFFLIN-ST. JEOR: SCORE: 1965.34

## 2020-04-23 NOTE — TELEPHONE ENCOUNTER
Reason for call:  Amado is returning a missed call for a phone visit this morning.  Please call back to reschedule.    Phone number to reach patient:  Cell number on file:    Telephone Information:   Mobile [phone]       Best Time:  any    Can we leave a detailed message on this number?  NO
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
## 2020-04-24 NOTE — PATIENT INSTRUCTIONS
Diagnosis: Right Triceps Tendinosis  Image Findings: MRI showing Triceps Tendinosis no significant tear  Treatment: Rest, home exercises, plan for PRP injection   Medications: Limited tylenol/ibuprofen for pain for 1-2 weeks  Follow-up: As needed please contact in 3 weeks regarding possible PRP injection    I estimate the earliest we could do a PRP injection would be the beginning of June due to restrictions of our visits.  I will keep you posted if this changes.    Sincerely,    Randolph Cox MD

## 2020-06-09 ENCOUNTER — DOCUMENTATION ONLY (OUTPATIENT)
Dept: ORTHOPEDICS | Facility: CLINIC | Age: 50
End: 2020-06-09

## 2020-06-15 DIAGNOSIS — K29.00 ACUTE GASTRITIS WITHOUT HEMORRHAGE, UNSPECIFIED GASTRITIS TYPE: ICD-10-CM

## 2020-06-17 NOTE — TELEPHONE ENCOUNTER
Prescription approved per [facility] Refill Protocol.    Ana Luisa Madison RN, [facility] Triage

## 2020-07-14 DIAGNOSIS — K29.00 ACUTE GASTRITIS WITHOUT HEMORRHAGE, UNSPECIFIED GASTRITIS TYPE: ICD-10-CM

## 2020-07-15 NOTE — TELEPHONE ENCOUNTER
Prescription approved per [facility] Refill Protocol.    Lia Sauceda RN

## 2020-07-23 ENCOUNTER — OFFICE VISIT (OUTPATIENT)
Dept: URGENT CARE | Facility: URGENT CARE | Age: 50
End: 2020-07-23
Payer: COMMERCIAL

## 2020-07-23 VITALS
HEART RATE: 79 BPM | BODY MASS INDEX: 29.25 KG/M2 | SYSTOLIC BLOOD PRESSURE: 130 MMHG | TEMPERATURE: 96.8 F | OXYGEN SATURATION: 97 % | WEIGHT: 227.8 LBS | DIASTOLIC BLOOD PRESSURE: 86 MMHG

## 2020-07-23 DIAGNOSIS — B07.0 PLANTAR WARTS: Primary | ICD-10-CM

## 2020-07-23 PROCEDURE — 17110 DESTRUCTION B9 LES UP TO 14: CPT | Performed by: FAMILY MEDICINE

## 2020-07-23 PROCEDURE — 99213 OFFICE O/P EST LOW 20 MIN: CPT | Mod: 25 | Performed by: FAMILY MEDICINE

## 2020-07-23 ASSESSMENT — ENCOUNTER SYMPTOMS
COUGH: 0
DIAPHORESIS: 0
CHILLS: 0
RHINORRHEA: 0
ROS SKIN COMMENTS: PER HPI
VOMITING: 0
SHORTNESS OF BREATH: 0
NAUSEA: 0
SORE THROAT: 0
DIARRHEA: 0
FEVER: 0

## 2020-07-23 NOTE — PROGRESS NOTES
SUBJECTIVE:   Bj Philip is a 49 year old male presenting with a chief complaint of   Chief Complaint   Patient presents with     Foreign Body     Something stuck in the bottom of right foot      Amado is a 49-year-old male presenting today with concern of a foreign body that may be stuck in the bottom of his right foot he first noticed this 3 and half to 4 weeks ago but indicates over the past couple weeks is gotten much more painful he is currently having 5/10 pain.  He is able to walk he is able to walk okay when he is wearing shoes although seems to be more painful with bare feet.    Review of Systems   Constitutional: Negative for chills, diaphoresis and fever.   HENT: Negative for congestion, ear pain, rhinorrhea and sore throat.    Respiratory: Negative for cough and shortness of breath.    Gastrointestinal: Negative for diarrhea, nausea and vomiting.   Skin:        Per hpi        Past Medical History:   Diagnosis Date     Pain     left thumb     Seasonal allergies      Family History   Problem Relation Age of Onset     Anesthesia Reaction No family hx of      Blood Disease No family hx of      Hypertension No family hx of      Cerebrovascular Disease No family hx of      C.A.D. No family hx of      Diabetes No family hx of      Cancer No family hx of      Current Outpatient Medications   Medication Sig Dispense Refill     omeprazole (PRILOSEC) 20 MG DR capsule TAKE 1 CAPSULE(20 MG) BY MOUTH DAILY 30 TO 60 MINUTES BEFORE A MEAL 30 capsule 2     fexofenadine-pseudoePHEDrine  MG PO 12 hr tablet Take 1 tablet by mouth 2 times daily (Patient not taking: Reported on 7/23/2020) 60 tablet 2     fluticasone (FLONASE) 50 MCG/ACT NA nasal spray Spray 1-2 sprays into both nostrils daily (Patient not taking: Reported on 4/1/2020) 32 mL 3     mefloquine 250 MG PO tablet Take 1 tablet (250 mg) by mouth every 7 days Continue 4 weeks after return. (Patient not taking: Reported on 4/1/2020) 8 tablet 0     Social  History     Tobacco Use     Smoking status: Never Smoker     Smokeless tobacco: Never Used   Substance Use Topics     Alcohol use: No       OBJECTIVE  /86   Pulse 79   Temp 96.8  F (36  C) (Tympanic)   Wt 103.3 kg (227 lb 12.8 oz)   SpO2 97%   BMI 29.25 kg/m      Physical Exam  Cardiovascular:      Rate and Rhythm: Normal rate.      Pulses: Normal pulses.   Pulmonary:      Effort: Pulmonary effort is normal.   Skin:     General: Skin is warm.      Capillary Refill: Capillary refill takes less than 2 seconds.      Comments: Classic appearing common plantar wart on the medial aspect of his right foot overlying the metatarsal.  Small isolated in size slightly elevated from the surface of the skin with a few surface capillaries visualized.   Neurological:      General: No focal deficit present.      Mental Status: He is alert.           ASSESSMENT:    ICD-10-CM    1. Plantar warts  B07.0 salicylic acid (MEDIPLAST) 40 % miscellaneous     DESTRUCT PREMALIGNANT LESION, FIRST        PLAN:  Liquid nitrogen freeze x3 freeze thaw cycles was performed brief 3 to 4-second spray cycles utilized  Will apply mediplast at night x 10 nights   Consider return for refreeze with 2 to 3 weeks time if this is not resolving  Instructed on proper use of a nail file to exfoliate the top surface of the area which should help create inflammation and improvement of this plantar wart  The patient indicates understanding of these issues and agrees with the plan.   Patient educational/instructional material provided including reasons for follow-up   Hamilton Harry MD

## 2020-08-20 ENCOUNTER — OFFICE VISIT (OUTPATIENT)
Dept: ORTHOPEDICS | Facility: CLINIC | Age: 50
End: 2020-08-20

## 2020-08-20 DIAGNOSIS — M25.521 RIGHT ELBOW PAIN: ICD-10-CM

## 2020-08-20 DIAGNOSIS — M77.8 TRICEPS TENDINITIS: Primary | ICD-10-CM

## 2020-08-20 PROCEDURE — 0232T NJX PLATELET PLASMA: CPT | Performed by: FAMILY MEDICINE

## 2020-08-20 RX ORDER — HYDROCODONE BITARTRATE AND ACETAMINOPHEN 5; 325 MG/1; MG/1
1 TABLET ORAL EVERY 6 HOURS PRN
Qty: 5 TABLET | Refills: 0 | Status: SHIPPED | OUTPATIENT
Start: 2020-08-20 | End: 2020-12-02

## 2020-08-20 NOTE — PROGRESS NOTES
Bj Philip  :  1970  DOS: 2020  MRN: [id number]    Sports Medicine Clinic Procedure      Ultrasound Guided right triceps tendon PRP Injection      Bj Philip has elected to receive a right Elbow  Triceps Tendon PRP injection to help with modulation of pain and to promote healing. Sonographic guidance will be used to ensure accurate placement of the medication into the triceps tendon.      Diagnosis: (M77.9) Triceps tendinitis  (primary encounter diagnosis)  Comment: Sx persisting after suspected fracture of enthesophyte with pain limiting ability to workout.  Plan to tx with PRP injection, limited course of Norco and /follow-up in 2-3 weeks for repeat evaluation.    Plan: Right triceps tendon PRP injection        PRP PROCEDURE:  The patient was prepped and approximately 120 cc of whole blood was withdrawn using a standard sterile technique via antecubiatal access of the arm. The whole blood was processed on location in the [employer] Tanner System and approximately 5 cc of Platelet Rich Plasma was obtained.      Technique: The risks of the procedure were explained to the patient.  A consent was signed for the right triceps tendon PRP injection.  The patient was evaluated with a SC[employer] ultrasound machine using a 12 MHz linear probe.       The right Elbow  was prepped and draped in a sterile manner.  Ultrasound identification of the right triceps tendon in both long and short axis.  Area blood vessels noted.  The probe was placed in a oblique-sagittal axis to the right Elbow  joint.  A 2 inch 25 gauge needle was placed under ultrasound guidance into the right Elbow  triceps tendon.   A solution with total of volume of 5 cc PRP was injected into the right triceps tendon in both long and short axis taking care to distribute the volume throughout all areas.  There was ultrasound documentation of needle placement and injection.        Impression:  Successful right intra-articular PRP right  triceps tendon injection.      Plan:  Follow up prn   Will provide update in 1-2 and 4 weeks  Discussed potential next steps based on clinical progress  Advised will likely be sore over next 2-14 days, OTC and supportive care reviewed  A one time Norco Rx ordered  All questions were answered today  Contact us with additional questions or concerns  Signs and sx of concern reviewed      Randolph Cox MD CAQ, MD  Primary Care Sports Medicine  [facility]

## 2020-08-20 NOTE — LETTER
2020         RE: Bj Philip  [address] [place] [address]        Dear Colleague,    Thank you for referring your patient, Bj Philip, to the [facility]. Please see a copy of my visit note below.    Bj Philip  :  1970  DOS: 2020  MRN: [id number]    Sports Medicine Clinic Procedure      Ultrasound Guided right triceps tendon PRP Injection      Bj Philip has elected to receive a right Elbow  Triceps Tendon PRP injection to help with modulation of pain and to promote healing. Sonographic guidance will be used to ensure accurate placement of the medication into the triceps tendon.      Diagnosis: (M77.9) Triceps tendinitis  (primary encounter diagnosis)  Comment: Sx persisting after suspected fracture of enthesophyte with pain limiting ability to workout.  Plan to tx with PRP injection, limited course of Norco and /follow-up in 2-3 weeks for repeat evaluation.    Plan: Right triceps tendon PRP injection        PRP PROCEDURE:  The patient was prepped and approximately 120 cc of whole blood was withdrawn using a standard sterile technique via antecubiatal access of the arm. The whole blood was processed on location in the [employer] Tanner System and approximately 5 cc of Platelet Rich Plasma was obtained.      Technique: The risks of the procedure were explained to the patient.  A consent was signed for the right triceps tendon PRP injection.  The patient was evaluated with a [employer] ultrasound machine using a 12 MHz linear probe.       The right Elbow  was prepped and draped in a sterile manner.  Ultrasound identification of the right triceps tendon in both long and short axis.  Area blood vessels noted.  The probe was placed in a oblique-sagittal axis to the right Elbow  joint.  A 2 inch 25 gauge needle was placed under ultrasound guidance into the right Elbow  triceps tendon.   A solution with total of volume of 5 cc PRP  was injected into the right triceps tendon in both long and short axis taking care to distribute the volume throughout all areas.  There was ultrasound documentation of needle placement and injection.        Impression:  Successful right intra-articular PRP right triceps tendon injection.      Plan:  Follow up prn   Will provide update in 1-2 and 4 weeks  Discussed potential next steps based on clinical progress  Advised will likely be sore over next 2-14 days, OTC and supportive care reviewed  A one time Norco Rx ordered  All questions were answered today  Contact us with additional questions or concerns  Signs and sx of concern reviewed      MD COURT Duran, MD  Primary Care Sports Medicine  [facility]       Again, thank you for allowing me to participate in the care of your patient.        Sincerely,        Randolph Cox MD

## 2020-08-20 NOTE — PATIENT INSTRUCTIONS
PRP (Platelet Rich Plasma) Post-Procedure Instructions  1. Do not take anti-inflammatories (Motrin, Advil, Naprosyn, Naproxen, Aleve, Indocin, Mobic, Toradol, Voltaren, Arthrotec, Ibuprofen, Diclofenac) for 2 weeks after the procedure  2. You can take Tylenol up to 1000 mg / dose and no more than 3,000 mg / day  3. You will be given a prescription strength pain medication (Norco) to be used for severe pain.  No driving or alcohol while taking narcotics.  4. If pain is persistent after Tylenol and Norco you may apply ice to the affected area for 20 minutes. Limit icing within the first 2 weeks.  5. Plan to rest the remainder of the day after the procedure.  6. A follow-up appointment should be scheduled for 1 week after the procedure.  7. Formal physical therapy, if needed, will begin 1-2 weeks after the procedure.  8. It can take up to 6-8 weeks to determine your full response to the PRP injection    For elbow tendinopathy / tearing  1. You will be in a sling for 3 - 7 days. Gentle range of motion out of the sling is necessary / encouraged.  2. No lifting more than a glass of water for first 3 days, no more than a pot of coffee from 3- 7 days.    3. No strength training, hitting or tennis for at least 6-8 weeks    It was great seeing you again today!    Randolph Cox

## 2020-09-08 ENCOUNTER — OFFICE VISIT (OUTPATIENT)
Dept: ORTHOPEDICS | Facility: CLINIC | Age: 50
End: 2020-09-08
Payer: COMMERCIAL

## 2020-09-08 VITALS
HEART RATE: 73 BPM | DIASTOLIC BLOOD PRESSURE: 83 MMHG | SYSTOLIC BLOOD PRESSURE: 123 MMHG | BODY MASS INDEX: 29.25 KG/M2 | HEIGHT: 74 IN

## 2020-09-08 DIAGNOSIS — M25.521 RIGHT ELBOW PAIN: ICD-10-CM

## 2020-09-08 DIAGNOSIS — M77.8 TRICEPS TENDINITIS: Primary | ICD-10-CM

## 2020-09-08 PROCEDURE — 99213 OFFICE O/P EST LOW 20 MIN: CPT | Performed by: FAMILY MEDICINE
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
## 2020-09-08 NOTE — PATIENT INSTRUCTIONS
Diagnosis: Right Triceps Tendinitis s/p PRP injection  Image Findings: none  Treatment: Start home exercises from PT  Job: no restrictions  Medications: Limited tylenol/ibuprofen for pain for 1-2 weeks  Follow-up: 2 months if better send a message on [employer]    Please call [phone]   Ask for my team if you have any questions or concerns    It was great seeing you again today!    Randolph Cox
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
## 2020-11-07 ENCOUNTER — HEALTH MAINTENANCE LETTER (OUTPATIENT)
Age: 50
End: 2020-11-07

## 2020-11-12 ENCOUNTER — TELEPHONE (OUTPATIENT)
Dept: URGENT CARE | Facility: URGENT CARE | Age: 50
End: 2020-11-12

## 2020-11-12 NOTE — TELEPHONE ENCOUNTER
Called and spoke with patient.  Relayed message from Dr. Cox.  Patient will follow up if he is having persisting or worsening pain.    Zamzam Robertson, ATC

## 2020-11-12 NOTE — TELEPHONE ENCOUNTER
Drew from [facility] in [place] would like a call regarding a script. [phone]

## 2020-11-12 NOTE — TELEPHONE ENCOUNTER
Called and spoke with pharmacist at [facility].  She states they received a prescription for Norco on 8/20/20.  Notes that patient did not fill it in August but is calling today asking for it to be filled. Huddled with Dr. Cox and he would not recommend filling it at this time.  Pharmacist will close it out.    Called and spoke with patient. He notes mild pain after exercising.  States that he is able to tolerate workouts.  I explained that Dr. Cox would not recommend narcotics.  Patient expresses understanding.  I explained that I would pass this message along to Dr. Cox for his recommendations.    Huddled with Dr. Cox. He would advise patient to exercise as tolerated and he can manage symptoms with OTC medications.    Zamzam Robertson, ATC

## 2020-11-25 ENCOUNTER — MYC MEDICAL ADVICE (OUTPATIENT)
Dept: FAMILY MEDICINE | Facility: CLINIC | Age: 50
End: 2020-11-25

## 2020-12-02 ENCOUNTER — VIRTUAL VISIT (OUTPATIENT)
Dept: FAMILY MEDICINE | Facility: CLINIC | Age: 50
End: 2020-12-02
Payer: COMMERCIAL

## 2020-12-02 DIAGNOSIS — R10.13 DYSPEPSIA: Primary | ICD-10-CM

## 2020-12-02 DIAGNOSIS — R19.7 DIARRHEA, UNSPECIFIED TYPE: ICD-10-CM

## 2020-12-02 PROCEDURE — 99213 OFFICE O/P EST LOW 20 MIN: CPT | Mod: TEL | Performed by: FAMILY MEDICINE

## 2020-12-02 NOTE — PROGRESS NOTES
"Bj Philip is a 50 year old male who is being evaluated via a billable telephone visit.      The patient has been notified of following:     \"This telephone visit will be conducted via a call between you and your physician/provider. We have found that certain health care needs can be provided without the need for a physical exam.  This service lets us provide the care you need with a short phone conversation.  If a prescription is necessary we can send it directly to your pharmacy.  If lab work is needed we can place an order for that and you can then stop by our lab to have the test done at a later time.    Telephone visits are billed at different rates depending on your insurance coverage. During this emergency period, for some insurers they may be billed the same as an in-person visit.  Please reach out to your insurance provider with any questions.    If during the course of the call the physician/provider feels a telephone visit is not appropriate, you will not be charged for this service.\"    Patient has given verbal consent for Telephone visit?  Yes    What phone number would you like to be contacted at?  Cell    How would you like to obtain your AVS? Nciholas Carty     jB Philip is a 50 year old male who presents via phone visit today for the following health issues:    HPI     Telephone visit with patient today to discuss ongoing GI issues.  I saw him back in late March following a trip to [place].  He had an incident where he ate a very spicy meal and felt some discomfort and subsequent diarrhea after that.  So we treated it as if it was a traveler's diarrhea.  But symptoms came back and he saw Dr. Singh the following week.  She tested him for H pylori and some additional stool tests, all of which were negative.  The patient says things have not resolved even though it is been about 8 months.  When he does not eat he has a lot of aching in his stomach.  But when he does eat, especially if " there is any spice to it at all, it just goes right through him and he has diarrhea.  Feels as though he is not absorbing things properly.  Omeprazole does seem to help a little bit of the aching.  Denies a lot of reflux.  Weight is stable.  No changes in skin, muscle aches, joint aches.    Review of Systems   Constitutional, HEENT, cardiovascular, pulmonary, gi and gu systems are negative, except as otherwise noted.       Objective          Vitals:  No vitals were obtained today due to virtual visit.    healthy, alert and no distress  PSYCH: Alert and oriented times 3; coherent speech, normal   rate and volume, able to articulate logical thoughts, able   to abstract reason, no tangential thoughts, no hallucinations   or delusions  His affect is normal  RESP: No cough, no audible wheezing, able to talk in full sentences  Remainder of exam unable to be completed due to telephone visits    Past labs reviewed with the patient.         Assessment/Plan:    Assessment & Plan     Dyspepsia  Uncertain what is going on with issues of dyspepsia and diarrhea.  I think he needs more of a work-up to help decide whether we are looking at some type of inflammatory bowel disorder or whether this is a combination of gastritis and a functional bowel disorder.  So I discussed with patient that the most efficient way would be to get him in with her GI specialty team and there will likely be some additional lab work done and very possibly endoscopy and/or colonoscopy.  But this is gone on long enough that it does not seem to be a self-limited issue.  Patient agrees with plan.  In the meantime I had like him to stay on the omeprazole daily simply for symptom relief and acid suppression.  - GASTROENTEROLOGY ADULT REF CONSULT ONLY; Future    Diarrhea, unspecified type  As above  - GASTROENTEROLOGY ADULT REF CONSULT ONLY; Future     BMI:   Estimated body mass index is 29.25 kg/m  as calculated from the following:    Height as of 9/8/20:  "1.88 m (6' 2\").    Weight as of 7/23/20: 103.3 kg (227 lb 12.8 oz).            See Patient Instructions    Return in about 6 weeks (around 1/13/2021) for following consult.    Acacia Petersen MD  [facility]    Phone call duration:  12 minutes               "

## 2021-01-11 ENCOUNTER — VIRTUAL VISIT (OUTPATIENT)
Dept: GASTROENTEROLOGY | Facility: CLINIC | Age: 51
End: 2021-01-11
Attending: FAMILY MEDICINE
Payer: COMMERCIAL

## 2021-01-11 DIAGNOSIS — K21.9 GASTROESOPHAGEAL REFLUX DISEASE, UNSPECIFIED WHETHER ESOPHAGITIS PRESENT: ICD-10-CM

## 2021-01-11 DIAGNOSIS — Z12.11 COLON CANCER SCREENING: Primary | ICD-10-CM

## 2021-01-11 DIAGNOSIS — R19.7 DIARRHEA, UNSPECIFIED TYPE: ICD-10-CM

## 2021-01-11 DIAGNOSIS — R10.13 DYSPEPSIA: ICD-10-CM

## 2021-01-11 PROCEDURE — 99203 OFFICE O/P NEW LOW 30 MIN: CPT | Mod: GT | Performed by: INTERNAL MEDICINE

## 2021-01-11 NOTE — PROGRESS NOTES
HPI:    Amado  presents today for a video visit to discuss longstanding acid reflux.  Used to take zantac as needed which helped but has stopped since it was pulled off the market.  Was initially doing well but early last year he travelled [place] [facility] [place] ate some spicy food and became ill (initially with nausea/vomiting but that has now resolved).  Was given omeprazole by his PCP which seemed to help initially but after awhile he felt like it stopped working.  Is now taking it every other day  - does still have some breakthrough symptoms.    Also having diarrhea - this only happens after eating spicy foods (about once a week) - in between episodes has normal BMs.  No blood in the stool.      Past Medical History:   Diagnosis Date     Pain     left thumb     Seasonal allergies        Past Surgical History:   Procedure Laterality Date     ARTHROPLASTY CARPOMETACARPAL (THUMB JOINT)      LT revision, Dr. Alok Rivera     ARTHROPLASTY CARPOMETACARPAL (THUMB JOINT), ARTHRODESIS, COMBINED  8/4/2011    Procedure:COMBINED ARTHROPLASTY CARPOMETACARPAL (THUMB JOINT), ARTHRODESIS; Metacarpalphalangeal Joint Fusion; Surgeon:ABNER GONZALEZ; [facility]     ARTHROSCOPY SHOULDER RT/LT  8/10    LT decompression of impingement     REPAIR GAMEKEEPER'S THUMB  8/4/2011    Procedure:REPAIR LIGAMENT ULNAR COLLATERAL THUMB (GAMEKEEPER'S); Thumb Radial Reconstruction    ; Surgeon:ABNER GONZALEZ; Location[facility]       Family History   Problem Relation Age of Onset     Anesthesia Reaction No family hx of      Blood Disease No family hx of      Hypertension No family hx of      Cerebrovascular Disease No family hx of      C.A.D. No family hx of      Diabetes No family hx of      Cancer No family hx of        Social History     Tobacco Use     Smoking status: Never Smoker     Smokeless tobacco: Never Used   Substance Use Topics     Alcohol use: No        O:    Gen: no acute distress  HEENT: NCAT  Neck: normal ROM  Resp: nonlabored  breathing  Neuro: no gross deficits  Psych: appropriate mood and affect    Assessment and Plan:    # GERD - some improvement with PPI although effect seemed to be transient - will further assess with EGD.  Can try weaning off omeprazole to famotidine as tolerated.     # intermittent diarrhea - normal BMs inbetween - likely dietary. Recommend avoiding trigger foods    # CRC screening - will do colonoscopy at the time of EGD    RTC after testing as needed    Abiola Amin, DO     Video-Visit Details     Type of service:  Video Visit     Video Start Time: 9:01 AM  Video End Time (time video stopped): 9:17 AM     Originating Location (pt. Location): home     Distant Location (provider location):  [facility]      Mode of Communication:  Video Conference via NextUser

## 2021-01-11 NOTE — PROGRESS NOTES
"Bj Philip is a 50 year old male who is being evaluated via a billable video visit.      The patient has been notified of following:     \"This video visit will be conducted via a call between you and your physician/provider. We have found that certain health care needs can be provided without the need for an in-person physical exam.  This service lets us provide the care you need with a video conversation.  If a prescription is necessary we can send it directly to your pharmacy.  If lab work is needed we can place an order for that and you can then stop by our lab to have the test done at a later time.    If during the course of the call the physician/provider feels a video visit is not appropriate, you will not be charged for this service.\"     Patient has given verbal consent for Video visit? Yes    Patient would like the video invitation sent by: Text to cell phone: [phone]    Video Start Time: Bj Philip complains of    Chief Complaint   Patient presents with     New Patient     Diarrhea unspecified (R19.7)       I have reviewed and updated the patient's Past Medical History, Social History, Family History and Medication List.    ALLERGIES  Seasonal allergies    Additional provider notes:      Assessment/Plan:        Video-Visit Details    Type of service:  Video Visit    Video End Time (time video stopped):     Originating Location (pt. Location):     Distant Location (provider location):  [facility]     Mode of Communication:  Video Conference via Jaylon Andersen MA    "

## 2021-01-11 NOTE — PATIENT INSTRUCTIONS
You can try stopping your omeprazole and switching to pepcid (famotidine 20mg up to twice a day).  Sometimes reflux is worse when you stop omeprazole for 1-2 weeks.  If after 2 weeks your still having daily reflux not controlled by pepcid, you can resume omeprazole daily.    Try to avoid eating foods that trigger you to have diarrhea.    Please call [phone] to schedule an endoscopy (EGD and colonoscopy)

## 2021-01-12 DIAGNOSIS — Z11.59 ENCOUNTER FOR SCREENING FOR OTHER VIRAL DISEASES: Primary | ICD-10-CM

## 2021-01-18 RX ORDER — SODIUM, POTASSIUM,MAG SULFATES 17.5-3.13G
1 SOLUTION, RECONSTITUTED, ORAL ORAL SEE ADMIN INSTRUCTIONS
Qty: 2 BOTTLE | Refills: 0 | Status: SHIPPED | OUTPATIENT
Start: 2021-01-18 | End: 2021-02-23

## 2021-01-18 RX ORDER — BISACODYL 5 MG/1
15 TABLET, DELAYED RELEASE ORAL SEE ADMIN INSTRUCTIONS
Qty: 3 TABLET | Refills: 0 | Status: SHIPPED | OUTPATIENT
Start: 2021-01-18 | End: 2021-02-23

## 2021-01-21 ENCOUNTER — OFFICE VISIT (OUTPATIENT)
Dept: LAB | Facility: CLINIC | Age: 51
End: 2021-01-21
Payer: COMMERCIAL

## 2021-01-21 DIAGNOSIS — Z11.59 ENCOUNTER FOR SCREENING FOR OTHER VIRAL DISEASES: ICD-10-CM

## 2021-01-21 LAB
SARS-COV-2 RNA RESP QL NAA+PROBE: NORMAL
SPECIMEN SOURCE: NORMAL

## 2021-01-21 PROCEDURE — U0003 INFECTIOUS AGENT DETECTION BY NUCLEIC ACID (DNA OR RNA); SEVERE ACUTE RESPIRATORY SYNDROME CORONAVIRUS 2 (SARS-COV-2) (CORONAVIRUS DISEASE [COVID-19]), AMPLIFIED PROBE TECHNIQUE, MAKING USE OF HIGH THROUGHPUT TECHNOLOGIES AS DESCRIBED BY CMS-2020-01-R: HCPCS | Performed by: INTERNAL MEDICINE

## 2021-01-21 PROCEDURE — 99207 PR NO CHARGE LOS: CPT

## 2021-01-21 PROCEDURE — U0005 INFEC AGEN DETEC AMPLI PROBE: HCPCS | Performed by: INTERNAL MEDICINE

## 2021-01-22 LAB
LABORATORY COMMENT REPORT: NORMAL
SARS-COV-2 RNA RESP QL NAA+PROBE: NEGATIVE
SPECIMEN SOURCE: NORMAL

## 2021-01-25 ENCOUNTER — HOSPITAL ENCOUNTER (OUTPATIENT)
Facility: AMBULATORY SURGERY CENTER | Age: 51
Discharge: HOME | End: 2021-01-25
Attending: INTERNAL MEDICINE | Admitting: INTERNAL MEDICINE
Payer: COMMERCIAL

## 2021-01-25 VITALS
RESPIRATION RATE: 16 BRPM | DIASTOLIC BLOOD PRESSURE: 79 MMHG | TEMPERATURE: 98.2 F | HEART RATE: 80 BPM | SYSTOLIC BLOOD PRESSURE: 118 MMHG | OXYGEN SATURATION: 97 %

## 2021-01-25 DIAGNOSIS — Z12.11 SCREENING FOR COLON CANCER: Primary | ICD-10-CM

## 2021-01-25 LAB
COLONOSCOPY: NORMAL
UPPER GI ENDOSCOPY: NORMAL

## 2021-01-25 PROCEDURE — 43239 EGD BIOPSY SINGLE/MULTIPLE: CPT

## 2021-01-25 PROCEDURE — 45380 COLONOSCOPY AND BIOPSY: CPT

## 2021-01-25 PROCEDURE — 88305 TISSUE EXAM BY PATHOLOGIST: CPT | Mod: GC | Performed by: PATHOLOGY

## 2021-01-25 PROCEDURE — G8918 PT W/O PREOP ORDER IV AB PRO: HCPCS

## 2021-01-25 PROCEDURE — G8907 PT DOC NO EVENTS ON DISCHARG: HCPCS

## 2021-01-25 RX ORDER — NALOXONE HYDROCHLORIDE 0.4 MG/ML
0.2 INJECTION, SOLUTION INTRAMUSCULAR; INTRAVENOUS; SUBCUTANEOUS
Status: DISCONTINUED | OUTPATIENT
Start: 2021-01-25 | End: 2021-01-26 | Stop reason: HOSPADM

## 2021-01-25 RX ORDER — NALOXONE HYDROCHLORIDE 0.4 MG/ML
0.4 INJECTION, SOLUTION INTRAMUSCULAR; INTRAVENOUS; SUBCUTANEOUS
Status: DISCONTINUED | OUTPATIENT
Start: 2021-01-25 | End: 2021-01-26 | Stop reason: HOSPADM

## 2021-01-25 RX ORDER — ONDANSETRON 2 MG/ML
4 INJECTION INTRAMUSCULAR; INTRAVENOUS
Status: DISCONTINUED | OUTPATIENT
Start: 2021-01-25 | End: 2021-01-26 | Stop reason: HOSPADM

## 2021-01-25 RX ORDER — LIDOCAINE 40 MG/G
CREAM TOPICAL
Status: DISCONTINUED | OUTPATIENT
Start: 2021-01-25 | End: 2021-01-26 | Stop reason: HOSPADM

## 2021-01-25 RX ORDER — PROCHLORPERAZINE MALEATE 10 MG
10 TABLET ORAL EVERY 6 HOURS PRN
Status: DISCONTINUED | OUTPATIENT
Start: 2021-01-25 | End: 2021-01-26 | Stop reason: HOSPADM

## 2021-01-25 RX ORDER — ONDANSETRON 4 MG/1
4 TABLET, ORALLY DISINTEGRATING ORAL EVERY 6 HOURS PRN
Status: DISCONTINUED | OUTPATIENT
Start: 2021-01-25 | End: 2021-01-26 | Stop reason: HOSPADM

## 2021-01-25 RX ORDER — ONDANSETRON 2 MG/ML
4 INJECTION INTRAMUSCULAR; INTRAVENOUS EVERY 6 HOURS PRN
Status: DISCONTINUED | OUTPATIENT
Start: 2021-01-25 | End: 2021-01-26 | Stop reason: HOSPADM

## 2021-01-25 RX ORDER — FENTANYL CITRATE 50 UG/ML
INJECTION, SOLUTION INTRAMUSCULAR; INTRAVENOUS PRN
Status: DISCONTINUED | OUTPATIENT
Start: 2021-01-25 | End: 2021-01-25 | Stop reason: HOSPADM

## 2021-01-25 RX ORDER — FLUMAZENIL 0.1 MG/ML
0.2 INJECTION, SOLUTION INTRAVENOUS
Status: DISCONTINUED | OUTPATIENT
Start: 2021-01-25 | End: 2021-01-26 | Stop reason: HOSPADM

## 2021-01-27 LAB — COPATH REPORT: NORMAL

## 2021-02-23 ENCOUNTER — OFFICE VISIT (OUTPATIENT)
Dept: FAMILY MEDICINE | Facility: CLINIC | Age: 51
End: 2021-02-23
Payer: COMMERCIAL

## 2021-02-23 VITALS
WEIGHT: 227.5 LBS | OXYGEN SATURATION: 100 % | HEART RATE: 82 BPM | HEIGHT: 76 IN | BODY MASS INDEX: 27.7 KG/M2 | DIASTOLIC BLOOD PRESSURE: 68 MMHG | SYSTOLIC BLOOD PRESSURE: 117 MMHG | TEMPERATURE: 98.7 F

## 2021-02-23 DIAGNOSIS — S46.219A BICEPS STRAIN, UNSPECIFIED LATERALITY, INITIAL ENCOUNTER: ICD-10-CM

## 2021-02-23 DIAGNOSIS — Z13.220 SCREENING CHOLESTEROL LEVEL: ICD-10-CM

## 2021-02-23 DIAGNOSIS — Z71.84 TRAVEL ADVICE ENCOUNTER: ICD-10-CM

## 2021-02-23 DIAGNOSIS — R68.82 DECREASED LIBIDO: ICD-10-CM

## 2021-02-23 DIAGNOSIS — Z00.00 ROUTINE GENERAL MEDICAL EXAMINATION AT A HEALTH CARE FACILITY: Primary | ICD-10-CM

## 2021-02-23 DIAGNOSIS — Z12.5 PROSTATE CANCER SCREENING: ICD-10-CM

## 2021-02-23 DIAGNOSIS — Z13.1 DIABETES MELLITUS SCREENING: ICD-10-CM

## 2021-02-23 PROCEDURE — 99000 SPECIMEN HANDLING OFFICE-LAB: CPT | Performed by: FAMILY MEDICINE

## 2021-02-23 PROCEDURE — 82947 ASSAY GLUCOSE BLOOD QUANT: CPT | Performed by: FAMILY MEDICINE

## 2021-02-23 PROCEDURE — 80061 LIPID PANEL: CPT | Performed by: FAMILY MEDICINE

## 2021-02-23 PROCEDURE — 84270 ASSAY OF SEX HORMONE GLOBUL: CPT | Performed by: FAMILY MEDICINE

## 2021-02-23 PROCEDURE — 99396 PREV VISIT EST AGE 40-64: CPT | Performed by: FAMILY MEDICINE

## 2021-02-23 PROCEDURE — 36415 COLL VENOUS BLD VENIPUNCTURE: CPT | Performed by: FAMILY MEDICINE

## 2021-02-23 PROCEDURE — 99213 OFFICE O/P EST LOW 20 MIN: CPT | Mod: 25 | Performed by: FAMILY MEDICINE

## 2021-02-23 PROCEDURE — 84403 ASSAY OF TOTAL TESTOSTERONE: CPT | Mod: 90 | Performed by: FAMILY MEDICINE

## 2021-02-23 PROCEDURE — G0103 PSA SCREENING: HCPCS | Performed by: FAMILY MEDICINE

## 2021-02-23 RX ORDER — MEFLOQUINE HYDROCHLORIDE 250 MG/1
250 TABLET ORAL
Qty: 8 TABLET | Refills: 0 | Status: SHIPPED | OUTPATIENT
Start: 2021-02-23 | End: 2021-04-26

## 2021-02-23 ASSESSMENT — MIFFLIN-ST. JEOR: SCORE: 2003.43

## 2021-02-23 ASSESSMENT — PAIN SCALES - GENERAL: PAINLEVEL: NO PAIN (0)

## 2021-02-23 NOTE — PROGRESS NOTES
3  SUBJECTIVE:   CC: Bj Philip is an 50 year old male who presents for preventive health visit.       Patient has been advised of split billing requirements and indicates understanding: Yes  Healthy Habits:    Do you get at least three servings of calcium containing foods daily (dairy, green leafy vegetables, etc.)? no, taking calcium and/or vitamin D supplement: no    Amount of exercise or daily activities, outside of work: 4-5 day(s) per week    Problems taking medications regularly not applicable    Medication side effects: No    Have you had an eye exam in the past two years? no    Do you see a dentist twice per year? yes    Do you have sleep apnea, excessive snoring or daytime drowsiness?yes      Arm pain for past 3  Weeks tried OTC meds still hurts     Today's PHQ-2 Score:   PHQ-2 ( 1999 Pf[employer]) 2/23/2021 1/11/2021   Q1: Little interest or pleasure in doing things 0 0   Q2: Feeling down, depressed or hopeless 0 0   PHQ-2 Score 0 0       Abuse: Current or Past(Physical, Sexual or Emotional)- No  Do you feel safe in your environment? Yes    Have you ever done Advance Care Planning? (For example, a Health Directive, POLST, or a discussion with a medical provider or your loved ones about your wishes):     Social History     Tobacco Use     Smoking status: Never Smoker     Smokeless tobacco: Never Used   Substance Use Topics     Alcohol use: No     If you drink alcohol do you typically have >3 drinks per day or >7 drinks per week? No                      Last PSA: No results found for: PSA    Reviewed orders with patient. Reviewed health maintenance and updated orders accordingly - Yes  Lab work is in process  Labs reviewed in EPIC  BP Readings from Last 3 Encounters:   02/23/21 117/68   01/25/21 118/79   09/08/20 123/83    Wt Readings from Last 3 Encounters:   02/23/21 103.2 kg (227 lb 8 oz)   07/23/20 103.3 kg (227 lb 12.8 oz)   04/23/20 102.1 kg (225 lb)                    Reviewed and updated as needed  "this visit by clinical staff  Tobacco  Allergies  Meds   Med Hx  Surg Hx  Fam Hx  Soc Hx        Reviewed and updated as needed this visit by Provider                Here today for routine wellness exam.  Patient also has some separately identifiable issues to discuss, specifically:   1) has been noting some decreased libido.  Remote about intermittent erectile dysfunction but that seems to be doing okay.  Has a friend with low testosterone and is wondering about checking his.  2) traveling to [place] in a couple of weeks for 3-week trip.  Needs a Covid test and malaria medication  3) has been doing some lifting and arm wrestling and his biceps especially his right are quite sore.    ROS:  CONSTITUTIONAL: NEGATIVE for fever, chills, change in weight  INTEGUMENTARY/SKIN: NEGATIVE for worrisome rashes, moles or lesions  EYES: NEGATIVE for vision changes or irritation  ENT: NEGATIVE for ear, mouth and throat problems  RESP: NEGATIVE for significant cough or SOB  CV: NEGATIVE for chest pain, palpitations or peripheral edema  GI: NEGATIVE for nausea, abdominal pain, heartburn, or change in bowel habits   male: negative for dysuria, hematuria, decreased urinary stream, erectile dysfunction, urethral discharge  MUSCULOSKELETAL: NEGATIVE for significant arthralgias or myalgia  NEURO: NEGATIVE for weakness, dizziness or paresthesias  PSYCHIATRIC: NEGATIVE for changes in mood or affect    OBJECTIVE:   /68 (BP Location: Left arm, Patient Position: Chair, Cuff Size: Adult Regular)   Pulse 82   Temp 98.7  F (37.1  C) (Tympanic)   Ht 1.93 m (6' 4\")   Wt 103.2 kg (227 lb 8 oz)   SpO2 100%   BMI 27.69 kg/m    EXAM:  GENERAL: healthy, alert and no distress  EYES: Eyes grossly normal to inspection, PERRL and conjunctivae and sclerae normal  HENT: ear canals and TM's normal, nose and mouth without ulcers or lesions  NECK: no adenopathy, no asymmetry, masses, or scars and thyroid normal to palpation  RESP: lungs clear " to auscultation - no rales, rhonchi or wheezes  CV: regular rate and rhythm, normal S1 S2, no S3 or S4, no murmur, click or rub, no peripheral edema and peripheral pulses strong  ABDOMEN: soft, nontender, no hepatosplenomegaly, no masses and bowel sounds normal  MS: no gross musculoskeletal defects noted, no edema  SKIN: no suspicious lesions or rashes  NEURO: Normal strength and tone, mentation intact and speech normal  PSYCH: mentation appears normal, affect normal/bright    Diagnostic Test Results:  Labs reviewed in Epic    ASSESSMENT/PLAN:   1. Routine general medical examination at a health care facility  Discussed routine health maintenance    2. Decreased libido  We will take a look at his testosterone level and follow-up accordingly  - Testosterone Free and Total  - OFFICE/OUTPT VISIT,EST,LEVL III    3. Travel advice encounter  Covid test ordered and prescription sent  - mefloquine (LARIAM) 250 MG tablet; Take 1 tablet (250 mg) by mouth every 7 days Continue 4 weeks after return  Dispense: 8 tablet; Refill: 0  - Asymptomatic COVID-19 Virus (Coronavirus) by PCR; Future  - OFFICE/OUTPT VISIT,EST,LEVL III    4. Biceps strain, unspecified laterality, initial encounter  At this time I do not necessarily suspect any type of tear.  I think if she is sore so we discussed some light continuous resistance to help work through this    5. Screening cholesterol level    - Lipid panel reflex to direct LDL Non-fasting    6. Diabetes mellitus screening    - Glucose    7. Prostate cancer screening    - Prostate spec antigen screen    Patient has been advised of split billing requirements and indicates understanding: Yes  COUNSELING:  Reviewed preventive health counseling, as reflected in patient instructions       Regular exercise       Healthy diet/nutrition       Vision screening       Hearing screening       Prostate cancer screening    Estimated body mass index is 27.69 kg/m  as calculated from the following:    Height  "as of this encounter: 1.93 m (6' 4\").    Weight as of this encounter: 103.2 kg (227 lb 8 oz).        He reports that he has never smoked. He has never used smokeless tobacco.      Counseling Resources:  ATP IV Guidelines  Pooled Cohorts Equation Calculator  FRAX Risk Assessment  ICSI Preventive Guidelines  Dietary Guidelines for Americans, 2010  USDA's MyPlate  ASA Prophylaxis  Lung CA Screening    Acacia Petersen MD  [facility]  "

## 2021-02-23 NOTE — PATIENT INSTRUCTIONS
At [facility], we strive to deliver an exceptional experience to you, every time we see you. If you receive a survey, please complete it as we do value your feedback.  If you have MyChart, you can expect to receive results automatically within 24 hours of their completion.  Your provider will send a note interpreting your results as well.   If you do not have MyChart, you should receive your results in about a week by mail.    Your care team:                            Family Medicine Internal Medicine   MD Bruno Huang MD Shantel Branch-Fleming, MD Srinivasa Vaka, MD Katya Belousova, PABetteC  Niharika Sanders, APRN CNP    Ata Prajapati, MD Pediatrics   Janusz Lyle, PABARBER Villagomez, CNP MD Lynnette Aguilar APRN CNP   MD Angelina Vanegas MD Deborah Mielke, MD Jennifer Gates, APRN [facility]      Clinic hours: Monday - Thursday 7 am-6 pm; Fridays 7 am-5 pm.   Urgent care: Monday - Friday 11 am-9 pm; Saturday and Sunday 9 am-5 pm.    Clinic: [phone]       [facility]: Monday - Thursday 8 am - 7 pm; Friday 8 am - 6 pm  [facility]: [phone]     Use [email or web address] for 24/7 diagnosis and treatment of dozens of conditions.  Preventive Health Recommendations  Male Ages 40 to 49    Yearly exam:             See your health care provider every year in order to  o   Review health changes.   o   Discuss preventive care.    o   Review your medicines if your doctor has prescribed any.    You should be tested each year for STDs (sexually transmitted diseases) if you re at risk.     Have a cholesterol test every 5 years.     Have a colonoscopy (test for colon cancer) if someone in your family has had colon cancer or polyps before age 50.     After age 45, have a diabetes test (fasting glucose). If you are at risk for diabetes, you should have this test every 3 years.      Talk with your  "health care provider about whether or not a prostate cancer screening test (PSA) is right for you.    Shots: Get a flu shot each year. Get a tetanus shot every 10 years.     Nutrition:    Eat at least 5 servings of fruits and vegetables daily.     Eat whole-grain bread, whole-wheat pasta and brown rice instead of white grains and rice.     Get adequate Calcium and Vitamin D.     Lifestyle    Exercise for at least 150 minutes a week (30 minutes a day, 5 days a week). This will help you control your weight and prevent disease.     Limit alcohol to one drink per day.     No smoking.     Wear sunscreen to prevent skin cancer.     See your dentist every six months for an exam and cleaning.    --------------------------------------------------    - Voltaren gel 1-2 times daily  - gentle exercise - light weights or bands  - check for any \"irregular\" look to the biceps  "

## 2021-02-24 LAB
CHOLEST SERPL-MCNC: 144 MG/DL
GLUCOSE SERPL-MCNC: 97 MG/DL (ref 70–99)
HDLC SERPL-MCNC: 39 MG/DL
LDLC SERPL CALC-MCNC: 82 MG/DL
NONHDLC SERPL-MCNC: 105 MG/DL
PSA SERPL-ACNC: 0.98 UG/L (ref 0–4)
TRIGL SERPL-MCNC: 115 MG/DL

## 2021-02-26 LAB
SHBG SERPL-SCNC: 28 NMOL/L (ref 11–80)
TESTOST FREE SERPL-MCNC: 6.26 NG/DL (ref 4.7–24.4)
TESTOST SERPL-MCNC: 291 NG/DL (ref 240–950)

## 2021-02-26 NOTE — RESULT ENCOUNTER NOTE
Amado,  Your lab work actually looks just fine.  The testosterone level is within the normal range.  Prostate test is fine.  Cholesterol and sugar look great.  So nothing that explains those symptoms, but nothing wrong either.  JUAN F Petersen M.D.

## 2021-02-27 ENCOUNTER — MYC MEDICAL ADVICE (OUTPATIENT)
Dept: FAMILY MEDICINE | Facility: CLINIC | Age: 51
End: 2021-02-27

## 2021-03-01 ENCOUNTER — MYC MEDICAL ADVICE (OUTPATIENT)
Dept: FAMILY MEDICINE | Facility: CLINIC | Age: 51
End: 2021-03-01

## 2021-03-01 DIAGNOSIS — R19.7 DIARRHEA, UNSPECIFIED TYPE: ICD-10-CM

## 2021-03-01 RX ORDER — CIPROFLOXACIN 500 MG/1
500 TABLET, FILM COATED ORAL 2 TIMES DAILY
Qty: 14 TABLET | Refills: 0 | Status: SHIPPED | OUTPATIENT
Start: 2021-03-01 | End: 2021-12-24

## 2021-03-02 ENCOUNTER — OFFICE VISIT (OUTPATIENT)
Dept: URGENT CARE | Facility: URGENT CARE | Age: 51
End: 2021-03-02
Attending: FAMILY MEDICINE
Payer: COMMERCIAL

## 2021-03-02 DIAGNOSIS — Z71.84 TRAVEL ADVICE ENCOUNTER: ICD-10-CM

## 2021-03-02 LAB
SARS-COV-2 RNA RESP QL NAA+PROBE: NORMAL
SPECIMEN SOURCE: NORMAL

## 2021-03-02 PROCEDURE — U0005 INFEC AGEN DETEC AMPLI PROBE: HCPCS | Performed by: FAMILY MEDICINE

## 2021-03-02 PROCEDURE — U0003 INFECTIOUS AGENT DETECTION BY NUCLEIC ACID (DNA OR RNA); SEVERE ACUTE RESPIRATORY SYNDROME CORONAVIRUS 2 (SARS-COV-2) (CORONAVIRUS DISEASE [COVID-19]), AMPLIFIED PROBE TECHNIQUE, MAKING USE OF HIGH THROUGHPUT TECHNOLOGIES AS DESCRIBED BY CMS-2020-01-R: HCPCS | Performed by: FAMILY MEDICINE

## 2021-04-26 DIAGNOSIS — Z71.84 TRAVEL ADVICE ENCOUNTER: ICD-10-CM

## 2021-04-26 RX ORDER — MEFLOQUINE HYDROCHLORIDE 250 MG/1
TABLET ORAL
Qty: 8 TABLET | Refills: 0 | Status: SHIPPED | OUTPATIENT
Start: 2021-04-26 | End: 2021-12-24

## 2021-04-26 NOTE — TELEPHONE ENCOUNTER
Routing refill request to provider for review/approval because:  Drug not on the [facility] refill protocol     Pauline COOMBSN, RN

## 2021-06-28 DIAGNOSIS — Z71.84 TRAVEL ADVICE ENCOUNTER: ICD-10-CM

## 2021-06-28 NOTE — TELEPHONE ENCOUNTER
Patient needs appointment for refill of this medication. Please help him set this up. Virtual is okay.  Thank you,  ARPIT Barlow, NP-C  [facility]

## 2021-07-13 RX ORDER — MEFLOQUINE HYDROCHLORIDE 250 MG/1
TABLET ORAL
Qty: 8 TABLET | Refills: 0 | OUTPATIENT
Start: 2021-07-13

## 2021-09-05 ENCOUNTER — HEALTH MAINTENANCE LETTER (OUTPATIENT)
Age: 51
End: 2021-09-05

## 2021-09-07 NOTE — TELEPHONE ENCOUNTER
No longer needs medication.  ARPIT Barlow, NP-C  [facility]     normal sinus rhythm

## 2021-12-22 ENCOUNTER — E-VISIT (OUTPATIENT)
Dept: FAMILY MEDICINE | Facility: CLINIC | Age: 51
End: 2021-12-22
Payer: COMMERCIAL

## 2021-12-22 DIAGNOSIS — M62.830 BACK MUSCLE SPASM: Primary | ICD-10-CM

## 2021-12-22 PROCEDURE — 99421 OL DIG E/M SVC 5-10 MIN: CPT | Performed by: FAMILY MEDICINE

## 2021-12-24 RX ORDER — METHOCARBAMOL 500 MG/1
500 TABLET, FILM COATED ORAL 4 TIMES DAILY PRN
Qty: 30 TABLET | Refills: 0 | Status: SHIPPED | OUTPATIENT
Start: 2021-12-24 | End: 2024-01-15

## 2022-02-17 PROBLEM — S52.021G: Status: RESOLVED | Noted: 2019-03-13 | Resolved: 2019-04-29

## 2022-04-08 ENCOUNTER — TRANSFERRED RECORDS (OUTPATIENT)
Dept: HEALTH INFORMATION MANAGEMENT | Facility: CLINIC | Age: 52
End: 2022-04-08
Payer: COMMERCIAL

## 2022-04-17 ENCOUNTER — HEALTH MAINTENANCE LETTER (OUTPATIENT)
Age: 52
End: 2022-04-17

## 2022-04-26 ENCOUNTER — TRANSFERRED RECORDS (OUTPATIENT)
Dept: HEALTH INFORMATION MANAGEMENT | Facility: CLINIC | Age: 52
End: 2022-04-26
Payer: COMMERCIAL

## 2022-04-27 ENCOUNTER — THERAPY VISIT (OUTPATIENT)
Dept: PHYSICAL THERAPY | Facility: CLINIC | Age: 52
End: 2022-04-27
Payer: OTHER MISCELLANEOUS

## 2022-04-27 DIAGNOSIS — M25.512 ACUTE PAIN OF LEFT SHOULDER: Primary | ICD-10-CM

## 2022-04-27 PROCEDURE — 97112 NEUROMUSCULAR REEDUCATION: CPT | Mod: GP | Performed by: PHYSICAL THERAPIST

## 2022-04-27 PROCEDURE — 97161 PT EVAL LOW COMPLEX 20 MIN: CPT | Mod: GP | Performed by: PHYSICAL THERAPIST

## 2022-04-27 PROCEDURE — 97110 THERAPEUTIC EXERCISES: CPT | Mod: GP | Performed by: PHYSICAL THERAPIST

## 2022-04-27 NOTE — PROGRESS NOTES
Subjective:  Pt has had ongoing L shoulder pain since 2010. He has had 2 surgeries with Dr. Branham in 2010 and 2017 (SAD, and a bedtime and capsular plication). He most recently had been to PT in May of 2018 and reports for the most part his shoulder was tolerable with activity modifications. He was slowly starting to get more pain at night and lose strength with overhead movements that he noticed was worsening over the last several months. 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 He went to the ER and they gave him meds and did an x-ray. He went to see Debra Benjamin after not improving, and she gave him a toradol injection on 4-8-22 and it helped take the edge off a bit but it is still pretty painful. She returned to see Debra on 4-26 and she is recommending PT and a PRP injection/consult.     The history is provided by the patient. No  was used.   Therapist Generated HPI Evaluation         Type of problem:  Left shoulder.    This is a new condition.  Condition occurred with:  Repetition/overuse and lifting.  Where condition occurred: at work.  Patient reports pain:  Anterior and in the joint (deep at the acj).  Pain is described as aching and burning and is intermittent.  Pain radiates to:  Cervical. Pain is worse in the A.M..  Since onset symptoms are unchanged.  Associated symptoms:  Loss of motion/stiffness, catching, loss of strength and painful arc. Symptoms are exacerbated by lying on extremity, using arm at shoulder level, using arm overhead and using arm behind back  and relieved by heat.  Special tests included:  X-ray.  Previous treatment includes physical therapy. There was moderate improvement following previous treatment.  Restrictions due to condition include:  Currently not working due to present treatment.  Barriers include:  None as reported by  patient.    Patient Health History         Pain is reported as 1/10 (can get up to a 9/10) on pain scale.  General health as reported by patient is good.  Pertinent medical history includes: none.   Red flags:  None as reported by patient.     Surgeries include:  Orthopedic surgery.    Current medications:  None.    Current occupation .   Primary job tasks include:  Prolonged standing, pushing/pulling, driving and operating a machine/assembly.                  Physical Exam                    Objective:  System                   Shoulder Evaluation:  ROM:  AROM:    Flexion:  Left:  103 (125 with wand)    Right:  165  Extension: Left: 57 with wand  Abduction:  Left: 75 (89 with wand)   Right:  165      External Rotation:  Left:  35 (51 in 4-corner)    Right:  75            Extension/Internal Rotation:  Left:  L4    Right:  T7            Stability Testing:  not assessed      Special Tests:    Left shoulder positive for the following special tests:  Impingement and Acromioclavicular    Palpation:    Left shoulder tenderness present at:  Clavicle; Levator; Rhomboids and Upper Trap    Mobility Tests:  not assessed                                                 General     ROS    Assessment/Plan:    Patient is a 51 year old male with left side shoulder complaints.    Patient has the following significant findings with corresponding treatment plan.                Diagnosis 1:  L shoulder  Pain -  manual therapy, self management, education, directional preference exercise and home program  Decreased ROM/flexibility - manual therapy and therapeutic exercise  Decreased joint mobility - manual therapy and therapeutic exercise  Decreased strength - therapeutic exercise and therapeutic activities    Therapy Evaluation Codes:     1) Clinical presentation characteristics are:   Stable/Uncomplicated.  2) Decision-Making    Low complexity using standardized patient assessment instrument and/or measureable assessment of  functional outcome.  Cumulative Therapy Evaluation is: Low complexity.    Previous and current functional limitations:  (See Goal Flow Sheet for this information)    Short term and Long term goals: (See Goal Flow Sheet for this information)     Communication ability:  Patient appears to be able to clearly communicate and understand verbal and written communication and follow directions correctly.  Treatment Explanation - The following has been discussed with the patient:   RX ordered/plan of care  Anticipated outcomes  Possible risks and side effects  This patient would benefit from PT intervention to resume normal activities.   Rehab potential is good.    Frequency:  1 X week, once daily  Duration:  for 6 weeks  Discharge Plan:  Achieve all LTG.  Independent in home treatment program.  Reach maximal therapeutic benefit.    Please refer to the daily flowsheet for treatment today, total treatment time and time spent performing 1:1 timed codes.

## 2022-05-04 ENCOUNTER — THERAPY VISIT (OUTPATIENT)
Dept: PHYSICAL THERAPY | Facility: CLINIC | Age: 52
End: 2022-05-04
Payer: OTHER MISCELLANEOUS

## 2022-05-04 DIAGNOSIS — M25.512 ACUTE PAIN OF LEFT SHOULDER: Primary | ICD-10-CM

## 2022-05-04 PROCEDURE — 97110 THERAPEUTIC EXERCISES: CPT | Mod: GP | Performed by: PHYSICAL THERAPIST

## 2022-05-04 PROCEDURE — 97140 MANUAL THERAPY 1/> REGIONS: CPT | Mod: GP | Performed by: PHYSICAL THERAPIST

## 2022-05-04 PROCEDURE — 97112 NEUROMUSCULAR REEDUCATION: CPT | Mod: GP | Performed by: PHYSICAL THERAPIST

## 2022-05-04 NOTE — PROGRESS NOTES
Subjective:  HPI  Physical Exam                    Objective:  System    Physical Exam    General     ROS    Assessment/Plan:    SUBJECTIVE  Subjective changes as noted by pt:  He reports he is doing well--getting better--still a lot of clicking       Current pain level: 2/10     Changes in function:  Yes (See Goal flowsheet attached for changes in current functional level)     Adverse reaction to treatment or activity:  None    OBJECTIVE  Changes in objective findings:  Yes,     L shoulder AAROM:    Wand flexion standing= 131  ABD= 120  EXT=60  ER on wall=  52    ASSESSMENT  Abdullai continues to require intervention to meet STG and LTG's: PT  Patient's symptoms are resolving.  Patient is progressing as expected.  Response to therapy has shown an improvement in  pain level, ROM  and flexibility  Progress made towards STG/LTG?  Yes (See Goal flowsheet attached for updates on achievement of STG and LTG)    PLAN  Current treatment program is being advanced to more complex exercises.    PTA/ATC plan:  N/A    Please refer to the daily flowsheet for treatment today, total treatment time and time spent performing 1:1 timed codes.

## 2022-05-11 ENCOUNTER — THERAPY VISIT (OUTPATIENT)
Dept: PHYSICAL THERAPY | Facility: CLINIC | Age: 52
End: 2022-05-11
Payer: OTHER MISCELLANEOUS

## 2022-05-11 DIAGNOSIS — M25.512 ACUTE PAIN OF LEFT SHOULDER: Primary | ICD-10-CM

## 2022-05-11 PROCEDURE — 97112 NEUROMUSCULAR REEDUCATION: CPT | Mod: GP | Performed by: PHYSICAL THERAPIST

## 2022-05-11 PROCEDURE — 97110 THERAPEUTIC EXERCISES: CPT | Mod: GP | Performed by: PHYSICAL THERAPIST

## 2022-05-11 NOTE — PROGRESS NOTES
Subjective:  HPI  Physical Exam                    Objective:  System    Physical Exam    General     ROS    Assessment/Plan:    SUBJECTIVE  Subjective changes as noted by pt:  Overall doing a little better--feels like his motion has improved some but still pretty pinching.        Current pain level: 3/10     Changes in function:  Yes (See Goal flowsheet attached for changes in current functional level)     Adverse reaction to treatment or activity:  None    OBJECTIVE  Changes in objective findings:  Yes,     L shoulder AROM: 125/122 in scaption and 108 in straight ABD    L shoulder AAROM:  Wand flexion standing=140  ABD=142      UT pain was abolished after repeated retraction with extension in sitting        ASSESSMENT  Abdullai continues to require intervention to meet STG and LTG's: PT  Patient's symptoms are resolving.  Patient is progressing as expected.  Response to therapy has shown an improvement in  pain level, ROM  and flexibility  Progress made towards STG/LTG?  Yes (See Goal flowsheet attached for updates on achievement of STG and LTG)    PLAN  Current treatment program is being advanced to more complex exercises.    PTA/ATC plan:  N/A    Please refer to the daily flowsheet for treatment today, total treatment time and time spent performing 1:1 timed codes.

## 2022-06-17 ENCOUNTER — TRANSFERRED RECORDS (OUTPATIENT)
Dept: HEALTH INFORMATION MANAGEMENT | Facility: CLINIC | Age: 52
End: 2022-06-17

## 2022-06-22 ENCOUNTER — THERAPY VISIT (OUTPATIENT)
Dept: PHYSICAL THERAPY | Facility: CLINIC | Age: 52
End: 2022-06-22
Payer: OTHER MISCELLANEOUS

## 2022-06-22 DIAGNOSIS — M25.512 ACUTE PAIN OF LEFT SHOULDER: Primary | ICD-10-CM

## 2022-06-22 PROCEDURE — 97112 NEUROMUSCULAR REEDUCATION: CPT | Mod: GP | Performed by: PHYSICAL THERAPIST

## 2022-06-22 PROCEDURE — 97140 MANUAL THERAPY 1/> REGIONS: CPT | Mod: GP | Performed by: PHYSICAL THERAPIST

## 2022-06-22 PROCEDURE — 97110 THERAPEUTIC EXERCISES: CPT | Mod: GP | Performed by: PHYSICAL THERAPIST

## 2022-06-22 NOTE — PROGRESS NOTES
"Subjective:  HPI  Physical Exam                    Objective:  System    Physical Exam    General     ROS    Assessment/Plan:    SUBJECTIVE  Subjective changes as noted by pt:  He had a PRP injection on 5-31-22 and he hasn't been back since. He feels like it's better and he has laid low the last 3 weeks. He feels like his motion is a little better but the catching feels better. He has pain \"here or there\" especially if he \"sleeps wrong\".         Current pain level: 2/10     Changes in function:  Yes (See Goal flowsheet attached for changes in current functional level)     Adverse reaction to treatment or activity:  None    OBJECTIVE  Changes in objective findings:  Yes,     L shoulder AAROM:  Wand flexion standing= 146  ABD= 147    L shoulder AROM:132/110    ABD after retraction/extension =143    Hypertonicity noted along UT/LS and rhomboids today.    Pt required cuing not to hike UT with cervical retraction/ext and with supine SA punch.         ASSESSMENT  Abdullai continues to require intervention to meet STG and LTG's: PT  Patient's symptoms are resolving.  Patient is progressing as expected.  Response to therapy has shown an improvement in  pain level, ROM  and flexibility  Progress made towards STG/LTG?  Yes (See Goal flowsheet attached for updates on achievement of STG and LTG)    PLAN  Current treatment program is being advanced to more complex exercises.    PTA/ATC plan:  N/A    Please refer to the daily flowsheet for treatment today, total treatment time and time spent performing 1:1 timed codes.        "

## 2022-07-13 ENCOUNTER — OFFICE VISIT (OUTPATIENT)
Dept: FAMILY MEDICINE | Facility: CLINIC | Age: 52
End: 2022-07-13
Payer: COMMERCIAL

## 2022-07-13 VITALS
SYSTOLIC BLOOD PRESSURE: 117 MMHG | TEMPERATURE: 99.3 F | DIASTOLIC BLOOD PRESSURE: 70 MMHG | HEIGHT: 75 IN | RESPIRATION RATE: 18 BRPM | OXYGEN SATURATION: 97 % | BODY MASS INDEX: 27.52 KG/M2 | WEIGHT: 221.3 LBS | HEART RATE: 70 BPM

## 2022-07-13 DIAGNOSIS — Z13.1 DIABETES MELLITUS SCREENING: ICD-10-CM

## 2022-07-13 DIAGNOSIS — Z13.220 SCREENING CHOLESTEROL LEVEL: ICD-10-CM

## 2022-07-13 DIAGNOSIS — Z00.00 ROUTINE GENERAL MEDICAL EXAMINATION AT A HEALTH CARE FACILITY: Primary | ICD-10-CM

## 2022-07-13 DIAGNOSIS — Z12.5 PROSTATE CANCER SCREENING: ICD-10-CM

## 2022-07-13 DIAGNOSIS — J30.2 SEASONAL ALLERGIES: ICD-10-CM

## 2022-07-13 PROCEDURE — 80061 LIPID PANEL: CPT | Performed by: FAMILY MEDICINE

## 2022-07-13 PROCEDURE — G0103 PSA SCREENING: HCPCS | Performed by: FAMILY MEDICINE

## 2022-07-13 PROCEDURE — 36415 COLL VENOUS BLD VENIPUNCTURE: CPT | Performed by: FAMILY MEDICINE

## 2022-07-13 PROCEDURE — 82947 ASSAY GLUCOSE BLOOD QUANT: CPT | Performed by: FAMILY MEDICINE

## 2022-07-13 PROCEDURE — 99396 PREV VISIT EST AGE 40-64: CPT | Performed by: FAMILY MEDICINE

## 2022-07-13 RX ORDER — OLOPATADINE HYDROCHLORIDE 2 MG/ML
1 SOLUTION/ DROPS OPHTHALMIC DAILY
Qty: 2.5 ML | Refills: 5 | Status: SHIPPED | OUTPATIENT
Start: 2022-07-13 | End: 2023-09-11

## 2022-07-13 RX ORDER — CETIRIZINE HYDROCHLORIDE 10 MG/1
10 TABLET ORAL DAILY PRN
Qty: 30 TABLET | Refills: 2 | Status: SHIPPED | OUTPATIENT
Start: 2022-07-13 | End: 2023-09-11

## 2022-07-13 ASSESSMENT — ENCOUNTER SYMPTOMS
FREQUENCY: 0
PARESTHESIAS: 0
HEMATOCHEZIA: 0
JOINT SWELLING: 0
SHORTNESS OF BREATH: 0
COUGH: 0
DIZZINESS: 0
ARTHRALGIAS: 0
HEADACHES: 0
WEAKNESS: 0
EYE PAIN: 0
NERVOUS/ANXIOUS: 0
MYALGIAS: 0
SORE THROAT: 0
HEMATURIA: 0
CHILLS: 0
FEVER: 0
HEARTBURN: 0
DIARRHEA: 0
PALPITATIONS: 0
NAUSEA: 0
ABDOMINAL PAIN: 0
DYSURIA: 0
CONSTIPATION: 0

## 2022-07-13 ASSESSMENT — PAIN SCALES - GENERAL: PAINLEVEL: NO PAIN (0)

## 2022-07-13 NOTE — PROGRESS NOTES
SUBJECTIVE:   CC: Bj Philip is an 51 year old male who presents for preventative health visit.     {  Patient has been advised of split billing requirements and indicates understanding: Yes  Healthy Habits:     Getting at least 3 servings of Calcium per day:  Yes    Bi-annual eye exam:  Yes    Dental care twice a year:  Yes    Sleep apnea or symptoms of sleep apnea:  Excessive snoring    Diet:  Regular (no restrictions)    Frequency of exercise:  2-3 days/week    Duration of exercise:  45-60 minutes    Taking medications regularly:  Yes    Medication side effects:  None    PHQ-2 Total Score: 0    Additional concerns today:  No        Today's PHQ-2 Score:   PHQ-2 ( 1999 [employer]) 7/13/2022   Q1: Little interest or pleasure in doing things 0   Q2: Feeling down, depressed or hopeless 0   PHQ-2 Score 0   PHQ-2 Total Score (12-17 Years)- Positive if 3 or more points; Administer PHQ-A if positive -   Q1: Little interest or pleasure in doing things Not at all   Q2: Feeling down, depressed or hopeless Not at all   PHQ-2 Score 0       Abuse: Current or Past(Physical, Sexual or Emotional)- No  Do you feel safe in your environment? Yes    Have you ever done Advance Care Planning? (For example, a Health Directive, POLST, or a discussion with a medical provider or your loved ones about your wishes): Yes, patient states has an Advance Care Planning document and will bring a copy to the clinic.    Social History     Tobacco Use     Smoking status: Never Smoker     Smokeless tobacco: Never Used   Substance Use Topics     Alcohol use: No     If you drink alcohol do you typically have >3 drinks per day or >7 drinks per week? No    Alcohol Use 7/13/2022   Prescreen: >3 drinks/day or >7 drinks/week? Not Applicable   Prescreen: >3 drinks/day or >7 drinks/week? -   No flowsheet data found.    Last PSA:   PSA   Date Value Ref Range Status   02/23/2021 0.98 0 - 4 ug/L Final     Comment:     Assay Method:  Chemiluminescence using  "[employer] [place] analyzer       Reviewed orders with patient. Reviewed health maintenance and updated orders accordingly - Yes  Lab work is in process  Labs reviewed in EPIC  BP Readings from Last 3 Encounters:   07/13/22 117/70   02/23/21 117/68   01/25/21 118/79    Wt Readings from Last 3 Encounters:   07/13/22 100.4 kg (221 lb 4.8 oz)   02/23/21 103.2 kg (227 lb 8 oz)   07/23/20 103.3 kg (227 lb 12.8 oz)                    Reviewed and updated as needed this visit by clinical staff   Tobacco  Allergies  Meds  Problems  Med Hx  Surg Hx  Fam Hx            Reviewed and updated as needed this visit by Provider   Tobacco  Allergies  Meds  Problems  Med Hx  Surg Hx  Fam Hx           Here today for routine checkup.  For the most part is doing very well overall.  Had recent PRP therapy to right elbow and left shoulder.  Both are improving.    Review of Systems   Constitutional: Negative for chills and fever.   HENT: Negative for congestion, ear pain, hearing loss and sore throat.    Eyes: Negative for pain and visual disturbance.   Respiratory: Negative for cough and shortness of breath.    Cardiovascular: Negative for chest pain, palpitations and peripheral edema.   Gastrointestinal: Negative for abdominal pain, constipation, diarrhea, heartburn, hematochezia and nausea.   Genitourinary: Positive for impotence. Negative for dysuria, frequency, genital sores, hematuria and urgency.   Musculoskeletal: Negative for arthralgias, joint swelling and myalgias.   Skin: Negative for rash.   Neurological: Negative for dizziness, weakness, headaches and paresthesias.   Psychiatric/Behavioral: Negative for mood changes. The patient is not nervous/anxious.        OBJECTIVE:   /70 (BP Location: Right arm, Patient Position: Sitting, Cuff Size: Adult Large)   Pulse 70   Temp 99.3  F (37.4  C) (Oral)   Resp 18   Ht 1.899 m (6' 2.75\")   Wt 100.4 kg (221 lb 4.8 oz)   SpO2 97%   BMI 27.85 kg/m      Physical " "Exam  GENERAL: healthy, alert and no distress  EYES: Eyes grossly normal to inspection, PERRL and conjunctivae and sclerae normal  HENT: ear canals and TM's normal, nose and mouth without ulcers or lesions  NECK: no adenopathy, no asymmetry, masses, or scars and thyroid normal to palpation  RESP: lungs clear to auscultation - no rales, rhonchi or wheezes  CV: regular rate and rhythm, normal S1 S2, no S3 or S4, no murmur, click or rub, no peripheral edema and peripheral pulses strong  ABDOMEN: soft, nontender, no hepatosplenomegaly, no masses and bowel sounds normal  MS: no gross musculoskeletal defects noted, no edema  SKIN: no suspicious lesions or rashes  NEURO: Normal strength and tone, mentation intact and speech normal  PSYCH: mentation appears normal, affect normal/bright    Diagnostic Test Results:  Labs reviewed in Epic    ASSESSMENT/PLAN:   (Z00.00) Routine general medical examination at a health care facility  (primary encounter diagnosis)  Comment: Routine health maintenance otherwise up-to-date  Plan:     (Z13.220) Screening cholesterol level  Comment:   Plan: Lipid panel reflex to direct LDL Non-fasting            (Z13.1) Diabetes mellitus screening  Comment:   Plan: Glucose            (Z12.5) Prostate cancer screening  Comment:   Plan: Prostate Specific Antigen Screen              Patient has been advised of split billing requirements and indicates understanding: Yes    COUNSELING:   Reviewed preventive health counseling, as reflected in patient instructions       Regular exercise       Healthy diet/nutrition       Vision screening       Colorectal cancer screening       Prostate cancer screening    Estimated body mass index is 27.85 kg/m  as calculated from the following:    Height as of this encounter: 1.899 m (6' 2.75\").    Weight as of this encounter: 100.4 kg (221 lb 4.8 oz).         He reports that he has never smoked. He has never used smokeless tobacco.      Counseling Resources:  ATP IV " Guidelines  Pooled Cohorts Equation Calculator  FRAX Risk Assessment  ICSI Preventive Guidelines  Dietary Guidelines for Americans, 2010  USDA's MyPlate  ASA Prophylaxis  Lung CA Screening    Acacia Petersen MD  [facility]

## 2022-07-14 ENCOUNTER — THERAPY VISIT (OUTPATIENT)
Dept: PHYSICAL THERAPY | Facility: CLINIC | Age: 52
End: 2022-07-14
Payer: OTHER MISCELLANEOUS

## 2022-07-14 DIAGNOSIS — M25.512 ACUTE PAIN OF LEFT SHOULDER: Primary | ICD-10-CM

## 2022-07-14 LAB
CHOLEST SERPL-MCNC: 155 MG/DL
FASTING STATUS PATIENT QL REPORTED: ABNORMAL
FASTING STATUS PATIENT QL REPORTED: NORMAL
GLUCOSE BLD-MCNC: 97 MG/DL (ref 70–99)
HDLC SERPL-MCNC: 37 MG/DL
LDLC SERPL CALC-MCNC: 97 MG/DL
NONHDLC SERPL-MCNC: 118 MG/DL
PSA SERPL-MCNC: 0.78 UG/L (ref 0–4)
TRIGL SERPL-MCNC: 104 MG/DL

## 2022-07-14 PROCEDURE — 97110 THERAPEUTIC EXERCISES: CPT | Mod: GP | Performed by: PHYSICAL THERAPIST

## 2022-07-14 PROCEDURE — 97112 NEUROMUSCULAR REEDUCATION: CPT | Mod: GP | Performed by: PHYSICAL THERAPIST

## 2022-07-14 NOTE — PROGRESS NOTES
Subjective:  HPI  Physical Exam                    Objective:  System    Physical Exam    General     ROS    Assessment/Plan:    PROGRESS  REPORT    Progress reporting period is from 4-26-22 to 7-14-22.       SUBJECTIVE  Subjective changes noted by patient:  Pt reports he is doing better since his PRP injection in general. His pain is about 80% since the injection and catching is better. HE still has issues with he is lifting something heavy like his daughter or anything overhead reaching is still painful and painful if he lies on his L side. He feels that his general movement is a lot better than before.          Current pain level is 3/10  .     Previous pain level was  6/10  .   Changes in function:  Yes (See Goal flowsheet attached for changes in current functional level)  Adverse reaction to treatment or activity: None    OBJECTIVE  Changes noted in objective findings:  Yes, L shoulder AROM: 150/145/T9/65    L shoulder strength: 4+/4/5/4        ASSESSMENT/PLAN  Updated problem list and treatment plan: Diagnosis 1:  L shoulder impingement/OA    Pain -  self management, education, directional preference exercise and home program  Decreased strength - therapeutic exercise and therapeutic activities  STG/LTGs have been met or progress has been made towards goals:  Yes (See Goal flow sheet completed today.)  Assessment of Progress: The patient's condition is improving.  The patient's condition has potential to improve.  Patient is meeting short term goals and is progressing towards long term goals.  Self Management Plans:  Patient has been instructed in a home treatment program.  Patient is independent in a home treatment program.  Patient  has been instructed in self management of symptoms.  Patient is independent in self management of symptoms.  I have re-evaluated this patient and find that the nature, scope, duration and intensity of the therapy is appropriate for the medical condition of the patient.  Bj  continues to require the following intervention to meet STG and LTG's:  PT intervention is no longer required to meet STG/LTG.    Recommendations:  This patient is ready to be discharged from therapy and continue their home treatment program.    Please refer to the daily flowsheet for treatment today, total treatment time and time spent performing 1:1 timed codes.

## 2022-07-14 NOTE — RESULT ENCOUNTER NOTE
Dear Amado Petersen is out of the office and I am reviewing your results.    Your prostate cancer screening test was negative.   Your cholesterol looks good.  We typically like to see the HDL (good cholesterol)above 50.  You can improve this with regular aerobic exercise.  Your blood sugar was normal.   Please call or MyChart my office with any questions or concerns.    Nikia Leonardo, PAC

## 2022-07-18 ENCOUNTER — TRANSFERRED RECORDS (OUTPATIENT)
Dept: HEALTH INFORMATION MANAGEMENT | Facility: CLINIC | Age: 52
End: 2022-07-18

## 2022-10-23 ENCOUNTER — HEALTH MAINTENANCE LETTER (OUTPATIENT)
Age: 52
End: 2022-10-23

## 2022-12-29 ENCOUNTER — E-VISIT (OUTPATIENT)
Dept: FAMILY MEDICINE | Facility: CLINIC | Age: 52
End: 2022-12-29
Payer: COMMERCIAL

## 2022-12-29 DIAGNOSIS — Z71.84 TRAVEL ADVICE ENCOUNTER: Primary | ICD-10-CM

## 2022-12-29 PROCEDURE — 99421 OL DIG E/M SVC 5-10 MIN: CPT | Performed by: NURSE PRACTITIONER

## 2022-12-29 RX ORDER — ATOVAQUONE AND PROGUANIL HYDROCHLORIDE 250; 100 MG/1; MG/1
1 TABLET, FILM COATED ORAL DAILY
Qty: 40 TABLET | Refills: 0 | Status: SHIPPED | OUTPATIENT
Start: 2022-12-29 | End: 2023-01-18

## 2023-01-18 DIAGNOSIS — Z71.84 TRAVEL ADVICE ENCOUNTER: ICD-10-CM

## 2023-01-18 RX ORDER — ATOVAQUONE AND PROGUANIL HYDROCHLORIDE 250; 100 MG/1; MG/1
1 TABLET, FILM COATED ORAL DAILY
Qty: 40 TABLET | Refills: 0 | Status: SHIPPED | OUTPATIENT
Start: 2023-01-18 | End: 2023-09-11

## 2023-03-28 ENCOUNTER — TRANSFERRED RECORDS (OUTPATIENT)
Dept: HEALTH INFORMATION MANAGEMENT | Facility: CLINIC | Age: 53
End: 2023-03-28

## 2023-04-25 ENCOUNTER — TRANSFERRED RECORDS (OUTPATIENT)
Dept: HEALTH INFORMATION MANAGEMENT | Facility: CLINIC | Age: 53
End: 2023-04-25
Payer: COMMERCIAL

## 2023-04-28 ENCOUNTER — THERAPY VISIT (OUTPATIENT)
Dept: PHYSICAL THERAPY | Facility: CLINIC | Age: 53
End: 2023-04-28
Payer: OTHER MISCELLANEOUS

## 2023-04-28 ENCOUNTER — TRANSCRIBE ORDERS (OUTPATIENT)
Dept: OTHER | Age: 53
End: 2023-04-28

## 2023-04-28 DIAGNOSIS — M19.011 OSTEOARTHRITIS OF RIGHT GLENOHUMERAL JOINT: Primary | ICD-10-CM

## 2023-04-28 DIAGNOSIS — M25.511 ACUTE PAIN OF RIGHT SHOULDER: ICD-10-CM

## 2023-04-28 DIAGNOSIS — M67.911 TENDINOPATHY OF RIGHT ROTATOR CUFF: ICD-10-CM

## 2023-04-28 PROCEDURE — 97110 THERAPEUTIC EXERCISES: CPT | Mod: GP | Performed by: PHYSICAL THERAPIST

## 2023-04-28 PROCEDURE — 97112 NEUROMUSCULAR REEDUCATION: CPT | Mod: GP | Performed by: PHYSICAL THERAPIST

## 2023-04-28 PROCEDURE — 97161 PT EVAL LOW COMPLEX 20 MIN: CPT | Mod: GP | Performed by: PHYSICAL THERAPIST

## 2023-04-28 NOTE — PROGRESS NOTES
Physical Therapy Initial Evaluation  Subjective:  Pt reports that on 3-31-23 he slipped on the ice with his arm and his R shoulder braced him and slid backwards. He felt immediate pain and he could still move his arm but then over the next couple of days his motion slowly became less and less. He went to see Dr. Branham and had an MRI on 4-20-23 that he reports shows a less than 50% RCT and he will send him to PT and gave him a US-guided injection on 4-25-23. He is off work now until he sees Dr. Branham on 6-5-23.     The history is provided by the patient. No  was used.   Therapist Generated HPI Evaluation         Type of problem:  Right shoulder.    This is a new condition.  Condition occurred with:  A fall.  Where condition occurred: at work.  Patient reports pain:  Anterior and upper trap (deep to his ACJ).  Pain is described as sharp and is intermittent.  Pain radiates to:  Shoulder. Pain is the same all the time.  Since onset symptoms are gradually improving (since the shot).  Associated symptoms:  Catching, loss of motion/stiffness, loss of strength and dislocating/subluxing. Symptoms are exacerbated by using arm at shoulder level, using arm behind back, using arm overhead, lying on extremity and carrying  and relieved by ice and rest.  Special tests included:  MRI.  Previous treatment includes physical therapy and surgery. There was moderate improvement following previous treatment.  Restrictions due to condition include:  Currently not working due to present treatment.  Barriers include:  None as reported by patient.    Patient Health History         Pain is reported as 5/10 on pain scale.  General health as reported by patient is good.  Pertinent medical history includes: none.   Red flags:  None as reported by patient.  Medical allergies: none.   Surgeries include:  Orthopedic surgery. Other surgery history details: B shoulder scopes and will have HH replacement on L shoulder in June  of 2023.    Current medications:  Anti-depressants.    Current occupation is .   Primary job tasks include:  Repetitive tasks, pushing/pulling, prolonged standing, lifting/carrying and operating a machine/assembly.                                    Objective:  System                   Shoulder Evaluation:  ROM:  AROM:    Flexion:  Left:  140    Right:  87    Abduction:  Left: 98   Right:  62      External Rotation:  Left:  54    Right:  52            Extension/Internal Rotation:  Left:  T10    Right:  L1    PROM:  not assessed                                Strength:  not assessed                      Stability Testing:  not assessed      Special Tests:  not assessed                                           General     ROS    Assessment/Plan:    Patient is a 52 year old male with right side shoulder complaints.    Patient has the following significant findings with corresponding treatment plan.                Diagnosis 1:  R shoulder RCT    Pain -  hot/cold therapy, self management, education, directional preference exercise and home program  Decreased ROM/flexibility - manual therapy and therapeutic exercise  Decreased joint mobility - manual therapy and therapeutic exercise  Decreased strength - therapeutic exercise and therapeutic activities    Therapy Evaluation Codes:       1) Clinical presentation characteristics are:   Stable/Uncomplicated.  2) Decision-Making    Low complexity using standardized patient assessment instrument and/or measureable assessment of functional outcome.  Cumulative Therapy Evaluation is: Low complexity.    Previous and current functional limitations:  (See Goal Flow Sheet for this information)    Short term and Long term goals: (See Goal Flow Sheet for this information)     Communication ability:  Patient appears to be able to clearly communicate and understand verbal and written communication and follow directions correctly.  Treatment Explanation - The following has been  discussed with the patient:   RX ordered/plan of care  Anticipated outcomes  Possible risks and side effects  This patient would benefit from PT intervention to resume normal activities.   Rehab potential is good.    Frequency:  1 X week, once daily  Duration:  for 6 weeks  Discharge Plan:  Achieve all LTG.  Independent in home treatment program.  Reach maximal therapeutic benefit.    Please refer to the daily flowsheet for treatment today, total treatment time and time spent performing 1:1 timed codes.

## 2023-05-03 ENCOUNTER — THERAPY VISIT (OUTPATIENT)
Dept: PHYSICAL THERAPY | Facility: CLINIC | Age: 53
End: 2023-05-03
Payer: OTHER MISCELLANEOUS

## 2023-05-03 DIAGNOSIS — M25.511 ACUTE PAIN OF RIGHT SHOULDER: Primary | ICD-10-CM

## 2023-05-03 PROCEDURE — 97140 MANUAL THERAPY 1/> REGIONS: CPT | Mod: GP | Performed by: PHYSICAL THERAPIST

## 2023-05-03 PROCEDURE — 97110 THERAPEUTIC EXERCISES: CPT | Mod: GP | Performed by: PHYSICAL THERAPIST

## 2023-05-03 NOTE — PROGRESS NOTES
Subjective:  HPI  Physical Exam                    Objective:  System    Physical Exam    General     ROS    Assessment/Plan:    SUBJECTIVE  Subjective changes as noted by pt:  He reports he hasn't tried his stretches yet to take full advantage of his recent injection. He notes midrange pain remains but it's a little better once he is up overhead now but the process to get there is more sore.     He is doing his neck exercises about 4-5x/day       Current pain level: 2/10     Changes in function:  Yes (See Goal flowsheet attached for changes in current functional level)     Adverse reaction to treatment or activity:  None    OBJECTIVE  Changes in objective findings:  Yes,     He has a tendency to hike with UT when he pushes forward and specifically on the eccentric motion with wand flexion. It was better after cuing for scapular dep and when he transitioned to the wall for flexion instead of the wand.     R shoulder AAROM:  Wall flexion= 155  ABD= 132  Wand ER supine=60  EXT=67          ASSESSMENT  Abdullai continues to require intervention to meet STG and LTG's: PT  Patient's symptoms are resolving.  Patient is progressing as expected.  Response to therapy has shown an improvement in  pain level, ROM  and flexibility  Progress made towards STG/LTG?  Yes (See Goal flowsheet attached for updates on achievement of STG and LTG)    PLAN  Current treatment program is being advanced to more complex exercises.    PTA/ATC plan:  N/A    Please refer to the daily flowsheet for treatment today, total treatment time and time spent performing 1:1 timed codes.

## 2023-05-12 ENCOUNTER — THERAPY VISIT (OUTPATIENT)
Dept: PHYSICAL THERAPY | Facility: CLINIC | Age: 53
End: 2023-05-12
Payer: OTHER MISCELLANEOUS

## 2023-05-12 DIAGNOSIS — M25.511 ACUTE PAIN OF RIGHT SHOULDER: Primary | ICD-10-CM

## 2023-05-12 PROCEDURE — 97140 MANUAL THERAPY 1/> REGIONS: CPT | Mod: GP | Performed by: PHYSICAL THERAPIST

## 2023-05-12 PROCEDURE — 97112 NEUROMUSCULAR REEDUCATION: CPT | Mod: GP | Performed by: PHYSICAL THERAPIST

## 2023-05-12 PROCEDURE — 97110 THERAPEUTIC EXERCISES: CPT | Mod: GP | Performed by: PHYSICAL THERAPIST

## 2023-05-17 ENCOUNTER — THERAPY VISIT (OUTPATIENT)
Dept: PHYSICAL THERAPY | Facility: CLINIC | Age: 53
End: 2023-05-17
Payer: OTHER MISCELLANEOUS

## 2023-05-17 DIAGNOSIS — M25.511 ACUTE PAIN OF RIGHT SHOULDER: Primary | ICD-10-CM

## 2023-05-17 PROCEDURE — 97112 NEUROMUSCULAR REEDUCATION: CPT | Mod: GP | Performed by: PHYSICAL THERAPIST

## 2023-05-17 PROCEDURE — 97110 THERAPEUTIC EXERCISES: CPT | Mod: GP | Performed by: PHYSICAL THERAPIST

## 2023-05-31 ENCOUNTER — THERAPY VISIT (OUTPATIENT)
Dept: PHYSICAL THERAPY | Facility: CLINIC | Age: 53
End: 2023-05-31
Payer: OTHER MISCELLANEOUS

## 2023-05-31 DIAGNOSIS — M25.511 ACUTE PAIN OF RIGHT SHOULDER: Primary | ICD-10-CM

## 2023-05-31 PROCEDURE — 97112 NEUROMUSCULAR REEDUCATION: CPT | Mod: GP | Performed by: PHYSICAL THERAPIST

## 2023-05-31 PROCEDURE — 97110 THERAPEUTIC EXERCISES: CPT | Mod: GP | Performed by: PHYSICAL THERAPIST

## 2023-06-02 ENCOUNTER — TRANSFERRED RECORDS (OUTPATIENT)
Dept: HEALTH INFORMATION MANAGEMENT | Facility: CLINIC | Age: 53
End: 2023-06-02
Payer: COMMERCIAL

## 2023-06-13 ENCOUNTER — PATIENT OUTREACH (OUTPATIENT)
Dept: CARE COORDINATION | Facility: CLINIC | Age: 53
End: 2023-06-13
Payer: COMMERCIAL

## 2023-06-20 ENCOUNTER — TRANSFERRED RECORDS (OUTPATIENT)
Dept: HEALTH INFORMATION MANAGEMENT | Facility: CLINIC | Age: 53
End: 2023-06-20
Payer: COMMERCIAL

## 2023-06-27 ENCOUNTER — PATIENT OUTREACH (OUTPATIENT)
Dept: CARE COORDINATION | Facility: CLINIC | Age: 53
End: 2023-06-27
Payer: COMMERCIAL

## 2023-06-29 ENCOUNTER — TRANSFERRED RECORDS (OUTPATIENT)
Dept: HEALTH INFORMATION MANAGEMENT | Facility: CLINIC | Age: 53
End: 2023-06-29
Payer: COMMERCIAL

## 2023-07-13 ENCOUNTER — TRANSFERRED RECORDS (OUTPATIENT)
Dept: HEALTH INFORMATION MANAGEMENT | Facility: CLINIC | Age: 53
End: 2023-07-13
Payer: COMMERCIAL

## 2023-07-25 ENCOUNTER — TRANSFERRED RECORDS (OUTPATIENT)
Dept: HEALTH INFORMATION MANAGEMENT | Facility: CLINIC | Age: 53
End: 2023-07-25
Payer: COMMERCIAL

## 2023-07-28 ENCOUNTER — THERAPY VISIT (OUTPATIENT)
Dept: PHYSICAL THERAPY | Facility: CLINIC | Age: 53
End: 2023-07-28
Payer: OTHER MISCELLANEOUS

## 2023-07-28 DIAGNOSIS — M25.511 ACUTE PAIN OF RIGHT SHOULDER: Primary | ICD-10-CM

## 2023-07-28 PROCEDURE — 97110 THERAPEUTIC EXERCISES: CPT | Mod: GP | Performed by: PHYSICAL THERAPIST

## 2023-07-28 PROCEDURE — 97112 NEUROMUSCULAR REEDUCATION: CPT | Mod: GP | Performed by: PHYSICAL THERAPIST

## 2023-07-29 NOTE — TELEPHONE ENCOUNTER
DIAGNOSIS: My left hip   APPOINTMENT DATE: 08/02/23   NOTES STATUS DETAILS   MEDICATION LIST Internal      Action 07/29/23  12:29 PM - MMB   Action Taken  Left voicemail with patient to see if he has any previous records/imaging.    07/31/23 - No known imaging or records.

## 2023-07-31 ENCOUNTER — TRANSCRIBE ORDERS (OUTPATIENT)
Dept: OTHER | Age: 53
End: 2023-07-31

## 2023-07-31 DIAGNOSIS — M19.011 OSTEOARTHRITIS OF RIGHT GLENOHUMERAL JOINT: Primary | ICD-10-CM

## 2023-07-31 DIAGNOSIS — M67.911 TENDINOPATHY OF RIGHT ROTATOR CUFF: ICD-10-CM

## 2023-08-01 DIAGNOSIS — M25.551 RIGHT HIP PAIN: Primary | ICD-10-CM

## 2023-08-02 ENCOUNTER — ANCILLARY PROCEDURE (OUTPATIENT)
Dept: GENERAL RADIOLOGY | Facility: CLINIC | Age: 53
End: 2023-08-02
Attending: FAMILY MEDICINE
Payer: COMMERCIAL

## 2023-08-02 ENCOUNTER — PRE VISIT (OUTPATIENT)
Dept: ORTHOPEDICS | Facility: CLINIC | Age: 53
End: 2023-08-02
Payer: COMMERCIAL

## 2023-08-02 ENCOUNTER — OFFICE VISIT (OUTPATIENT)
Dept: ORTHOPEDICS | Facility: CLINIC | Age: 53
End: 2023-08-02
Payer: COMMERCIAL

## 2023-08-02 DIAGNOSIS — M25.551 RIGHT HIP PAIN: ICD-10-CM

## 2023-08-02 DIAGNOSIS — M67.952 TENDINOPATHY OF LEFT GLUTEUS MEDIUS: Primary | ICD-10-CM

## 2023-08-02 PROCEDURE — 99203 OFFICE O/P NEW LOW 30 MIN: CPT | Performed by: FAMILY MEDICINE

## 2023-08-02 PROCEDURE — 73502 X-RAY EXAM HIP UNI 2-3 VIEWS: CPT | Mod: LT | Performed by: RADIOLOGY

## 2023-08-02 NOTE — LETTER
8/2/2023      RE: Bj Philip  [address]  [place] [address]     Dear Colleague,    Thank you for referring your patient, Bj Philip, to the [facility] [place]. Please see a copy of my visit note below.    Sports Medicine Clinic Visit    PCP: Acacia Petersen [place]    Bj Philip is a 52 year old male who is seen  as self referral presenting with left lateral hip pain.    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       PMH:  Past Medical History:   Diagnosis Date    Pain     left thumb    Seasonal allergies      s/p left shoulder arthroscopic extensive debridement, glenohumeral joint, subacromial space with rotator interval release on 10/23/17 by Dr. Branham 11/07/2017     Osteoarthritis of left glenohumeral joint 05/25/2017     Right shoulder s/p arthroscopic SAC, AC joint resection, GH and SAD extensive debridement and mini open biceps transplantation DOS: 2/24/2014 06/05/2014     Scapular dyskinesis 06/05/2014     Shoulder pain 06/05/2014     Impingement syndrome of shoulder 05/06/2011     S/p left shoulder arthroscopic subacromial decompression, mini-open subpectoral biceps tenodesis, acromioclaviuclar joint resection, loose body removal DOS 8/2/2010     Surgical History    Surgical History  Surgery Date Site/Laterality Comments   (IA) [place] SHOULDER ARTHROSCOPY SURGERY 08/02/2010   Left    (IA) [place] SHLDR ARTHROSCOP SURG DIS CLAVICULECTOMY 08/02/2010   Left, modifier 59    (IA) [place] SHLDR ARTHROSCOP PART DEBRIDE          Active problem list:  Patient Active  Problem List   Diagnosis    CARDIOVASCULAR SCREENING; LDL GOAL LESS THAN 160    Seasonal allergies    Vitamin D deficiency    S/P arthroscopy of shoulder    Labral tear of shoulder, left, initial encounter    Erectile dysfunction, unspecified erectile dysfunction type    Acute pain of left shoulder    Scapular dyskinesis    Acute pain of right shoulder       FH:  Family History   Problem Relation Age of Onset    Anesthesia Reaction No family hx of     Blood Disease No family hx of     Hypertension No family hx of     Cerebrovascular Disease No family hx of     C.A.D. No family hx of     Diabetes No family hx of     Cancer No family hx of        SH:  Social History     Socioeconomic History    Marital status: Single     Spouse name: Not on file    Number of children: Not on file    Years of education: Not on file    Highest education level: Not on file   Occupational History    Not on file   Tobacco Use    Smoking status: Never    Smokeless tobacco: Never   Substance and Sexual Activity    Alcohol use: No    Drug use: No    Sexual activity: Yes     Partners: Female   Other Topics Concern    Parent/sibling w/ CABG, MI or angioplasty before 65F 55M? Not Asked   Social History Narrative    Not on file     Social Determinants of Health     Financial Resource Strain: Not on file   Food Insecurity: Not on file   Transportation Needs: Not on file   Physical Activity: Not on file   Stress: Not on file   Social Connections: Not on file   Intimate Partner Violence: Not on file   Housing Stability: Not on file       MEDS:  See EMR, reviewed  ALL:  See EMR, reviewed    REVIEW OF SYSTEMS:  CONSTITUTIONAL:NEGATIVE for fever, chills, change in weight  INTEGUMENTARY/SKIN: NEGATIVE for worrisome rashes, moles or lesions  EYES: NEGATIVE for vision changes or irritation  ENT/MOUTH: NEGATIVE for ear, mouth and throat problems  RESP:NEGATIVE for significant cough or SOB  BREAST: NEGATIVE for masses, tenderness or discharge  CV: NEGATIVE  for chest pain, palpitations or peripheral edema  GI: NEGATIVE for nausea, abdominal pain, heartburn, or change in bowel habits  :NEGATIVE for frequency, dysuria, or hematuria  :NEGATIVE for frequency, dysuria, or hematuria  NEURO: NEGATIVE for weakness, dizziness or paresthesias  ENDOCRINE: NEGATIVE for temperature intolerance, skin/hair changes  HEME/ALLERGY/IMMUNE: NEGATIVE for bleeding problems  PSYCHIATRIC: NEGATIVE for changes in mood or affect      Objective: He has normal range of motion of the left hip to internal rotation, external rotation and abduction.  He is tender over the greater trochanter of the left hip, nontender over the distal IT band.  Don test is negative.  Nontender over the SI joints bilaterally.  Strength is intact at hip, knee, ankle and foot.  Sensation is normal distally.  Appropriate conversation and affect.    I personally reviewed with the patient x-rays left hip that show no degenerative change or bony abnormality.  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  No significant DJD bilaterally.      Assessment: Left gluteus medius tendinitis    Plan: Patient understands that nonsteroidal anti-inflammatories do not cause tendons to heal.  Patient would like to avoid injections, including cortisone injections.  I discussed that cortisone shots also have not been found to cause irritated tendons to heal.  We discussed localized massage against a wall with a tennis ball.  We discussed improving the ergonomics of the seated position, and physical therapy options.  He would like to see a physical therapist at [place].  Referral placed.      Again, thank you for allowing me to participate in the care of your patient.      Sincerely,    Param Sargent MD

## 2023-08-02 NOTE — PROGRESS NOTES
Sports Medicine Clinic Visit    PCP: Acacia Petersen [place] Philip is a 52 year old male who is seen  as self referral presenting with left lateral hip pain.    Injury: No mechanism of injury     Location of Pain: left hip; lateral hip   Duration of Pain: 1 month(s)  Rating of Pain: 3/10  Pain is better with: Nothing  Pain is worse with: Laying on the side and crossing his legs   Additional Features: None   Treatment so far consists of: Nothing  Prior History of related problems: None     Patient has been a city , part of day at [employer]    There were no vitals taken for this visit.       PMH:  Past Medical History:   Diagnosis Date    Pain     left thumb    Seasonal allergies      s/p left shoulder arthroscopic extensive debridement, glenohumeral joint, subacromial space with rotator interval release on 10/23/17 by Dr. Branham 11/07/2017     Osteoarthritis of left glenohumeral joint 05/25/2017     Right shoulder s/p arthroscopic SAC, AC joint resection, GH and SAD extensive debridement and mini open biceps transplantation DOS: 2/24/2014 06/05/2014     Scapular dyskinesis 06/05/2014     Shoulder pain 06/05/2014     Impingement syndrome of shoulder 05/06/2011     S/p left shoulder arthroscopic subacromial decompression, mini-open subpectoral biceps tenodesis, acromioclaviuclar joint resection, loose body removal DOS 8/2/2010     Surgical History    Surgical History  Surgery Date Site/Laterality Comments   (IA) [place] SHOULDER ARTHROSCOPY SURGERY 08/02/2010   Left    (IA) [place] SHLDR ARTHROSCOP SURG DIS CLAVICULECTOMY 08/02/2010   Left, modifier 59    (IA) [place] SHLDR ARTHROSCOP PART DEBRIDE          Active problem list:  Patient Active Problem List   Diagnosis    CARDIOVASCULAR SCREENING; LDL GOAL LESS THAN 160    Seasonal allergies    Vitamin D deficiency    S/P arthroscopy of shoulder    Labral tear of shoulder, left, initial encounter    Erectile dysfunction, unspecified erectile dysfunction type     Acute pain of left shoulder    Scapular dyskinesis    Acute pain of right shoulder       FH:  Family History   Problem Relation Age of Onset    Anesthesia Reaction No family hx of     Blood Disease No family hx of     Hypertension No family hx of     Cerebrovascular Disease No family hx of     C.A.D. No family hx of     Diabetes No family hx of     Cancer No family hx of        SH:  Social History     Socioeconomic History    Marital status: Single     Spouse name: Not on file    Number of children: Not on file    Years of education: Not on file    Highest education level: Not on file   Occupational History    Not on file   Tobacco Use    Smoking status: Never    Smokeless tobacco: Never   Substance and Sexual Activity    Alcohol use: No    Drug use: No    Sexual activity: Yes     Partners: Female   Other Topics Concern    Parent/sibling w/ CABG, MI or angioplasty before 65F 55M? Not Asked   Social History Narrative    Not on file     Social Determinants of Health     Financial Resource Strain: Not on file   Food Insecurity: Not on file   Transportation Needs: Not on file   Physical Activity: Not on file   Stress: Not on file   Social Connections: Not on file   Intimate Partner Violence: Not on file   Housing Stability: Not on file       MEDS:  See EMR, reviewed  ALL:  See EMR, reviewed    REVIEW OF SYSTEMS:  CONSTITUTIONAL:NEGATIVE for fever, chills, change in weight  INTEGUMENTARY/SKIN: NEGATIVE for worrisome rashes, moles or lesions  EYES: NEGATIVE for vision changes or irritation  ENT/MOUTH: NEGATIVE for ear, mouth and throat problems  RESP:NEGATIVE for significant cough or SOB  BREAST: NEGATIVE for masses, tenderness or discharge  CV: NEGATIVE for chest pain, palpitations or peripheral edema  GI: NEGATIVE for nausea, abdominal pain, heartburn, or change in bowel habits  :NEGATIVE for frequency, dysuria, or hematuria  :NEGATIVE for frequency, dysuria, or hematuria  NEURO: NEGATIVE for weakness, dizziness or  paresthesias  ENDOCRINE: NEGATIVE for temperature intolerance, skin/hair changes  HEME/ALLERGY/IMMUNE: NEGATIVE for bleeding problems  PSYCHIATRIC: NEGATIVE for changes in mood or affect      Objective: He has normal range of motion of the left hip to internal rotation, external rotation and abduction.  He is tender over the greater trochanter of the left hip, nontender over the distal IT band.  Don test is negative.  Nontender over the SI joints bilaterally.  Strength is intact at hip, knee, ankle and foot.  Sensation is normal distally.  Appropriate conversation and affect.    I personally reviewed with the patient x-rays left hip that show no degenerative change or bony abnormality.  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  No significant DJD bilaterally.      Assessment: Left gluteus medius tendinitis    Plan: Patient understands that nonsteroidal anti-inflammatories do not cause tendons to heal.  Patient would like to avoid injections, including cortisone injections.  I discussed that cortisone shots also have not been found to cause irritated tendons to heal.  We discussed localized massage against a wall with a tennis ball.  We discussed improving the ergonomics of the seated position, and physical therapy options.  He would like to see a physical therapist at [facility].  Referral placed.

## 2023-08-09 ENCOUNTER — THERAPY VISIT (OUTPATIENT)
Dept: PHYSICAL THERAPY | Facility: CLINIC | Age: 53
End: 2023-08-09
Payer: OTHER MISCELLANEOUS

## 2023-08-09 DIAGNOSIS — M25.511 ACUTE PAIN OF RIGHT SHOULDER: Primary | ICD-10-CM

## 2023-08-09 PROCEDURE — 97112 NEUROMUSCULAR REEDUCATION: CPT | Mod: GP | Performed by: PHYSICAL THERAPIST

## 2023-08-09 PROCEDURE — 97110 THERAPEUTIC EXERCISES: CPT | Mod: GP | Performed by: PHYSICAL THERAPIST

## 2023-08-22 ENCOUNTER — TRANSFERRED RECORDS (OUTPATIENT)
Dept: HEALTH INFORMATION MANAGEMENT | Facility: CLINIC | Age: 53
End: 2023-08-22
Payer: COMMERCIAL

## 2023-08-31 ENCOUNTER — THERAPY VISIT (OUTPATIENT)
Dept: PHYSICAL THERAPY | Facility: CLINIC | Age: 53
End: 2023-08-31
Attending: FAMILY MEDICINE
Payer: COMMERCIAL

## 2023-08-31 DIAGNOSIS — M67.952 TENDINOPATHY OF LEFT GLUTEUS MEDIUS: Primary | ICD-10-CM

## 2023-08-31 PROCEDURE — 97110 THERAPEUTIC EXERCISES: CPT | Mod: GP | Performed by: PHYSICAL THERAPIST

## 2023-08-31 PROCEDURE — 97161 PT EVAL LOW COMPLEX 20 MIN: CPT | Mod: GP | Performed by: PHYSICAL THERAPIST

## 2023-08-31 ASSESSMENT — ACTIVITIES OF DAILY LIVING (ADL)
SITTING_FOR_15_MINUTES: SLIGHT DIFFICULTY
ROLLING_OVER_IN_BED: EXTREME DIFFICULTY
TWISTING/PIVOTING_ON_INVOLVED_LEG: MODERATE DIFFICULTY
HOS_ADL_ITEM_SCORE_TOTAL: 40
DEEP_SQUATTING: MODERATE DIFFICULTY
GOING_DOWN_1_FLIGHT_OF_STAIRS: NO DIFFICULTY AT ALL
WALKING_APPROXIMATELY_10_MINUTES: MODERATE DIFFICULTY
WALKING_15_MINUTES_OR_GREATER: MODERATE DIFFICULTY
WALKING_DOWN_STEEP_HILLS: SLIGHT DIFFICULTY
GETTING_INTO_AND_OUT_OF_AN_AVERAGE_CAR: NO DIFFICULTY AT ALL
HOS_ADL_SCORE(%): 62.5
GETTING_INTO_AND_OUT_OF_A_BATHTUB: SLIGHT DIFFICULTY
HEAVY_WORK: EXTREME DIFFICULTY
GOING_UP_1_FLIGHT_OF_STAIRS: SLIGHT DIFFICULTY
HOS_ADL_COUNT: 16
WALKING_UP_STEEP_HILLS: SLIGHT DIFFICULTY
WALKING_INITIALLY: MODERATE DIFFICULTY
HOS_ADL_HIGHEST_POTENTIAL_SCORE: 64
STEPPING_UP_AND_DOWN_CURBS: NO DIFFICULTY AT ALL
PUTTING_ON_SOCKS_AND_SHOES: NO DIFFICULTY AT ALL
RECREATIONAL_ACTIVITIES: EXTREME DIFFICULTY
STANDING_FOR_15_MINUTES: SLIGHT DIFFICULTY

## 2023-08-31 NOTE — PROGRESS NOTES
PHYSICAL THERAPY EVALUATION  Type of Visit: Evaluation    See electronic medical record for Abuse and Falls Screening details.    Subjective       Presenting condition or subjective complaint: L hip Pt reports insidious onset of L hip pain about two months ago (June 2023). Notices pain when he has his legs crossed for a while, feels like he has to tighten his glute to feel better. Saw MD and referred to PT.   Date of onset: 06/30/23    Relevant medical history:     Dates & types of surgery:      Prior diagnostic imaging/testing results: X-ray     Prior therapy history for the same diagnosis, illness or injury: No      Prior Level of Function  Transfers:   Ambulation:   ADL:   IADL:     Living Environment  Social support: With family members   Type of home: [facility]   Stairs to enter the home:         Ramp:     Stairs inside the home:         Help at home:    Equipment owned:       Employment: Yes Tech  Hobbies/Interests:      Patient goals for therapy: can't sleep on L margaret or lean on L side for long    Pain assessment: See objective evaluation for additional pain details     Objective   HIP EVALUATION  PAIN: Pain Level with Use: 10/10  Pain Location: L superior glute, can move to Lateral glute  Pain Quality: Dull  Pain Frequency: intermittent  Pain is Worst: activity or position related  Pain is Exacerbated By: lying on L side, sitting too long, crossing leg with knees straight, trying to walk after sitting too long  Pain is Relieved By: rubbing the area, cold  Pain Progression: Unchanged  INTEGUMENTARY (edema, incisions):   POSTURE: Sitting Posture: Lordosis decreased  GAIT:   Weightbearing Status:   Assistive Device(s):   Gait Deviations:   BALANCE/PROPRIOCEPTION:   WEIGHTBEARING ALIGNMENT: WNL  NON-WEIGHTBEARING ALIGNMENT:    ROM:   (Degrees) Left AROM Left PROM  Right AROM Right PROM   Hip Flexion  WNL  WNL   Hip Extension  WNL  WNL   Hip Abduction       Hip Adduction       Hip Internal Rotation  WNL  WNL   Hip  External Rotation  WNL with ERP on L glute  WNL   Knee Flexion       Knee Extension       Lumbar Side glide WNL WNL   Lumbar Flexion Nil with PDM on L glute   Lumbar extension Nil to min loss   Pain:   End feel:     Symptomatic response Mechanical response    During testing After testing Effect - increased ROM, decreased ROM, or key functional test No Effect   Pretest symptoms standing:      Rep FIS       Rep EIS         Pretest symptoms lying: none    During testing After testing Effect - increased ROM, decreased ROM, or key functional test No Effect   Rep GIACOMO       Rep EIL Centralising Centralized Dec pain L hip ER PROM, pain less getting off table, less pain upon rising from chair, 5/5 L hip abduction      If required, pretest symptoms:    During testing After testing Effect - increased ROM, decreased ROM, or key functional test No Effect   Rep SGIS - R       Rep SGIS - L             PELVIC/SI SCREEN:   STRENGTH:  4+5 B hip abduction, all other B leg MMT WNL  LE FLEXIBILITY:   SPECIAL TESTS:   FUNCTIONAL TESTS:   PALPATION:  tender upon palpation of L glute medius  JOINT MOBILITY: WNL    Assessment & Plan   CLINICAL IMPRESSIONS  Medical Diagnosis: Tendinopathy of left gluteus medius    Treatment Diagnosis: L unilateral above knee lumbar derangement   Impression/Assessment: Patient is a 52 year old male with L hip/low back complaints.  The following significant findings have been identified: Pain, Decreased ROM/flexibility, Decreased joint mobility, Decreased strength, Inflammation, and Impaired posture. These impairments interfere with their ability to perform self care tasks, driving , household mobility, and community mobility as compared to previous level of function.     Clinical Decision Making (Complexity):  Clinical Presentation: Stable/Uncomplicated  Clinical Presentation Rationale: based on medical and personal factors listed in PT evaluation  Clinical Decision Making (Complexity): Low complexity    PLAN  OF CARE  Treatment Interventions:  Interventions: Manual Therapy, Neuromuscular Re-education, Therapeutic Activity, Therapeutic Exercise    Long Term Goals     PT Goal 1  Goal Identifier: walking  Goal Description: Pt will be able to walk 30 min pain free  Rationale: to maximize safety and independence within the community;to maximize safety and independence with performance of ADLs and functional tasks;to maximize safety and independence within the home  Goal Progress: 15 min with pain as of 8/31  Target Date: 10/26/23      Frequency of Treatment: 1x/w  Duration of Treatment: 8 wks    Recommended Referrals to Other Professionals:   Education Assessment:        Risks and benefits of evaluation/treatment have been explained.   Patient/Family/caregiver agrees with Plan of Care.     Evaluation Time:     PT Eval, Low Complexity Minutes (32359): 18       Signing Clinician: Roberto Beltran PT

## 2023-09-02 ENCOUNTER — HEALTH MAINTENANCE LETTER (OUTPATIENT)
Age: 53
End: 2023-09-02

## 2023-09-05 ENCOUNTER — THERAPY VISIT (OUTPATIENT)
Dept: PHYSICAL THERAPY | Facility: CLINIC | Age: 53
End: 2023-09-05
Payer: COMMERCIAL

## 2023-09-05 DIAGNOSIS — M67.952 TENDINOPATHY OF LEFT GLUTEUS MEDIUS: Primary | ICD-10-CM

## 2023-09-05 PROCEDURE — 97530 THERAPEUTIC ACTIVITIES: CPT | Mod: GP | Performed by: PHYSICAL THERAPIST

## 2023-09-05 PROCEDURE — 97110 THERAPEUTIC EXERCISES: CPT | Mod: GP | Performed by: PHYSICAL THERAPIST

## 2023-09-05 PROCEDURE — 97140 MANUAL THERAPY 1/> REGIONS: CPT | Mod: GP | Performed by: PHYSICAL THERAPIST

## 2023-09-11 ENCOUNTER — OFFICE VISIT (OUTPATIENT)
Dept: FAMILY MEDICINE | Facility: CLINIC | Age: 53
End: 2023-09-11
Payer: OTHER MISCELLANEOUS

## 2023-09-11 VITALS
DIASTOLIC BLOOD PRESSURE: 89 MMHG | WEIGHT: 225.4 LBS | SYSTOLIC BLOOD PRESSURE: 127 MMHG | BODY MASS INDEX: 28.03 KG/M2 | HEART RATE: 66 BPM | TEMPERATURE: 98.3 F | HEIGHT: 75 IN | RESPIRATION RATE: 16 BRPM | OXYGEN SATURATION: 96 %

## 2023-09-11 DIAGNOSIS — E55.9 VITAMIN D DEFICIENCY: ICD-10-CM

## 2023-09-11 DIAGNOSIS — M75.101 TEAR OF RIGHT ROTATOR CUFF, UNSPECIFIED TEAR EXTENT, UNSPECIFIED WHETHER TRAUMATIC: ICD-10-CM

## 2023-09-11 DIAGNOSIS — Z01.818 PREOP GENERAL PHYSICAL EXAM: Primary | ICD-10-CM

## 2023-09-11 LAB
ERYTHROCYTE [DISTWIDTH] IN BLOOD BY AUTOMATED COUNT: 12.4 % (ref 10–15)
HCT VFR BLD AUTO: 47.1 % (ref 40–53)
HGB BLD-MCNC: 16 G/DL (ref 13.3–17.7)
HOLD SPECIMEN: NORMAL
MCH RBC QN AUTO: 31.2 PG (ref 26.5–33)
MCHC RBC AUTO-ENTMCNC: 34 G/DL (ref 31.5–36.5)
MCV RBC AUTO: 92 FL (ref 78–100)
PLATELET # BLD AUTO: 165 10E3/UL (ref 150–450)
RBC # BLD AUTO: 5.13 10E6/UL (ref 4.4–5.9)
WBC # BLD AUTO: 4.8 10E3/UL (ref 4–11)

## 2023-09-11 PROCEDURE — 99214 OFFICE O/P EST MOD 30 MIN: CPT

## 2023-09-11 PROCEDURE — 36415 COLL VENOUS BLD VENIPUNCTURE: CPT

## 2023-09-11 PROCEDURE — 85027 COMPLETE CBC AUTOMATED: CPT

## 2023-09-11 PROCEDURE — 82306 VITAMIN D 25 HYDROXY: CPT

## 2023-09-11 PROCEDURE — 80048 BASIC METABOLIC PNL TOTAL CA: CPT

## 2023-09-11 RX ORDER — ONDANSETRON 4 MG/1
TABLET, ORALLY DISINTEGRATING ORAL
COMMUNITY
Start: 2023-08-24 | End: 2024-01-15

## 2023-09-11 NOTE — PATIENT INSTRUCTIONS
Preparing for Your Surgery  Getting started  A nurse will call you to review your health history and instructions. They will give you an arrival time based on your scheduled surgery time. Please be ready to share:  Your doctor's clinic name and phone number  Your medical, surgical, and anesthesia history  A list of allergies and sensitivities  A list of medicines, including herbal treatments and over-the-counter drugs  Whether the patient has a legal guardian (ask how to send us the papers in advance)  Please tell us if you're pregnant--or if there's any chance you might be pregnant. Some surgeries may injure a fetus (unborn baby), so they require a pregnancy test. Surgeries that are safe for a fetus don't always need a test, and you can choose whether to have one.   If you have a child who's having surgery, please ask for a copy of Preparing for Your Child's Surgery.    Preparing for surgery  Within 10 to 30 days of surgery: Have a pre-op exam (sometimes called an H&P, or History and Physical). This can be done at a clinic or pre-operative center.  If you're having a , you may not need this exam. Talk to your care team.  At your pre-op exam, talk to your care team about all medicines you take. If you need to stop any medicines before surgery, ask when to start taking them again.  We do this for your safety. Many medicines can make you bleed too much during surgery. Some change how well surgery (anesthesia) drugs work.  Call your insurance company to let them know you're having surgery. (If you don't have insurance, call [phone].)  Call your clinic if there's any change in your health. This includes signs of a cold or flu (sore throat, runny nose, cough, rash, fever). It also includes a scrape or scratch near the surgery site.  If you have questions on the day of surgery, call your hospital or surgery center.  Eating and drinking guidelines  For your safety: Unless your surgeon tells you otherwise,  follow the guidelines below.  Eat and drink as usual until 8 hours before you arrive for surgery. After that, no food or milk.  Drink clear liquids until 2 hours before you arrive. These are liquids you can see through, like water, Gatorade, and Propel Water. They also include plain black coffee and tea (no cream or milk), candy, and breath mints. You can spit out gum when you arrive.  If you drink alcohol: Stop drinking it the night before surgery.  If your care team tells you to take medicine on the morning of surgery, it's okay to take it with a sip of water.  Preventing infection  Shower or bathe the night before and morning of your surgery. Follow the instructions your clinic gave you. (If no instructions, use regular soap.)  Don't shave or clip hair near your surgery site. We'll remove the hair if needed.  Don't smoke or vape the morning of surgery. You may chew nicotine gum up to 2 hours before surgery. A nicotine patch is okay.  Note: Some surgeries require you to completely quit smoking and nicotine. Check with your surgeon.  Your care team will make every effort to keep you safe from infection. We will:  Clean our hands often with soap and water (or an alcohol-based hand rub).  Clean the skin at your surgery site with a special soap that kills germs.  Give you a special gown to keep you warm. (Cold raises the risk of infection.)  Wear special hair covers, masks, gowns and gloves during surgery.  Give antibiotic medicine, if prescribed. Not all surgeries need antibiotics.  What to bring on the day of surgery  Photo ID and insurance card  Copy of your health care directive, if you have one  Glasses and hearing aids (bring cases)  You can't wear contacts during surgery  Inhaler and eye drops, if you use them (tell us about these when you arrive)  CPAP machine or breathing device, if you use them  A few personal items, if spending the night  If you have . . .  A pacemaker, ICD (cardiac defibrillator) or other  implant: Bring the ID card.  An implanted stimulator: Bring the remote control.  A legal guardian: Bring a copy of the certified (court-stamped) guardianship papers.  Please remove any jewelry, including body piercings. Leave jewelry and other valuables at home.  If you're going home the day of surgery  You must have a responsible adult drive you home. They should stay with you overnight as well.  If you don't have someone to stay with you, and you aren't safe to go home alone, we may keep you overnight. Insurance often won't pay for this.  After surgery  If it's hard to control your pain or you need more pain medicine, please call your surgeon's office.  Questions?   If you have any questions for your care team, list them here: _________________________________________________________________________________________________________________________________________________________________________ ____________________________________ ____________________________________ ____________________________________    How to Take Your Medication Before Surgery  - STOP taking all vitamins and herbal supplements 14 days before surgery.    Tylenol - OK for pain until surgery    NOT be taken for at least 4 days before surgery:  Ibuprofen, advil, motrin, naproxen/aleve, mobix/meloxicam, aspirin, toradol/ketoralac cause stomach upset/ulcers  - famotidine (pepcid) is good for if needed heartburn, over the counter, once daily in the afternoon/evening.     Miralax (polyethylene glycol) if needed for constipation  Senna docusate (stimulant laxative if taking narcotics)

## 2023-09-11 NOTE — PROGRESS NOTES
[address] [address]  Phone: [phone]  Primary Provider: Acacia Petersen  Pre-op Performing Provider: PRISCILA GUNTER      PREOPERATIVE EVALUATION:  Today's date: 9/11/2023    Bj Philip is a 52 year old male who presents for a preoperative evaluation.      9/11/2023     8:54 AM   Additional Questions   Roomed by Adal SÁNCHEZ MA       Surgical Information:  Surgery/Procedure: Right shoulder decompression surgery   Surgery Location: [facility]  Surgeon: Dr. Branahm   Surgery Date: 09/14/23  Time of Surgery: TBD  Where patient plans to recover: At home with family  Fax number for surgical facility: [phone]    Assessment & Plan     The proposed surgical procedure is considered INTERMEDIATE risk.    Preop general physical exam  - Basic Metabolic Panel; Future  - CBC w/ Reflex to Iron Studies; Future    Tear of right rotator cuff, unspecified tear extent, unspecified whether traumatic   Subacute, has tried PT, injections, has upcoming surgery.    Vitamin D deficiency  Historical, rechecking.  - Vitamin D Deficiency; Future            - No identified additional risk factors other than previously addressed    Antiplatelet or Anticoagulation Medication Instructions:   - Patient is on no antiplatelet or anticoagulation medications.    Additional Medication Instructions:  Patient is on no additional chronic medications    RECOMMENDATION:  APPROVAL GIVEN to proceed with proposed procedure, without further diagnostic evaluation.    Ordering of each unique test        Subjective       HPI related to upcoming procedure: injured right shoulder from falling, March 2023. Has done PT, procedures, injections. No help. Pain is improved but still significant.         9/10/2023    10:00 AM   Preop Questions   1. Have you ever had a heart attack or stroke? No   2. Have you ever had surgery on your heart or blood vessels, such  as a stent placement, a coronary artery bypass, or surgery on an artery in your head, neck, heart, or legs? No   3. Do you have chest pain with activity? No   4. Do you have a history of  heart failure? No   5. Do you currently have a cold, bronchitis or symptoms of other infection? No   6. Do you have a cough, shortness of breath, or wheezing? No   7. Do you or anyone in your family have previous history of blood clots? No   8. Do you or does anyone in your family have a serious bleeding problem such as prolonged bleeding following surgeries or cuts? No   9. Have you ever had problems with anemia or been told to take iron pills? No   10. Have you had any abnormal blood loss such as black, tarry or bloody stools? No   11. Have you ever had a blood transfusion? No   12. Are you willing to have a blood transfusion if it is medically needed before, during, or after your surgery? Yes   13. Have you or any of your relatives ever had problems with anesthesia? No   14. Do you have sleep apnea, excessive snoring or daytime drowsiness? No   15. Do you have any artifical heart valves or other implanted medical devices like a pacemaker, defibrillator, or continuous glucose monitor? No   16. Do you have artificial joints? No   17. Are you allergic to latex? No       Health Care Directive:  Patient does not have a Health Care Directive or Living Will: Discussed advance care planning with patient; however, patient declined at this time.    Preoperative Review of :   reviewed - no record of controlled substances prescribed.      Status of Chronic Conditions:  See problem list for active medical problems.  Problems all longstanding and stable, except as noted/documented.  See ROS for pertinent symptoms related to these conditions.    Review of Systems  CONSTITUTIONAL: NEGATIVE for fever, chills, change in weight  INTEGUMENTARY/SKIN: NEGATIVE for worrisome rashes, moles or lesions  EYES: NEGATIVE for vision changes or  irritation  ENT/MOUTH: NEGATIVE for ear, mouth and throat problems  RESP: NEGATIVE for significant cough or SOB  CV: NEGATIVE for chest pain, palpitations or peripheral edema  GI: NEGATIVE for nausea, abdominal pain, heartburn, or change in bowel habits  : NEGATIVE for frequency, dysuria, or hematuria  MUSCULOSKELETAL: NEGATIVE for significant arthralgias or myalgia  NEURO: NEGATIVE for weakness, dizziness or paresthesias  ENDOCRINE: NEGATIVE for temperature intolerance, skin/hair changes  HEME: NEGATIVE for bleeding problems  PSYCHIATRIC: NEGATIVE for changes in mood or affect    Patient Active Problem List    Diagnosis Date Noted    Tendinopathy of left gluteus medius 08/31/2023     Priority: Medium    Acute pain of right shoulder 04/28/2023     Priority: Medium    Acute pain of left shoulder 11/15/2017     Priority: Medium    Erectile dysfunction, unspecified erectile dysfunction type 10/18/2017     Priority: Medium    Labral tear of shoulder, left, initial encounter 05/24/2017     Priority: Medium    S/P arthroscopy of shoulder 05/09/2017     Priority: Medium    Vitamin D deficiency 02/19/2017     Priority: Medium    Scapular dyskinesis 06/05/2014     Priority: Medium    Seasonal allergies      Priority: Medium    CARDIOVASCULAR SCREENING; LDL GOAL LESS THAN 160 10/31/2010     Priority: Medium      Past Medical History:   Diagnosis Date    Pain     left thumb    Seasonal allergies      Past Surgical History:   Procedure Laterality Date    ARTHROPLASTY CARPOMETACARPAL (THUMB JOINT)      LT revision, Dr. Alok Rivera    ARTHROPLASTY CARPOMETACARPAL (THUMB JOINT), ARTHRODESIS, COMBINED  8/4/2011    Procedure:COMBINED ARTHROPLASTY CARPOMETACARPAL (THUMB JOINT), ARTHRODESIS; Metacarpalphalangeal Joint Fusion; Surgeon:ABNER GONZALEZ; Location:[facility]    ARTHROSCOPY SHOULDER RT/LT  8/10    LT decompression of impingement    COLONOSCOPY  01/25/2021    COLONOSCOPY N/A 1/25/2021    Procedure: Colonoscopy, With  "Polypectomy And Biopsy;  Surgeon: Abiola Amin DO;  Location: [facility]    COLONOSCOPY WITH CO2 INSUFFLATION N/A 1/25/2021    Procedure: COLONOSCOPY, WITH CO2 INSUFFLATION;  Surgeon: Abiola Amin DO;  Location: [facility]    COMBINED ESOPHAGOSCOPY, GASTROSCOPY, DUODENOSCOPY (EGD) WITH CO2 INSUFFLATION N/A 1/25/2021    Procedure: ESOPHAGOGASTRODUODENOSCOPY, WITH CO2 INSUFFLATION;  Surgeon: Abiola Amin DO;  Location: [facility]    ESOPHAGOSCOPY, GASTROSCOPY, DUODENOSCOPY (EGD), COMBINED N/A 1/25/2021    Procedure: Esophagogastroduodenoscopy, With Biopsy;  Surgeon: Abiola Amin DO;  Location: [facility]    REPAIR GAMEKEEPER'S THUMB  8/4/2011    Procedure:REPAIR LIGAMENT ULNAR COLLATERAL THUMB (GAMEKEEPER'S); Thumb Radial Reconstruction    ; Surgeon:ABNER GONZALEZ; Location:[facility]     Current Outpatient Medications   Medication Sig Dispense Refill    ondansetron (ZOFRAN ODT) 4 MG ODT tab       methocarbamol (ROBAXIN) 500 MG tablet Take 1 tablet (500 mg) by mouth 4 times daily as needed for muscle spasms (back paiun) (Patient not taking: Reported on 9/11/2023) 30 tablet 0       Allergies   Allergen Reactions    Seasonal Allergies         Social History     Tobacco Use    Smoking status: Never    Smokeless tobacco: Never   Substance Use Topics    Alcohol use: No     Family History   Problem Relation Age of Onset    Anesthesia Reaction No family hx of     Blood Disease No family hx of     Hypertension No family hx of     Cerebrovascular Disease No family hx of     C.A.D. No family hx of     Diabetes No family hx of     Cancer No family hx of      History   Drug Use No         Objective     /89 (BP Location: Left arm, Patient Position: Chair, Cuff Size: Adult Large)   Pulse 66   Temp 98.3  F (36.8  C) (Oral)   Resp 16   Ht 1.899 m (6' 2.75\")   Wt 102.2 kg (225 lb 6.4 oz)   SpO2 96%   BMI 28.36 kg/m      Physical Exam    GENERAL APPEARANCE: healthy, alert and no distress     EYES: EOMI,  PERRL     HENT: ear " canals and TM's normal and nose and mouth without ulcers or lesions     NECK: no adenopathy, no asymmetry, masses, or scars and thyroid normal to palpation     RESP: lungs clear to auscultation - no rales, rhonchi or wheezes     CV: regular rates and rhythm, normal S1 S2, no S3 or S4 and no murmur, click or rub     ABDOMEN:  soft, nontender, no HSM or masses and bowel sounds normal     MS: extremities normal- no gross deformities noted, no evidence of inflammation in joints, FROM in all extremities.     SKIN: no suspicious lesions or rashes     NEURO: Normal strength and tone, sensory exam grossly normal, mentation intact and speech normal     PSYCH: mentation appears normal. and affect normal/bright     LYMPHATICS: No cervical adenopathy    No results for input(s): HGB, PLT, INR, NA, POTASSIUM, CR, A1C in the last 11815 hours.     Diagnostics:  Labs pending at this time.  Results will be reviewed when available.   No EKG required, no history of coronary heart disease, significant arrhythmia, peripheral arterial disease or other structural heart disease.    Revised Cardiac Risk Index (RCRI):  The patient has the following serious cardiovascular risks for perioperative complications:   - No serious cardiac risks = 0 points     RCRI Interpretation: 0 points: Class I (very low risk - 0.4% complication rate)         Signed Electronically by: ARPIT Torrez CNP  Copy of this evaluation report is provided to requesting physician.

## 2023-09-12 LAB
ANION GAP SERPL CALCULATED.3IONS-SCNC: 10 MMOL/L (ref 7–15)
BUN SERPL-MCNC: 19.3 MG/DL (ref 6–20)
CALCIUM SERPL-MCNC: 9.5 MG/DL (ref 8.6–10)
CHLORIDE SERPL-SCNC: 105 MMOL/L (ref 98–107)
CREAT SERPL-MCNC: 1.34 MG/DL (ref 0.67–1.17)
DEPRECATED CALCIDIOL+CALCIFEROL SERPL-MC: 15 UG/L (ref 20–75)
DEPRECATED HCO3 PLAS-SCNC: 25 MMOL/L (ref 22–29)
EGFRCR SERPLBLD CKD-EPI 2021: 65 ML/MIN/1.73M2
GLUCOSE SERPL-MCNC: 109 MG/DL (ref 70–99)
POTASSIUM SERPL-SCNC: 4.2 MMOL/L (ref 3.4–5.3)
SODIUM SERPL-SCNC: 140 MMOL/L (ref 136–145)

## 2023-09-14 ENCOUNTER — TRANSFERRED RECORDS (OUTPATIENT)
Dept: HEALTH INFORMATION MANAGEMENT | Facility: CLINIC | Age: 53
End: 2023-09-14
Payer: COMMERCIAL

## 2023-09-26 ENCOUNTER — TRANSFERRED RECORDS (OUTPATIENT)
Dept: HEALTH INFORMATION MANAGEMENT | Facility: CLINIC | Age: 53
End: 2023-09-26
Payer: COMMERCIAL

## 2023-10-04 ENCOUNTER — THERAPY VISIT (OUTPATIENT)
Dept: PHYSICAL THERAPY | Facility: CLINIC | Age: 53
End: 2023-10-04
Payer: OTHER MISCELLANEOUS

## 2023-10-04 DIAGNOSIS — Z47.89 AFTERCARE FOLLOWING SURGERY OF THE MUSCULOSKELETAL SYSTEM, NEC: ICD-10-CM

## 2023-10-04 DIAGNOSIS — M25.511 PAIN IN JOINT OF RIGHT SHOULDER: Primary | ICD-10-CM

## 2023-10-04 PROCEDURE — 97161 PT EVAL LOW COMPLEX 20 MIN: CPT | Mod: GP | Performed by: PHYSICAL THERAPIST

## 2023-10-04 PROCEDURE — 97112 NEUROMUSCULAR REEDUCATION: CPT | Mod: GP | Performed by: PHYSICAL THERAPIST

## 2023-10-04 PROCEDURE — 97110 THERAPEUTIC EXERCISES: CPT | Mod: GP | Performed by: PHYSICAL THERAPIST

## 2023-10-04 NOTE — PROGRESS NOTES
PHYSICAL THERAPY EVALUATION  Type of Visit: Evaluation    See electronic medical record for Abuse and Falls Screening details.    Subjective       Presenting condition or subjective complaint:  pt reports that his R shoulder pain started when he slipped on the ice at work on 3-31-23. He was dealing with pain and tried PT and helped with mobility but not as much with pain so he had a decompression and ED on 9-14-23 with Dr. Branham. PMH: biceps tenodesis 2016.     Date of onset: 09/14/23    Relevant medical history:   hx of OA on bilateral shoulders  Dates & types of surgery:      Prior diagnostic imaging/testing results:     MRIx, x-ray  Prior therapy history for the same diagnosis, illness or injury:    PT multiple times for both shoulders. He also has OA on L shoulder            Pain assessment: See objective evaluation for additional pain details     Objective   Pain:   Location: anterior, lateral, UT  At rest: 3/10 at rest but can get to a 6-7/10 at night  With activity: 10/10  Quality: dull ache, sharp,   Frequency: Intermittent sharp but dull ache is constant  Time of Day: worse in the nighttime  Pain is exacerbated by: reaching overhead, out to side, lying on shoulder and still is sleeping in the recliner  Pain is relieved by: rest, ice, anti-inflammatories and neck exercises    Range of Motion:     AROM L shoulder: 145/140/T9/58   AAROM R shoulder: flexion table slide= 118, abd table vrwoa=143, table ER = 41    Strength:  L shoulder: not tested    R shoulder: not tested due to lack of AROM and pain    Notable mechanics: R scapular medial border prominence, winging and upward translation with flexion and abduction.   Special Tests: not tested   Palpation: Tension and hypertonicity noted at R UT, LS, rhomboids  Posture: Fwd head, rounded shoulders and elevated scapula on R      Assessment & Plan   CLINICAL IMPRESSIONS  Medical Diagnosis: s/p R SAD and ED    Treatment Diagnosis: R shoulder pain    Impression/Assessment: Patient is a 52 year old male with R shoulder complaints.  The following significant findings have been identified: Pain, Decreased ROM/flexibility, Decreased joint mobility, and Decreased strength. These impairments interfere with their ability to perform self care tasks, work tasks, recreational activities, household chores, and driving  as compared to previous level of function.     Clinical Decision Making (Complexity):  Clinical Presentation: stable  Clinical Presentation Rationale: based on medical and personal factors listed in PT evaluation  Clinical Decision Making (Complexity): Low complexity    PLAN OF CARE  Treatment Interventions:  Interventions: Manual Therapy, Neuromuscular Re-education, Therapeutic Activity, Therapeutic Exercise    Long Term Goals     PT Goal 1  Goal Identifier: reaching  Goal Description: improve AROM and strength to allow pt to reach with 1-2# to top shelf of [place] cabinet without pain or hiking  Rationale: to maximize safety and independence with performance of ADLs and functional tasks;to maximize safety and independence within the home;to maximize safety and independence within the community  Target Date: 12/27/23      Frequency of Treatment: 1x/wk for 4 weeks then every other week for 8 weeks  Duration of Treatment: 12 weeks    Recommended Referrals to Other Professionals: Physical Therapy  Education Assessment:        Risks and benefits of evaluation/treatment have been explained.   Patient/Family/caregiver agrees with Plan of Care.     Evaluation Time:     PT Eval, Low Complexity Minutes (65373): 20       Signing Clinician: Oneida Lopez PT

## 2023-10-19 ENCOUNTER — THERAPY VISIT (OUTPATIENT)
Dept: PHYSICAL THERAPY | Facility: CLINIC | Age: 53
End: 2023-10-19
Payer: OTHER MISCELLANEOUS

## 2023-10-19 DIAGNOSIS — M25.511 PAIN IN JOINT OF RIGHT SHOULDER: Primary | ICD-10-CM

## 2023-10-19 DIAGNOSIS — Z47.89 AFTERCARE FOLLOWING SURGERY OF THE MUSCULOSKELETAL SYSTEM, NEC: ICD-10-CM

## 2023-10-19 PROCEDURE — 97110 THERAPEUTIC EXERCISES: CPT | Mod: GP | Performed by: PHYSICAL THERAPIST

## 2023-10-19 PROCEDURE — 97140 MANUAL THERAPY 1/> REGIONS: CPT | Mod: GP | Performed by: PHYSICAL THERAPIST

## 2023-10-30 ENCOUNTER — THERAPY VISIT (OUTPATIENT)
Dept: PHYSICAL THERAPY | Facility: CLINIC | Age: 53
End: 2023-10-30
Payer: OTHER MISCELLANEOUS

## 2023-10-30 DIAGNOSIS — M25.511 PAIN IN JOINT OF RIGHT SHOULDER: Primary | ICD-10-CM

## 2023-10-30 DIAGNOSIS — Z47.89 AFTERCARE FOLLOWING SURGERY OF THE MUSCULOSKELETAL SYSTEM, NEC: ICD-10-CM

## 2023-10-30 PROCEDURE — 97112 NEUROMUSCULAR REEDUCATION: CPT | Mod: GP | Performed by: PHYSICAL THERAPIST

## 2023-10-30 PROCEDURE — 97140 MANUAL THERAPY 1/> REGIONS: CPT | Mod: GP | Performed by: PHYSICAL THERAPIST

## 2023-10-30 PROCEDURE — 97110 THERAPEUTIC EXERCISES: CPT | Mod: GP | Performed by: PHYSICAL THERAPIST

## 2023-11-06 ENCOUNTER — THERAPY VISIT (OUTPATIENT)
Dept: PHYSICAL THERAPY | Facility: CLINIC | Age: 53
End: 2023-11-06
Payer: OTHER MISCELLANEOUS

## 2023-11-06 DIAGNOSIS — M25.511 PAIN IN JOINT OF RIGHT SHOULDER: Primary | ICD-10-CM

## 2023-11-06 DIAGNOSIS — Z47.89 AFTERCARE FOLLOWING SURGERY OF THE MUSCULOSKELETAL SYSTEM, NEC: ICD-10-CM

## 2023-11-06 PROCEDURE — 97140 MANUAL THERAPY 1/> REGIONS: CPT | Mod: GP | Performed by: PHYSICAL THERAPIST

## 2023-11-06 PROCEDURE — 97110 THERAPEUTIC EXERCISES: CPT | Mod: GP | Performed by: PHYSICAL THERAPIST

## 2023-11-06 PROCEDURE — 97112 NEUROMUSCULAR REEDUCATION: CPT | Mod: GP | Performed by: PHYSICAL THERAPIST

## 2023-11-20 ENCOUNTER — THERAPY VISIT (OUTPATIENT)
Dept: PHYSICAL THERAPY | Facility: CLINIC | Age: 53
End: 2023-11-20
Payer: OTHER MISCELLANEOUS

## 2023-11-20 DIAGNOSIS — Z47.89 AFTERCARE FOLLOWING SURGERY OF THE MUSCULOSKELETAL SYSTEM, NEC: ICD-10-CM

## 2023-11-20 DIAGNOSIS — M25.511 PAIN IN JOINT OF RIGHT SHOULDER: Primary | ICD-10-CM

## 2023-11-20 PROCEDURE — 97140 MANUAL THERAPY 1/> REGIONS: CPT | Mod: GP | Performed by: PHYSICAL THERAPIST

## 2023-11-20 PROCEDURE — 97110 THERAPEUTIC EXERCISES: CPT | Mod: GP | Performed by: PHYSICAL THERAPIST

## 2023-11-20 PROCEDURE — 97112 NEUROMUSCULAR REEDUCATION: CPT | Mod: GP | Performed by: PHYSICAL THERAPIST

## 2023-12-01 ENCOUNTER — TRANSFERRED RECORDS (OUTPATIENT)
Dept: HEALTH INFORMATION MANAGEMENT | Facility: CLINIC | Age: 53
End: 2023-12-01
Payer: COMMERCIAL

## 2023-12-08 ENCOUNTER — THERAPY VISIT (OUTPATIENT)
Dept: PHYSICAL THERAPY | Facility: CLINIC | Age: 53
End: 2023-12-08
Payer: OTHER MISCELLANEOUS

## 2023-12-08 DIAGNOSIS — Z47.89 AFTERCARE FOLLOWING SURGERY OF THE MUSCULOSKELETAL SYSTEM, NEC: ICD-10-CM

## 2023-12-08 DIAGNOSIS — M25.511 PAIN IN JOINT OF RIGHT SHOULDER: Primary | ICD-10-CM

## 2023-12-08 PROCEDURE — 97110 THERAPEUTIC EXERCISES: CPT | Mod: GP | Performed by: PHYSICAL THERAPIST

## 2023-12-08 PROCEDURE — 97112 NEUROMUSCULAR REEDUCATION: CPT | Mod: GP | Performed by: PHYSICAL THERAPIST

## 2023-12-08 PROCEDURE — 97140 MANUAL THERAPY 1/> REGIONS: CPT | Mod: GP | Performed by: PHYSICAL THERAPIST

## 2023-12-08 NOTE — PROGRESS NOTES
"    PLAN  Continue therapy per current plan of care.   12/08/23 0500   Appointment Info   Signing clinician's name / credentials Roberto Beltran DPT   Total/Authorized Visits 12 ([facility]) WC (8)   Visits Used 6   Medical Diagnosis s/p R SAD and ED   PT Tx Diagnosis R shoulder pain   Precautions/Limitations sling x 3 days   Other pertinent information advanced chondrol full thickness loss on HH and glenoid   Progress Note/Certification   Onset of illness/injury or Date of Surgery 09/14/23   Therapy Frequency 1x/wk for 4 weeks then every other week for 8 weeks   Predicted Duration 12 weeks   Progress Note Due Date 11/29/23   PT Goal 1   Goal Identifier reaching   Goal Description improve AROM and strength to allow pt to reach with 1-2# to top shelf of [place] cabinet without pain or hiking   Rationale to maximize safety and independence with performance of ADLs and functional tasks;to maximize safety and independence within the home;to maximize safety and independence within the community   Goal Progress see objective section below for progress   Target Date 12/27/23   Subjective Report   Subjective Report Things going well. Pain here and there. Saw MD and planning R pyrocarbon hemiarthroplasty on 1/17/24   Objective Measures   Objective Measures Objective Measure 2   Objective Measure 1   Objective Measure R shoulder ROM   Details AROM 160/158/T2/T9   Objective Measure 2   Objective Measure R shoulder strength   Details flexion 4+/5, abduction 4/5, ER 5/5   Treatment Interventions (PT)   Interventions Therapeutic Procedure/Exercise;Manual Therapy;Neuromuscular Re-education   Therapeutic Procedure/Exercise   Therapeutic Procedures: strength, endurance, ROM, flexibillity minutes (81597) 12   PTRx Ther Proc 1 Seated Cervical Retraction Extension With Overpressure   PTRx Ther Proc 1 - Details HEP   PTRx Ther Proc 2 Four Corner Stretch Flexion   PTRx Ther Proc 2 - Details 10 x 5\" prior to measurment   PTRx Ther Proc 3 Wand " Shoulder Abduction Standing   PTRx Ther Proc 3 - Details x10   PTRx Ther Proc 4 Four Corner Stretch External Rotation at Side   PTRx Ther Proc 4 - Details x12   PTRx Ther Proc 5 Wand Shoulder External Rotation in Neutral   PTRx Ther Proc 5 - Details HEP   PTRx Ther Proc 6 Shoulder Scaption Full Can   PTRx Ther Proc 6 - Details x 15 alternating with flexion 1 lb back against wall   PTRx Ther Proc 7 Supine Ceiling Punch   PTRx Ther Proc 7 - Details rev   PTRx Ther Proc 8 Prone Shoulder Extension   PTRx Ther Proc 8 - Details 2 lb x 20   PTRx Ther Proc 9 Shoulder External Rotation Sidelying   PTRx Ther Proc 9 - Details 1 lb x    Neuromuscular Re-education   Neuromuscular re-ed of mvmt, balance, coord, kinesthetic sense, posture, proprioception minutes (69946) 14   PTRx Neuro Re-ed 1 Shoulder Flexion   PTRx Neuro Re-ed 1 - Details x 15 alternating with scaption 1 lb against wall cues to avoid shrug   PTRx Neuro Re-ed 2 Prone Shoulder Horizontal Abduction   PTRx Neuro Re-ed 2 - Details 1 lb x 20 (pt was trying 2 lbs at home=too much UT hiking) cues for scap depression   PTRx Neuro Re-ed 3 Push-Up Plus At Counter   PTRx Neuro Re-ed 3 - Details x20 with NMR cuing for scap ret/dep low counter   Patient Response/Progress continued need for cueing to avoid UT activation   Manual Therapy   Manual Therapy: Mobilization, MFR, MLD, friction massage minutes (34763) 10   Manual Therapy 1 sitting TFM to R UT/LS   Manual Therapy 1 - Details 10'   Patient Response/Progress tolerated well, tender during   Plan   Home program see ptrx   Updates to plan of care [facility] progression   Plan for next session add proprio   Total Session Time   Timed Code Treatment Minutes 36   Total Treatment Time (sum of timed and untimed services) 36         Beginning/End Dates of Progress Note Reporting Period:    to 12/08/2023    Referring Provider:  Kyle Branham

## 2024-01-05 ENCOUNTER — TELEPHONE (OUTPATIENT)
Dept: FAMILY MEDICINE | Facility: CLINIC | Age: 54
End: 2024-01-05
Payer: COMMERCIAL

## 2024-01-05 NOTE — TELEPHONE ENCOUNTER
----- Message from ARPIT Hyde CNP sent at 1/5/2024 12:30 PM CST -----  Team - please call patient with prior labs remind him he should be taking a vitamin D supplement, he should be following with his PCP for monitoring kidney function, but was stable for his prior surgery.

## 2024-01-05 NOTE — TELEPHONE ENCOUNTER
RN relayed information. Patient had no further questions.     Marietta Casiano RN on 1/5/2024 at 12:43 PM

## 2024-01-15 ENCOUNTER — OFFICE VISIT (OUTPATIENT)
Dept: FAMILY MEDICINE | Facility: CLINIC | Age: 54
End: 2024-01-15
Payer: COMMERCIAL

## 2024-01-15 VITALS
BODY MASS INDEX: 28.4 KG/M2 | OXYGEN SATURATION: 97 % | WEIGHT: 233.2 LBS | HEIGHT: 76 IN | TEMPERATURE: 98.3 F | HEART RATE: 84 BPM | RESPIRATION RATE: 18 BRPM | DIASTOLIC BLOOD PRESSURE: 84 MMHG | SYSTOLIC BLOOD PRESSURE: 124 MMHG

## 2024-01-15 DIAGNOSIS — Z01.818 PREOP GENERAL PHYSICAL EXAM: Primary | ICD-10-CM

## 2024-01-15 DIAGNOSIS — M19.011 OSTEOARTHRITIS OF RIGHT SHOULDER, UNSPECIFIED OSTEOARTHRITIS TYPE: ICD-10-CM

## 2024-01-15 DIAGNOSIS — Z98.890 S/P ARTHROSCOPY OF SHOULDER: ICD-10-CM

## 2024-01-15 PROCEDURE — 99214 OFFICE O/P EST MOD 30 MIN: CPT | Performed by: FAMILY MEDICINE

## 2024-01-15 NOTE — PROGRESS NOTES
[address] [address]  Phone: [phone]  Primary Provider: Niurka Petersen  Pre-op Performing Provider: NIURKA PETERSEN      PREOPERATIVE EVALUATION:  Today's date: 1/15/2024    Amado is a 53 year old, presenting for the following:  Pre-Op Exam        1/15/2024     8:29 AM   Additional Questions   Roomed by Annabella   Accompanied by Self       Surgical Information:  Surgery/Procedure: RIGHT SHOULDER PYROCARBON HEMIARTHROPLASTY   Surgery Location: [facility]   Surgeon: Kyle Branham MD   Surgery Date: 1/17/2024  Time of Surgery: TBD  Where patient plans to recover: At home with family  Fax number for surgical facility: Note does not need to be faxed, will be available electronically in Epic.    Assessment & Plan     The proposed surgical procedure is considered INTERMEDIATE risk.    Preop general physical exam    Osteoarthritis of right shoulder, unspecified osteoarthritis type    S/P arthroscopy of shoulder        - No identified additional risk factors other than previously addressed    Antiplatelet or Anticoagulation Medication Instructions:   - Patient is on no antiplatelet or anticoagulation medications.    Additional Medication Instructions:  Patient is on no additional chronic medications    RECOMMENDATION:  APPROVAL GIVEN to proceed with proposed procedure, without further diagnostic evaluation.      Subjective       HPI related to upcoming procedure:   Longstanding progressive issues with right shoulder.  Status post arthroscopy in September for decompression, etc. found to have significant osteoarthritis.  Planned partial joint replacement.        1/15/2024     8:27 AM   Preop Questions   1. Have you ever had a heart attack or stroke? No   2. Have you ever had surgery on your heart or blood vessels, such as a stent placement, a coronary artery bypass, or surgery on an artery in your head, neck, heart,  or legs? No   3. Do you have chest pain with activity? No   4. Do you have a history of  heart failure? No   5. Do you currently have a cold, bronchitis or symptoms of other infection? No   6. Do you have a cough, shortness of breath, or wheezing? No   7. Do you or anyone in your family have previous history of blood clots? No   8. Do you or does anyone in your family have a serious bleeding problem such as prolonged bleeding following surgeries or cuts? No   9. Have you ever had problems with anemia or been told to take iron pills? No   10. Have you had any abnormal blood loss such as black, tarry or bloody stools? No   11. Have you ever had a blood transfusion? No   12. Are you willing to have a blood transfusion if it is medically needed before, during, or after your surgery? Yes   13. Have you or any of your relatives ever had problems with anesthesia? No   14. Do you have sleep apnea, excessive snoring or daytime drowsiness? No   15. Do you have any artifical heart valves or other implanted medical devices like a pacemaker, defibrillator, or continuous glucose monitor? No   16. Do you have artificial joints? No   17. Are you allergic to latex? No       Health Care Directive:  Patient does not have a Health Care Directive or Living Will:     Preoperative Review of :   reviewed - controlled substances reflected in medication list.      Status of Chronic Conditions:  See problem list for active medical problems.  Problems all longstanding and stable, except as noted/documented.  See ROS for pertinent symptoms related to these conditions.    Review of Systems  CONSTITUTIONAL: NEGATIVE for fever, chills, change in weight  INTEGUMENTARY/SKIN: NEGATIVE for worrisome rashes, moles or lesions  EYES: NEGATIVE for vision changes or irritation  ENT/MOUTH: NEGATIVE for ear, mouth and throat problems  RESP: NEGATIVE for significant cough or SOB  CV: NEGATIVE for chest pain, palpitations or peripheral edema  GI:  NEGATIVE for nausea, abdominal pain, heartburn, or change in bowel habits  : NEGATIVE for frequency, dysuria, or hematuria  MUSCULOSKELETAL: NEGATIVE for significant arthralgias or myalgia  NEURO: NEGATIVE for weakness, dizziness or paresthesias  ENDOCRINE: NEGATIVE for temperature intolerance, skin/hair changes  HEME: NEGATIVE for bleeding problems  PSYCHIATRIC: NEGATIVE for changes in mood or affect    Patient Active Problem List    Diagnosis Date Noted    Osteoarthritis of right shoulder, unspecified osteoarthritis type 01/15/2024     Priority: Medium    Pain in joint of right shoulder 10/04/2023     Priority: Medium    Aftercare following surgery of the musculoskeletal system, NEC 10/04/2023     Priority: Medium    Tendinopathy of left gluteus medius 08/31/2023     Priority: Medium    Acute pain of left shoulder 11/15/2017     Priority: Medium    Erectile dysfunction, unspecified erectile dysfunction type 10/18/2017     Priority: Medium    Labral tear of shoulder, left, initial encounter 05/24/2017     Priority: Medium    S/P arthroscopy of shoulder 05/09/2017     Priority: Medium    Vitamin D deficiency 02/19/2017     Priority: Medium    Scapular dyskinesis 06/05/2014     Priority: Medium    Seasonal allergies      Priority: Medium    CARDIOVASCULAR SCREENING; LDL GOAL LESS THAN 160 10/31/2010     Priority: Medium      Past Medical History:   Diagnosis Date    Pain     left thumb    Seasonal allergies      Past Surgical History:   Procedure Laterality Date    ARTHROPLASTY CARPOMETACARPAL (THUMB JOINT)      LT revision, Dr. Alok Rivera    ARTHROPLASTY CARPOMETACARPAL (THUMB JOINT), ARTHRODESIS, COMBINED  8/4/2011    Procedure:COMBINED ARTHROPLASTY CARPOMETACARPAL (THUMB JOINT), ARTHRODESIS; Metacarpalphalangeal Joint Fusion; Surgeon:ABNER GONZALEZ; Location[facility]    ARTHROSCOPY SHOULDER RT/LT  8/10    LT decompression of impingement    COLONOSCOPY  01/25/2021    COLONOSCOPY N/A 1/25/2021    Procedure:  "Colonoscopy, With Polypectomy And Biopsy;  Surgeon: Abiola Amin DO;  Location: [facility]    COLONOSCOPY WITH CO2 INSUFFLATION N/A 1/25/2021    Procedure: COLONOSCOPY, WITH CO2 INSUFFLATION;  Surgeon: Abiola Amin DO;  Location: [facility]    COMBINED ESOPHAGOSCOPY, GASTROSCOPY, DUODENOSCOPY (EGD) WITH CO2 INSUFFLATION N/A 1/25/2021    Procedure: ESOPHAGOGASTRODUODENOSCOPY, WITH CO2 INSUFFLATION;  Surgeon: Abiola Amin DO;  Location: [facility]    ESOPHAGOSCOPY, GASTROSCOPY, DUODENOSCOPY (EGD), COMBINED N/A 1/25/2021    Procedure: Esophagogastroduodenoscopy, With Biopsy;  Surgeon: Abiola Amin DO;  Location: [facility]    REPAIR GAMEKEEPER'S THUMB  8/4/2011    Procedure:REPAIR LIGAMENT ULNAR COLLATERAL THUMB (GAMEKEEPER'S); Thumb Radial Reconstruction    ; Surgeon:ABNER GONZALEZ; Location:[facility]     No current outpatient medications on file.       Allergies   Allergen Reactions    Seasonal Allergies         Social History     Tobacco Use    Smoking status: Never    Smokeless tobacco: Never   Substance Use Topics    Alcohol use: No     Family History   Problem Relation Age of Onset    Anesthesia Reaction No family hx of     Blood Disease No family hx of     Hypertension No family hx of     Cerebrovascular Disease No family hx of     C.A.D. No family hx of     Diabetes No family hx of     Cancer No family hx of      History   Drug Use No         Objective     /84 (BP Location: Right arm, Patient Position: Sitting, Cuff Size: Adult Large)   Pulse 84   Temp 98.3  F (36.8  C) (Oral)   Resp 18   Ht 1.93 m (6' 4\")   Wt 105.8 kg (233 lb 3.2 oz)   SpO2 97%   BMI 28.39 kg/m      Physical Exam    GENERAL APPEARANCE: healthy, alert and no distress     EYES: EOMI,  PERRL     HENT: ear canals and TM's normal and nose and mouth without ulcers or lesions     NECK: no adenopathy, no asymmetry, masses, or scars and thyroid normal to palpation     RESP: lungs clear to auscultation - no rales, rhonchi or wheezes     CV: " regular rates and rhythm, normal S1 S2, no S3 or S4 and no murmur, click or rub     ABDOMEN:  soft, nontender, no HSM or masses and bowel sounds normal     MS: extremities normal- no gross deformities noted, no evidence of inflammation in joints, FROM in all extremities.     SKIN: no suspicious lesions or rashes     NEURO: Normal strength and tone, sensory exam grossly normal, mentation intact and speech normal     PSYCH: mentation appears normal. and affect normal/bright     LYMPHATICS: No cervical adenopathy    Recent Labs   Lab Test 09/11/23  0941   HGB 16.0         POTASSIUM 4.2   CR 1.34*        Diagnostics:  No labs were ordered during this visit.   No EKG indicated    Revised Cardiac Risk Index (RCRI):  The patient has the following serious cardiovascular risks for perioperative complications:   - No serious cardiac risks = 0 points     RCRI Interpretation: 0 points: Class I (very low risk - 0.4% complication rate)         Signed Electronically by: Acacia Petersen MD  Copy of this evaluation report is provided to requesting physician.

## 2024-01-15 NOTE — PATIENT INSTRUCTIONS
Preparing for Your Surgery  Getting started  A nurse will call you to review your health history and instructions. They will give you an arrival time based on your scheduled surgery time. Please be ready to share:  Your doctor's clinic name and phone number  Your medical, surgical, and anesthesia history  A list of allergies and sensitivities  A list of medicines, including herbal treatments and over-the-counter drugs  Whether the patient has a legal guardian (ask how to send us the papers in advance)  Please tell us if you're pregnant--or if there's any chance you might be pregnant. Some surgeries may injure a fetus (unborn baby), so they require a pregnancy test. Surgeries that are safe for a fetus don't always need a test, and you can choose whether to have one.   If you have a child who's having surgery, please ask for a copy of Preparing for Your Child's Surgery.    Preparing for surgery  Within 10 to 30 days of surgery: Have a pre-op exam (sometimes called an H&P, or History and Physical). This can be done at a clinic or pre-operative center.  If you're having a , you may not need this exam. Talk to your care team.  At your pre-op exam, talk to your care team about all medicines you take. If you need to stop any medicines before surgery, ask when to start taking them again.  We do this for your safety. Many medicines can make you bleed too much during surgery. Some change how well surgery (anesthesia) drugs work.  Call your insurance company to let them know you're having surgery. (If you don't have insurance, call [phone].)  Call your clinic if there's any change in your health. This includes signs of a cold or flu (sore throat, runny nose, cough, rash, fever). It also includes a scrape or scratch near the surgery site.  If you have questions on the day of surgery, call your hospital or surgery center.  Eating and drinking guidelines  For your safety: Unless your surgeon tells you otherwise,  follow the guidelines below.  Eat and drink as usual until 8 hours before you arrive for surgery. After that, no food or milk.  Drink clear liquids until 2 hours before you arrive. These are liquids you can see through, like water, Gatorade, and Propel Water. They also include plain black coffee and tea (no cream or milk), candy, and breath mints. You can spit out gum when you arrive.  If you drink alcohol: Stop drinking it the night before surgery.  If your care team tells you to take medicine on the morning of surgery, it's okay to take it with a sip of water.  Preventing infection  Shower or bathe the night before and morning of your surgery. Follow the instructions your clinic gave you. (If no instructions, use regular soap.)  Don't shave or clip hair near your surgery site. We'll remove the hair if needed.  Don't smoke or vape the morning of surgery. You may chew nicotine gum up to 2 hours before surgery. A nicotine patch is okay.  Note: Some surgeries require you to completely quit smoking and nicotine. Check with your surgeon.  Your care team will make every effort to keep you safe from infection. We will:  Clean our hands often with soap and water (or an alcohol-based hand rub).  Clean the skin at your surgery site with a special soap that kills germs.  Give you a special gown to keep you warm. (Cold raises the risk of infection.)  Wear special hair covers, masks, gowns and gloves during surgery.  Give antibiotic medicine, if prescribed. Not all surgeries need antibiotics.  What to bring on the day of surgery  Photo ID and insurance card  Copy of your health care directive, if you have one  Glasses and hearing aids (bring cases)  You can't wear contacts during surgery  Inhaler and eye drops, if you use them (tell us about these when you arrive)  CPAP machine or breathing device, if you use them  A few personal items, if spending the night  If you have . . .  A pacemaker, ICD (cardiac defibrillator) or other  implant: Bring the ID card.  An implanted stimulator: Bring the remote control.  A legal guardian: Bring a copy of the certified (court-stamped) guardianship papers.  Please remove any jewelry, including body piercings. Leave jewelry and other valuables at home.  If you're going home the day of surgery  You must have a responsible adult drive you home. They should stay with you overnight as well.  If you don't have someone to stay with you, and you aren't safe to go home alone, we may keep you overnight. Insurance often won't pay for this.  After surgery  If it's hard to control your pain or you need more pain medicine, please call your surgeon's office.  Questions?   If you have any questions for your care team, list them here: _________________________________________________________________________________________________________________________________________________________________________ ____________________________________ ____________________________________ ____________________________________  For informational purposes only. Not to replace the advice of your health care provider. Copyright   2003, 2019 [employer]. All rights reserved. Clinically reviewed by Carlene Yeager MD. [employer] 700079 - REV 12/22.    How to Take Your Medication Before Surgery  Patient not on any regularly scheduled medication

## 2024-02-02 ENCOUNTER — TRANSFERRED RECORDS (OUTPATIENT)
Dept: HEALTH INFORMATION MANAGEMENT | Facility: CLINIC | Age: 54
End: 2024-02-02
Payer: COMMERCIAL

## 2024-02-16 ENCOUNTER — THERAPY VISIT (OUTPATIENT)
Dept: PHYSICAL THERAPY | Facility: CLINIC | Age: 54
End: 2024-02-16
Payer: OTHER MISCELLANEOUS

## 2024-02-16 DIAGNOSIS — M25.511 ACUTE PAIN OF RIGHT SHOULDER: Primary | ICD-10-CM

## 2024-02-16 DIAGNOSIS — Z96.611 AFTERCARE FOLLOWING RIGHT SHOULDER JOINT REPLACEMENT SURGERY: ICD-10-CM

## 2024-02-16 DIAGNOSIS — Z47.1 AFTERCARE FOLLOWING RIGHT SHOULDER JOINT REPLACEMENT SURGERY: ICD-10-CM

## 2024-02-16 DIAGNOSIS — Z96.611 PRESENCE OF RIGHT ARTIFICIAL SHOULDER JOINT: ICD-10-CM

## 2024-02-16 PROBLEM — Z47.89 AFTERCARE FOLLOWING SURGERY OF THE MUSCULOSKELETAL SYSTEM, NEC: Status: RESOLVED | Noted: 2023-10-04 | Resolved: 2024-02-16

## 2024-02-16 PROCEDURE — 97110 THERAPEUTIC EXERCISES: CPT | Mod: GP | Performed by: PHYSICAL THERAPIST

## 2024-02-16 PROCEDURE — 97161 PT EVAL LOW COMPLEX 20 MIN: CPT | Mod: GP | Performed by: PHYSICAL THERAPIST

## 2024-02-16 ASSESSMENT — ACTIVITIES OF DAILY LIVING (ADL)
CARRYING_A_HEAVY_OBJECT_OF_10_POUNDS: 10
TOUCHING_THE_BACK_OF_YOUR_NECK: 10
PLACING_AN_OBJECT_ON_A_HIGH_SHELF: 10
PUSHING_WITH_THE_INVOLVED_ARM: 10
WASHING_YOUR_HAIR?: 8
PUTTING_ON_YOUR_PANTS: 5
AT_ITS_WORST?: 5
REACHING_FOR_SOMETHING_ON_A_HIGH_SHELF: 10
WASHING_YOUR_BACK: 10
PUTTING_ON_AN_UNDERSHIRT_OR_A_PULLOVER_SWEATER: 7
PLEASE_INDICATE_YOR_PRIMARY_REASON_FOR_REFERRAL_TO_THERAPY:: SHOULDER
PUTTING_ON_A_SHIRT_THAT_BUTTONS_DOWN_THE_FRONT: 5
WHEN_LYING_ON_THE_INVOLVED_SIDE: 10
REMOVING_SOMETHING_FROM_YOUR_BACK_POCKET: 9

## 2024-02-16 NOTE — PROGRESS NOTES
PHYSICAL THERAPY EVALUATION  Type of Visit: Evaluation    See electronic medical record for Abuse and Falls Screening details.    Subjective       Presenting condition or subjective complaint: pt has had ongoing pain in his R shoulder and had a WC injury in 2010 and then he reinjured in March of 2023 he reinjured his R shoulder and tried PT without success. He had an arthroscopic procedure and BT in Sept of 2023 which wasn't progressing as expected and so he opted for a HH pyrocarbon replacement with Dr. Branham on 1-17-24  Date of onset: 01/17/24    Relevant medical history:     Dates & types of surgery: 01/17/2024 shoulder surgery    Prior diagnostic imaging/testing results: MRI; X-ray     Prior therapy history for the same diagnosis, illness or injury: Yes All of last year        Living Environment  Social support: With family members   Type of home: [facility]   Stairs to enter the home: Yes   Is there a railing: Yes   Ramp: No   Stairs inside the home: Yes   Is there a railing: Yes   Help at home: None  Equipment owned:       Employment: Yes on leave until next MD visit  Hobbies/Interests: Workout    Patient goals for therapy: Work worh my shoulder    Pain assessment: See objective evaluation for additional pain details     Objective     Pain:   Location: anterior, posterior arm  At rest: 1-2/10  With activity: 10/10  Quality: dull ache, sharp  Frequency: Intermittent  Time of Day: worse in am  Pain is exacerbated by: reaching overhead, behind back, lying on shoulder and lifting  Pain is relieved by: rest, ice,     Range of Motion:     AROM L shoulder: 140/140/T10/50   AAROM R shoulder: table slide flexion=92, elbow PROM limited with end range by 10 deg compared to L     Strength:  L shoulder: WNL    R shoulder: NT due to post-op state  Notable mechanics: R scapular medial border prominence, winging and upward translation with flexion.   Special Tests: NT  Palpation: Tension and hypertonicity noted at R UT, LS,  rhomboids  Posture: Fwd head, rounded shoulders     Repeated retraction with extension resolved his pain at rest in the anterior shoulder.       Assessment & Plan   CLINICAL IMPRESSIONS  Medical Diagnosis: s/p R HH pyrocarbon replacement hemiarthroplasty    Treatment Diagnosis: R shoulder pain   Impression/Assessment: Patient is a 53 year old male with R shoulder complaints.  The following significant findings have been identified: Pain, Decreased ROM/flexibility, Decreased joint mobility, and Decreased strength. These impairments interfere with their ability to perform self care tasks, work tasks, and recreational activities as compared to previous level of function.     Clinical Decision Making (Complexity):  Clinical Presentation: Stable/Uncomplicated  Clinical Presentation Rationale: based on medical and personal factors listed in PT evaluation  Clinical Decision Making (Complexity): Low complexity    PLAN OF CARE  Treatment Interventions:  Interventions: Manual Therapy, Neuromuscular Re-education, Therapeutic Activity, Therapeutic Exercise    Long Term Goals     PT Goal 1  Goal Identifier: reaching  Goal Description: improve AROM and strength to tolerate reaching to the top shelf of the [place] cabinet for dishes  Rationale: to maximize safety and independence within the home;to maximize safety and independence with performance of ADLs and functional tasks  Target Date: 05/10/24      Frequency of Treatment: 1x/wk for 8 weeks then every other week for 4 weeks  Duration of Treatment: 12 weeks    Recommended Referrals to Other Professionals: Physical Therapy  Education Assessment:        Risks and benefits of evaluation/treatment have been explained.   Patient/Family/caregiver agrees with Plan of Care.     Evaluation Time:             Signing Clinician: Oneida Lopez, PT

## 2024-02-26 ENCOUNTER — THERAPY VISIT (OUTPATIENT)
Dept: PHYSICAL THERAPY | Facility: CLINIC | Age: 54
End: 2024-02-26
Payer: OTHER MISCELLANEOUS

## 2024-02-26 DIAGNOSIS — Z96.611 AFTERCARE FOLLOWING RIGHT SHOULDER JOINT REPLACEMENT SURGERY: ICD-10-CM

## 2024-02-26 DIAGNOSIS — Z96.611 PRESENCE OF RIGHT ARTIFICIAL SHOULDER JOINT: ICD-10-CM

## 2024-02-26 DIAGNOSIS — M25.511 ACUTE PAIN OF RIGHT SHOULDER: Primary | ICD-10-CM

## 2024-02-26 DIAGNOSIS — Z47.1 AFTERCARE FOLLOWING RIGHT SHOULDER JOINT REPLACEMENT SURGERY: ICD-10-CM

## 2024-02-26 PROCEDURE — 97110 THERAPEUTIC EXERCISES: CPT | Mod: GP | Performed by: PHYSICAL THERAPIST

## 2024-03-13 ENCOUNTER — THERAPY VISIT (OUTPATIENT)
Dept: PHYSICAL THERAPY | Facility: CLINIC | Age: 54
End: 2024-03-13
Payer: OTHER MISCELLANEOUS

## 2024-03-13 DIAGNOSIS — Z96.611 PRESENCE OF RIGHT ARTIFICIAL SHOULDER JOINT: ICD-10-CM

## 2024-03-13 DIAGNOSIS — Z96.611 AFTERCARE FOLLOWING RIGHT SHOULDER JOINT REPLACEMENT SURGERY: ICD-10-CM

## 2024-03-13 DIAGNOSIS — Z47.1 AFTERCARE FOLLOWING RIGHT SHOULDER JOINT REPLACEMENT SURGERY: ICD-10-CM

## 2024-03-13 DIAGNOSIS — M25.511 ACUTE PAIN OF RIGHT SHOULDER: Primary | ICD-10-CM

## 2024-03-13 PROCEDURE — 97110 THERAPEUTIC EXERCISES: CPT | Mod: GP | Performed by: PHYSICAL THERAPIST

## 2024-03-19 ENCOUNTER — THERAPY VISIT (OUTPATIENT)
Dept: PHYSICAL THERAPY | Facility: CLINIC | Age: 54
End: 2024-03-19
Payer: OTHER MISCELLANEOUS

## 2024-03-19 DIAGNOSIS — Z47.1 AFTERCARE FOLLOWING RIGHT SHOULDER JOINT REPLACEMENT SURGERY: ICD-10-CM

## 2024-03-19 DIAGNOSIS — Z96.611 AFTERCARE FOLLOWING RIGHT SHOULDER JOINT REPLACEMENT SURGERY: ICD-10-CM

## 2024-03-19 DIAGNOSIS — M25.511 ACUTE PAIN OF RIGHT SHOULDER: Primary | ICD-10-CM

## 2024-03-19 DIAGNOSIS — Z96.611 PRESENCE OF RIGHT ARTIFICIAL SHOULDER JOINT: ICD-10-CM

## 2024-03-19 PROCEDURE — 97110 THERAPEUTIC EXERCISES: CPT | Mod: GP | Performed by: PHYSICAL THERAPIST

## 2024-03-19 PROCEDURE — 97140 MANUAL THERAPY 1/> REGIONS: CPT | Mod: GP | Performed by: PHYSICAL THERAPIST

## 2024-04-03 ENCOUNTER — THERAPY VISIT (OUTPATIENT)
Dept: PHYSICAL THERAPY | Facility: CLINIC | Age: 54
End: 2024-04-03
Payer: OTHER MISCELLANEOUS

## 2024-04-03 DIAGNOSIS — Z96.611 AFTERCARE FOLLOWING RIGHT SHOULDER JOINT REPLACEMENT SURGERY: ICD-10-CM

## 2024-04-03 DIAGNOSIS — Z96.611 PRESENCE OF RIGHT ARTIFICIAL SHOULDER JOINT: ICD-10-CM

## 2024-04-03 DIAGNOSIS — M25.511 ACUTE PAIN OF RIGHT SHOULDER: Primary | ICD-10-CM

## 2024-04-03 DIAGNOSIS — Z47.1 AFTERCARE FOLLOWING RIGHT SHOULDER JOINT REPLACEMENT SURGERY: ICD-10-CM

## 2024-04-03 PROCEDURE — 97110 THERAPEUTIC EXERCISES: CPT | Mod: GP | Performed by: PHYSICAL THERAPIST

## 2024-04-03 NOTE — PROGRESS NOTES
04/03/24 0500   Appointment Info   Signing clinician's name / credentials Oneida Black DPT, SCS   Total/Authorized Visits 12 ([facility])   Visits Used 5   Medical Diagnosis s/p R shoulder HH pyrocarbon arthroplasty   PT Tx Diagnosis R shoulder pain   Precautions/Limitations april 9   Progress Note/Certification   Onset of illness/injury or Date of Surgery 01/17/24   Therapy Frequency 1x/wk for 8 weeks then every other week for 4 weeks   Predicted Duration 12 weeks   PT Goal 1   Goal Identifier reaching   Goal Description improve AROM and strength to tolerate reaching to the top shelf of the [place] cabinet for dishes   Rationale to maximize safety and independence within the home;to maximize safety and independence with performance of ADLs and functional tasks   Goal Progress see objective data   Target Date 05/10/24   Subjective Report   Subjective Report No new concerns. Pain managed, ROM improving.   Objective Measures   Objective Measures Objective Measure 2   Objective Measure 1   Objective Measure R shoulder AAROM:   Details wand standing [id number]  and the same on the wall, ABD=124 in scaption, EXT=72, ER wand supine=67   Objective Measure 2   Objective Measure palpation   Details increased tone R UT   Treatment Interventions (PT)   Interventions Therapeutic Procedure/Exercise;Therapeutic Activity;Neuromuscular Re-education;Manual Therapy   Therapeutic Procedure/Exercise   Therapeutic Procedures: strength, endurance, ROM, flexibility minutes (96682) 25   PTRx Ther Proc 1 Seated Cervical Retraction Extension With Overpressure   PTRx Ther Proc 1 - Details x 5 for review focus on end range   PTRx Ther Proc 2 Pendulum/Codmans   PTRx Ther Proc 2 - Details 20   PTRx Ther Proc 3 Wand Shoulder Abduction Standing   PTRx Ther Proc 3 - Details 15   PTRx Ther Proc 4 Wand Shoulder Extension Standing   PTRx Ther Proc 4 - Details 15   PTRx Ther Proc 5 Wand Shoulder Flexion in Standing   PTRx Ther Proc 5 - Details 15    PTRx Ther Proc 6 Wand Shoulder External Rotation in Neutral   PTRx Ther Proc 6 - Details No Notes   PTRx Ther Proc 7 Reverse Pendulum Supine or Sidelying   PTRx Ther Proc 7 - Details 1'   PTRx Ther Proc 8 Supine Ceiling Punch   PTRx Ther Proc 8 - Details x25 just the punch because it was sore trying to eccentrically load the biceps but when he was cued to use the lat to pull his arm back down it felt better   PTRx Ther Proc 9 Supine Active Shoulder Flexion   PTRx Ther Proc 9 - Details 0-90 x 15 after 5 reps of    PTRx Ther Proc 10 Shoulder External Rotation Sidelying   PTRx Ther Proc 10 - Details 20 cuing not to hike UT and over-rotate his trunk.   PTRx Ther Proc 11 Supine Ceiling Punch   PTRx Ther Proc 11 - Details x 20   Patient Response/Progress did well, no increase in pain   Neuromuscular Re-education   PTRx Neuro Re-ed 1 Shoulder Sidelying Abduction   PTRx Neuro Re-ed 1 - Details 15 with NMR not to hike UT   Patient Response/Progress did well, no increase in pain   Plan   Home program see ptrx   Plan for next session add standing cuff strength as allowed   Total Session Time   Timed Code Treatment Minutes 25   Total Treatment Time (sum of timed and untimed services) 25         PLAN  Continue therapy per current plan of care.    Beginning/End Dates of Progress Note Reporting Period:    to 04/03/2024    Referring Provider:  Dr. Kyle Branham

## 2024-04-09 ENCOUNTER — TRANSFERRED RECORDS (OUTPATIENT)
Dept: HEALTH INFORMATION MANAGEMENT | Facility: CLINIC | Age: 54
End: 2024-04-09
Payer: COMMERCIAL

## 2024-04-12 NOTE — NURSING NOTE
"Chief Complaint   Patient presents with     Shoulder Pain       Initial /78 (BP Location: Right arm, Patient Position: Right side, Cuff Size: Adult Regular)  Pulse 52  Temp 97.7  F (36.5  C) (Oral)  Resp 14  Ht 1.925 m (6' 3.78\")  Wt 103.9 kg (229 lb)  SpO2 98%  BMI 28.04 kg/m2 Estimated body mass index is 28.04 kg/(m^2) as calculated from the following:    Height as of this encounter: 1.925 m (6' 3.78\").    Weight as of this encounter: 103.9 kg (229 lb).  Medication Reconciliation: complete     Will Niharika QUICK      " 89

## 2024-04-17 ENCOUNTER — THERAPY VISIT (OUTPATIENT)
Dept: PHYSICAL THERAPY | Facility: CLINIC | Age: 54
End: 2024-04-17
Payer: OTHER MISCELLANEOUS

## 2024-04-17 DIAGNOSIS — Z47.1 AFTERCARE FOLLOWING RIGHT SHOULDER JOINT REPLACEMENT SURGERY: ICD-10-CM

## 2024-04-17 DIAGNOSIS — Z96.611 AFTERCARE FOLLOWING RIGHT SHOULDER JOINT REPLACEMENT SURGERY: ICD-10-CM

## 2024-04-17 DIAGNOSIS — M25.511 ACUTE PAIN OF RIGHT SHOULDER: Primary | ICD-10-CM

## 2024-04-17 DIAGNOSIS — Z96.611 PRESENCE OF RIGHT ARTIFICIAL SHOULDER JOINT: ICD-10-CM

## 2024-04-17 PROCEDURE — 97112 NEUROMUSCULAR REEDUCATION: CPT | Mod: GP | Performed by: PHYSICAL THERAPIST

## 2024-04-17 PROCEDURE — 97110 THERAPEUTIC EXERCISES: CPT | Mod: GP | Performed by: PHYSICAL THERAPIST

## 2024-04-23 ENCOUNTER — THERAPY VISIT (OUTPATIENT)
Dept: PHYSICAL THERAPY | Facility: CLINIC | Age: 54
End: 2024-04-23
Payer: OTHER MISCELLANEOUS

## 2024-04-23 DIAGNOSIS — Z96.611 PRESENCE OF RIGHT ARTIFICIAL SHOULDER JOINT: ICD-10-CM

## 2024-04-23 DIAGNOSIS — Z96.611 AFTERCARE FOLLOWING RIGHT SHOULDER JOINT REPLACEMENT SURGERY: ICD-10-CM

## 2024-04-23 DIAGNOSIS — M25.511 ACUTE PAIN OF RIGHT SHOULDER: Primary | ICD-10-CM

## 2024-04-23 DIAGNOSIS — Z47.1 AFTERCARE FOLLOWING RIGHT SHOULDER JOINT REPLACEMENT SURGERY: ICD-10-CM

## 2024-04-23 PROCEDURE — 97110 THERAPEUTIC EXERCISES: CPT | Mod: GP | Performed by: PHYSICAL THERAPIST

## 2024-04-23 PROCEDURE — 97112 NEUROMUSCULAR REEDUCATION: CPT | Mod: GP | Performed by: PHYSICAL THERAPIST

## 2024-05-14 ENCOUNTER — THERAPY VISIT (OUTPATIENT)
Dept: PHYSICAL THERAPY | Facility: CLINIC | Age: 54
End: 2024-05-14
Payer: OTHER MISCELLANEOUS

## 2024-05-14 DIAGNOSIS — Z96.611 PRESENCE OF RIGHT ARTIFICIAL SHOULDER JOINT: ICD-10-CM

## 2024-05-14 DIAGNOSIS — M25.511 ACUTE PAIN OF RIGHT SHOULDER: Primary | ICD-10-CM

## 2024-05-14 DIAGNOSIS — Z96.611 AFTERCARE FOLLOWING RIGHT SHOULDER JOINT REPLACEMENT SURGERY: ICD-10-CM

## 2024-05-14 DIAGNOSIS — Z47.1 AFTERCARE FOLLOWING RIGHT SHOULDER JOINT REPLACEMENT SURGERY: ICD-10-CM

## 2024-05-14 PROCEDURE — 97110 THERAPEUTIC EXERCISES: CPT | Mod: GP | Performed by: PHYSICAL THERAPIST

## 2024-05-14 PROCEDURE — 97112 NEUROMUSCULAR REEDUCATION: CPT | Mod: GP | Performed by: PHYSICAL THERAPIST

## 2024-05-28 ENCOUNTER — THERAPY VISIT (OUTPATIENT)
Dept: PHYSICAL THERAPY | Facility: CLINIC | Age: 54
End: 2024-05-28
Payer: OTHER MISCELLANEOUS

## 2024-05-28 DIAGNOSIS — Z47.1 AFTERCARE FOLLOWING RIGHT SHOULDER JOINT REPLACEMENT SURGERY: ICD-10-CM

## 2024-05-28 DIAGNOSIS — Z96.611 AFTERCARE FOLLOWING RIGHT SHOULDER JOINT REPLACEMENT SURGERY: ICD-10-CM

## 2024-05-28 DIAGNOSIS — Z96.611 PRESENCE OF RIGHT ARTIFICIAL SHOULDER JOINT: ICD-10-CM

## 2024-05-28 DIAGNOSIS — M25.511 ACUTE PAIN OF RIGHT SHOULDER: Primary | ICD-10-CM

## 2024-05-28 PROCEDURE — 97110 THERAPEUTIC EXERCISES: CPT | Mod: GP | Performed by: PHYSICAL THERAPIST

## 2024-05-28 PROCEDURE — 97140 MANUAL THERAPY 1/> REGIONS: CPT | Mod: GP | Performed by: PHYSICAL THERAPIST

## 2024-06-04 ENCOUNTER — THERAPY VISIT (OUTPATIENT)
Dept: PHYSICAL THERAPY | Facility: CLINIC | Age: 54
End: 2024-06-04
Payer: OTHER MISCELLANEOUS

## 2024-06-04 DIAGNOSIS — Z47.1 AFTERCARE FOLLOWING RIGHT SHOULDER JOINT REPLACEMENT SURGERY: ICD-10-CM

## 2024-06-04 DIAGNOSIS — Z96.611 AFTERCARE FOLLOWING RIGHT SHOULDER JOINT REPLACEMENT SURGERY: ICD-10-CM

## 2024-06-04 DIAGNOSIS — M25.511 ACUTE PAIN OF RIGHT SHOULDER: Primary | ICD-10-CM

## 2024-06-04 DIAGNOSIS — Z96.611 PRESENCE OF RIGHT ARTIFICIAL SHOULDER JOINT: ICD-10-CM

## 2024-06-04 PROCEDURE — 97140 MANUAL THERAPY 1/> REGIONS: CPT | Mod: GP | Performed by: PHYSICAL THERAPIST

## 2024-06-04 PROCEDURE — 97110 THERAPEUTIC EXERCISES: CPT | Mod: GP | Performed by: PHYSICAL THERAPIST

## 2024-06-11 ENCOUNTER — TRANSFERRED RECORDS (OUTPATIENT)
Dept: HEALTH INFORMATION MANAGEMENT | Facility: CLINIC | Age: 54
End: 2024-06-11
Payer: COMMERCIAL

## 2024-06-12 ENCOUNTER — TRANSCRIBE ORDERS (OUTPATIENT)
Dept: OTHER | Age: 54
End: 2024-06-12

## 2024-06-12 DIAGNOSIS — Z96.611 STATUS POST RIGHT SHOULDER HEMIARTHROPLASTY: Primary | ICD-10-CM

## 2024-06-18 ENCOUNTER — THERAPY VISIT (OUTPATIENT)
Dept: PHYSICAL THERAPY | Facility: CLINIC | Age: 54
End: 2024-06-18
Payer: OTHER MISCELLANEOUS

## 2024-06-18 DIAGNOSIS — Z47.1 AFTERCARE FOLLOWING RIGHT SHOULDER JOINT REPLACEMENT SURGERY: ICD-10-CM

## 2024-06-18 DIAGNOSIS — Z96.611 AFTERCARE FOLLOWING RIGHT SHOULDER JOINT REPLACEMENT SURGERY: ICD-10-CM

## 2024-06-18 DIAGNOSIS — Z96.611 PRESENCE OF RIGHT ARTIFICIAL SHOULDER JOINT: ICD-10-CM

## 2024-06-18 DIAGNOSIS — M25.511 ACUTE PAIN OF RIGHT SHOULDER: Primary | ICD-10-CM

## 2024-06-18 DIAGNOSIS — Z96.611 STATUS POST RIGHT SHOULDER HEMIARTHROPLASTY: ICD-10-CM

## 2024-06-18 PROBLEM — M67.952 TENDINOPATHY OF LEFT GLUTEUS MEDIUS: Status: RESOLVED | Noted: 2023-08-31 | Resolved: 2024-06-18

## 2024-06-18 PROCEDURE — 97110 THERAPEUTIC EXERCISES: CPT | Mod: GP | Performed by: PHYSICAL THERAPIST

## 2024-06-18 PROCEDURE — 97112 NEUROMUSCULAR REEDUCATION: CPT | Mod: GP | Performed by: PHYSICAL THERAPIST

## 2024-06-18 NOTE — PROGRESS NOTES
"   06/04/24 0500   Appointment Info   Signing clinician's name / credentials Oneida Black DPT, SCS   Total/Authorized Visits 12 ([facility]) and    Visits Used 10   Medical Diagnosis s/p R shoulder HH pyrocarbon arthroplasty   PT Tx Diagnosis R shoulder pain   Precautions/Limitations sees MD again in June   Progress Note/Certification   Onset of illness/injury or Date of Surgery 01/17/24   Therapy Frequency 1x/wk for 8 weeks then every other week for 4 weeks   Predicted Duration 12 weeks   PT Goal 1   Goal Identifier reaching   Goal Description new goal to reach to top shelf of OH cabinet with 5# and lifr 15-20# from floor to waist without pain or stiffness   Rationale to maximize safety and independence within the home;to maximize safety and independence with performance of ADLs and functional tasks   Goal Progress can lift 2# to top shelf but does feel \"tight\"   Target Date 07/15/24   Subjective Report   Subjective Report HE feels like his pain has worsened in the shoulder (maybe from sleeping on his R side) but he feels \"uncomfortable\" when he tries to reach overhead for the last few days but he feels more uncomfortable the last few days. He also notes that his L shoulder has been more painful the past couple of days. He was doing more driving the lat 2 days. He reports he hasn't been as consistent with his neck exercise lately   Objective Measures   Objective Measures Objective Measure 2;Objective Measure 3   Objective Measure 1   Objective Measure R shoulder AAROM:   Details wand standing ugreimq=851, ABD=139 in scaption, supine wand ER=57, ext-IR=T11   Objective Measure 2   Objective Measure R shoulder AROM:   Details 140/140 after cervical retraction/ext   Objective Measure 3   Objective Measure R shoulder strength   Details 4+/4/4+/4   Treatment Interventions (PT)   Interventions Therapeutic Procedure/Exercise;Therapeutic Activity;Neuromuscular Re-education;Manual Therapy   Therapeutic " "Procedure/Exercise   Therapeutic Procedures: strength, endurance, ROM, flexibility minutes (25014) 23   PTRx Ther Proc 1 Seated Cervical Retraction Extension With Overpressure   PTRx Ther Proc 1 - Details 2 x 15 and noted \"less tension and tightness\" at end range pain with flexion and abduction   PTRx Ther Proc 2 Pendulum/Codmans   PTRx Ther Proc 2 - Details < 1'   PTRx Ther Proc 3 Wand Shoulder Abduction Standing   PTRx Ther Proc 3 - Details x15   PTRx Ther Proc 4 Four Corner Stretch Flexion   PTRx Ther Proc 4 - Details 5   PTRx Ther Proc 5 Four Corner Stretch External Rotation With Abduction   PTRx Ther Proc 5 - Details 10 cuing not to drop head   PTRx Ther Proc 6 Wand Shoulder Flexion in Standing   PTRx Ther Proc 6 - Details x15   PTRx Ther Proc 7 Wand Shoulder External Rotation in Neutral   PTRx Ther Proc 7 - Details rev   PTRx Ther Proc 8 Side-lying Posterior Capsule Stretch   PTRx Ther Proc 8 - Details rev   PTRx Ther Proc 9 Four Corner Stretch Internal Rotation   PTRx Ther Proc 9 - Details rev   PTRx Ther Proc 10 Shoulder External Rotation Sidelying   PTRx Ther Proc 10 - Details REV   PTRx Ther Proc 12 - Details pt demonstrated understanding of increasing dosaging of neck ex and also lax ball work on scap to mitigate soft tissue tightness   Skilled Intervention cuing for no UT hiking and focusing on end range for the neck exercises   Patient Response/Progress did well, no increase in pain   Neuromuscular Re-education   Neuro Re-ed 1 tried h. abduction=too much hiking   PTRx Neuro Re-ed 1 Shoulder Flexion   PTRx Neuro Re-ed 1 - Details rev   PTRx Neuro Re-ed 2 Shoulder Scaption Full Can   PTRx Neuro Re-ed 2 - Details rev   PTRx Neuro Re-ed 3 Shoulder Extension in Standing   PTRx Neuro Re-ed 3 - Details rev   PTRx Neuro Re-ed 4 Push-Up Plus At Counter   PTRx Neuro Re-ed 4 - Details rev   PTRx Neuro Re-ed 5 Shoulder Theraband Low Row/Pulldown   PTRx Neuro Re-ed 5 - Details rev   Skilled Intervention see above "   Patient Response/Progress did well, no increase in pain   Manual Therapy   Manual Therapy: Mobilization, MFR, MLD, friction massage minutes (78185) 15   Manual Therapy 1 TFM to R UT/LS with notable severe hypertonicity   Skilled Intervention proper technique   Patient Response/Progress yayo well, felt good   Plan   Home program see ptrx   Updates to plan of care revisted the neck exercise and talked at length about dosaging and increasing again to 6x/day to see if achiness and pain in both shoulders will continue to resolve   Plan for next session recheck the neck ex and add bicep curls and bent over row   Total Session Time   Timed Code Treatment Minutes 38   Total Treatment Time (sum of timed and untimed services) 38       PLAN  Continue therapy per current plan of care, once a week every other week to work on scapular stability and RC strength/mobility and posture.     Beginning/End Dates of Progress Note Reporting Period:    to 06/04/2024    Referring Provider:  Param Sargent

## 2024-07-16 ENCOUNTER — THERAPY VISIT (OUTPATIENT)
Dept: PHYSICAL THERAPY | Facility: CLINIC | Age: 54
End: 2024-07-16
Payer: OTHER MISCELLANEOUS

## 2024-07-16 DIAGNOSIS — Z96.611 AFTERCARE FOLLOWING RIGHT SHOULDER JOINT REPLACEMENT SURGERY: ICD-10-CM

## 2024-07-16 DIAGNOSIS — Z96.611 PRESENCE OF RIGHT ARTIFICIAL SHOULDER JOINT: ICD-10-CM

## 2024-07-16 DIAGNOSIS — M25.511 ACUTE PAIN OF RIGHT SHOULDER: Primary | ICD-10-CM

## 2024-07-16 DIAGNOSIS — Z47.1 AFTERCARE FOLLOWING RIGHT SHOULDER JOINT REPLACEMENT SURGERY: ICD-10-CM

## 2024-07-16 PROCEDURE — 97110 THERAPEUTIC EXERCISES: CPT | Mod: GP | Performed by: PHYSICAL THERAPIST

## 2024-07-16 PROCEDURE — 97112 NEUROMUSCULAR REEDUCATION: CPT | Mod: GP | Performed by: PHYSICAL THERAPIST

## 2024-07-16 PROCEDURE — 97530 THERAPEUTIC ACTIVITIES: CPT | Mod: GP | Performed by: PHYSICAL THERAPIST

## 2024-07-16 NOTE — PROGRESS NOTES
07/16/24 0500   Appointment Info   Signing clinician's name / credentials Oneida Black DPT, SCS   Total/Authorized Visits 12 ([facility]) and    Visits Used 12   Medical Diagnosis s/p R shoulder HH pyrocarbon arthroplasty   PT Tx Diagnosis R shoulder pain   Precautions/Limitations sees sancho on august 12th   Progress Note/Certification   Onset of illness/injury or Date of Surgery 01/17/24   Therapy Frequency 1x/wk every other week   Predicted Duration 8 weeks   Progress Note Due Date 08/04/24   Progress Note Completed Date 07/16/24   PT Goal 1   Goal Identifier reaching   Goal Description new goal to reach to top shelf of [place] cabinet with 5# and lifr 15-20# from floor to waist without pain or stiffness   Rationale to maximize safety and independence within the home;to maximize safety and independence with performance of ADLs and functional tasks   Goal Progress can lift 15-20# from floor to waist but hasn't been able to do 5# to the top shelf , so extende goal   Target Date 08/12/24   Subjective Report   Subjective Report Pt reports it is still difficult to touch the opposite shoulder and he feels stiffness and a little pain (if he pushes on it to get it going). He also feels like he might be doing too much with his arm and he gets a little sore on the top of the shoulder. He has been traveling for some family emergencies and has done a lot of the neck exercises and is doing his stretches daily and his strength 2-3x/week   Objective Measures   Objective Measures Objective Measure 2;Objective Measure 3   Objective Measure 1   Objective Measure R shoulder AAROM:   Details ABD= 146   Objective Measure 2   Objective Measure R shoulder AROM:   Details 150/143/T10/60   Objective Measure 3   Objective Measure cervical mobility   Details no longer lacking extension and after a set of 10 reps of retraction with extension, pt reports   Treatment Interventions (PT)   Interventions Therapeutic  "Procedure/Exercise;Therapeutic Activity;Neuromuscular Re-education;Manual Therapy   Therapeutic Procedure/Exercise   Therapeutic Procedures: strength, endurance, ROM, flexibility minutes (13852) 23   PTRx Ther Proc 1 Seated Cervical Retraction Extension With Overpressure   PTRx Ther Proc 1 - Details 2 x 15    PTRx Ther Proc 2 Soft Tissue work for Upper Quadrant - Theracane or Lacrosse Ball   PTRx Ther Proc 2 - Details educated for home   PTRx Ther Proc 3 Pendulum/Codmans   PTRx Ther Proc 3 - Details < 1'   PTRx Ther Proc 4 Wand Shoulder Abduction Standing   PTRx Ther Proc 4 - Details x15   PTRx Ther Proc 5 Four Corner Stretch Flexion   PTRx Ther Proc 5 - Details 5   PTRx Ther Proc 6 Four Corner Stretch External Rotation With Abduction   PTRx Ther Proc 6 - Details 10 cuing not to drop head   PTRx Ther Proc 7 Wand Shoulder Flexion in Standing   PTRx Ther Proc 7 - Details x15   PTRx Ther Proc 8 Wand Shoulder External Rotation in Neutral   PTRx Ther Proc 8 - Details x15   PTRx Ther Proc 9 Side-lying Posterior Capsule Stretch   PTRx Ther Proc 9 - Details 30\" x 5   PTRx Ther Proc 10 Four Corner Stretch Internal Rotation   PTRx Ther Proc 10 - Details 10\" x 10   PTRx Ther Proc 11 Shoulder External Rotation Sidelying   PTRx Ther Proc 11 - Details 1# x 20   PTRx Ther Proc 12 Biceps Hammer Curl - Alternating   PTRx Ther Proc 12 - Details 5# x 15   PTRx Ther Proc 13 Bent Over Rows with Dumbbell   PTRx Ther Proc 13 - Details 10# x 15   Skilled Intervention cuing not to hike [place] and pull scap back and down first   Patient Response/Progress did well, no increase in pain   Therapeutic Activity   Therapeutic Activities: dynamic activities to improve functional performance minutes (86081) 10   Ther Act 1 return to basketball shooting and passing   Ther Act 1 - Details 10'   Patient Response/Progress yayo well, no pain but did note decreased accuracy and lacking some of hte strength to keep the ball going straight   Neuromuscular " Re-education   Neuromuscular re-ed of mvmt, balance, coord, kinesthetic sense, posture, proprioception minutes (65947) 15   PTRx Neuro Re-ed 1 Shoulder Horizontal Abduction Standing   PTRx Neuro Re-ed 1 - Details x15 to 60 deg with cuing to avoid UT hiking   PTRx Neuro Re-ed 2 Shoulder Flexion   PTRx Neuro Re-ed 2 - Details 1# x 15 with back on wall to focus on scap NMR   PTRx Neuro Re-ed 3 Shoulder Extension in Standing   PTRx Neuro Re-ed 3 - Details 1# x 15 with NMR not to hike UT   PTRx Neuro Re-ed 4 Shoulder Scaption Full Can   PTRx Neuro Re-ed 4 - Details 1# x 15 with back on wall to focus on scap NMR   PTRx Neuro Re-ed 5 Push-Up Plus At Counter   PTRx Neuro Re-ed 5 - Details x 15 cues needed to avoid trunk flexion and not arch back   PTRx Neuro Re-ed 6 Shoulder Theraband Low Row/Pulldown   PTRx Neuro Re-ed 6 - Details BTB x 15   Skilled Intervention see above   Patient Response/Progress did well, no increase in pain--but challenged with more time   PTRx Neuro Re-ed 7 Scapular Stabilization/Proprioception With A Ball   PTRx Neuro Re-ed 7 - Details 2'    PTRx Neuro Re-ed 8 Shoulder Wall Dribble   PTRx Neuro Re-ed 8 - Details 2'   Plan   Home program see ptrx   Updates to plan of care added proprio and basketball   Plan for next session add WB ex and weight shift with birddog   Total Session Time   Timed Code Treatment Minutes 48   Total Treatment Time (sum of timed and untimed services) 48       PLAN  Continue therapy per current plan of care.    Beginning/End Dates of Progress Note Reporting Period:  (P) 07/16/24 to 07/16/2024    Referring Provider:  Param Sargent

## 2024-07-30 ENCOUNTER — THERAPY VISIT (OUTPATIENT)
Dept: PHYSICAL THERAPY | Facility: CLINIC | Age: 54
End: 2024-07-30
Payer: OTHER MISCELLANEOUS

## 2024-07-30 DIAGNOSIS — Z47.1 AFTERCARE FOLLOWING RIGHT SHOULDER JOINT REPLACEMENT SURGERY: ICD-10-CM

## 2024-07-30 DIAGNOSIS — Z96.611 AFTERCARE FOLLOWING RIGHT SHOULDER JOINT REPLACEMENT SURGERY: ICD-10-CM

## 2024-07-30 DIAGNOSIS — Z96.611 PRESENCE OF RIGHT ARTIFICIAL SHOULDER JOINT: ICD-10-CM

## 2024-07-30 DIAGNOSIS — M25.511 ACUTE PAIN OF RIGHT SHOULDER: Primary | ICD-10-CM

## 2024-07-30 PROCEDURE — 97110 THERAPEUTIC EXERCISES: CPT | Mod: GP | Performed by: PHYSICAL THERAPIST

## 2024-08-01 ENCOUNTER — OFFICE VISIT (OUTPATIENT)
Dept: FAMILY MEDICINE | Facility: CLINIC | Age: 54
End: 2024-08-01
Payer: COMMERCIAL

## 2024-08-01 VITALS
OXYGEN SATURATION: 97 % | SYSTOLIC BLOOD PRESSURE: 122 MMHG | DIASTOLIC BLOOD PRESSURE: 80 MMHG | HEIGHT: 75 IN | WEIGHT: 218.1 LBS | BODY MASS INDEX: 27.12 KG/M2 | RESPIRATION RATE: 16 BRPM | TEMPERATURE: 97.8 F | HEART RATE: 61 BPM

## 2024-08-01 DIAGNOSIS — L29.9 ITCHING: Primary | ICD-10-CM

## 2024-08-01 DIAGNOSIS — G57.12 LATERAL CUTANEOUS FEMORAL NERVE OF THIGH COMPRESSION OR SYNDROME, LEFT: ICD-10-CM

## 2024-08-01 PROCEDURE — 99213 OFFICE O/P EST LOW 20 MIN: CPT | Performed by: INTERNAL MEDICINE

## 2024-08-01 RX ORDER — TRIAMCINOLONE ACETONIDE 1 MG/G
CREAM TOPICAL 2 TIMES DAILY
Qty: 45 G | Refills: 0 | Status: SHIPPED | OUTPATIENT
Start: 2024-08-01

## 2024-08-01 RX ORDER — CETIRIZINE HYDROCHLORIDE 10 MG/1
10 TABLET ORAL DAILY
Qty: 30 TABLET | Refills: 1 | Status: SHIPPED | OUTPATIENT
Start: 2024-08-01

## 2024-08-01 ASSESSMENT — PAIN SCALES - GENERAL: PAINLEVEL: NO PAIN (0)

## 2024-08-01 NOTE — PROGRESS NOTES
"  Assessment & Plan     1.  Itching (pruritus) involving the thigh and forearm area.  No active lesions seen skin today.  Prescribed triamcinolone 0.1% cream to be applied where he sees skin eruptions also described cetirizine 10 mg daily.  2.  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  Patient informed that this is related to some compression injury to the cutaneous nerve.  Hopefully he will gradually get better.      Nicotine/Tobacco Cessation  He reports that he has been smoking cigarettes and hookah. He has never used smokeless tobacco.  Nicotine/Tobacco Cessation Plan        BMI  Estimated body mass index is 27.26 kg/m  as calculated from the following:    Height as of this encounter: 1.905 m (6' 3\").    Weight as of this encounter: 98.9 kg (218 lb 1.6 oz).         Carloz Fischer is a 53 year old, presenting for the following health issues:  Consult (Bumps on skin    Went to [facility])      8/1/2024    10:50 AM   Additional Questions   Roomed by Ny COBB   Accompanied by self     History of Present Illness       Reason for visit:  Skin irritation    He eats 2-3 servings of fruits and vegetables daily.He consumes 1 sweetened beverage(s) daily.He exercises with enough effort to increase his heart rate 20 to 29 minutes per day.  He exercises with enough effort to increase his heart rate 3 or less days per week.   He is taking medications regularly.     53-year-old gentleman comes in complaining of skin irritation and itching.  He travels a lot and about a month ago he was in [place].  He woke up with a stinging pain in the right thigh suggesting a bug bite.  Since then he has noticed itching of the skin in both thighs and forearms.  He notices small abruptions that he scratches.  Also for about the same time he has noticed numbness in the lateral aspect of left thigh starting from the groin and down.  The numbness has gotten little better in the past week or so      Review of " "Systems  Constitutional, neuro, ENT, endocrine, pulmonary, cardiac, gastrointestinal, genitourinary, musculoskeletal, integument and psychiatric systems are negative, except as otherwise noted.      Objective    /80 (BP Location: Right arm, Patient Position: Sitting, Cuff Size: Adult Large)   Pulse 61   Temp 97.8  F (36.6  C) (Oral)   Resp 16   Ht 1.905 m (6' 3\")   Wt 98.9 kg (218 lb 1.6 oz)   SpO2 97%   BMI 27.26 kg/m    Body mass index is 27.26 kg/m .  Physical Exam   GENERAL: alert and no distress  MS: no gross musculoskeletal defects noted, no edema  SKIN: The thigh and forearm examination reveals no active skin lesions.  I can see some healed marks where he could have had some skin eruptions.  NEURO: Sensory examination of the lower extremity reveals a patch of decreased/altered touch perception left anterior lateral thigh.            Signed Electronically by: Parish Ramirez MD    "

## 2024-08-12 ENCOUNTER — THERAPY VISIT (OUTPATIENT)
Dept: PHYSICAL THERAPY | Facility: CLINIC | Age: 54
End: 2024-08-12
Payer: OTHER MISCELLANEOUS

## 2024-08-12 DIAGNOSIS — Z47.1 AFTERCARE FOLLOWING RIGHT SHOULDER JOINT REPLACEMENT SURGERY: ICD-10-CM

## 2024-08-12 DIAGNOSIS — M25.511 ACUTE PAIN OF RIGHT SHOULDER: Primary | ICD-10-CM

## 2024-08-12 DIAGNOSIS — Z96.611 AFTERCARE FOLLOWING RIGHT SHOULDER JOINT REPLACEMENT SURGERY: ICD-10-CM

## 2024-08-12 DIAGNOSIS — Z96.611 PRESENCE OF RIGHT ARTIFICIAL SHOULDER JOINT: ICD-10-CM

## 2024-08-12 PROCEDURE — 97110 THERAPEUTIC EXERCISES: CPT | Mod: GP | Performed by: PHYSICAL THERAPIST

## 2024-08-12 PROCEDURE — 97530 THERAPEUTIC ACTIVITIES: CPT | Mod: GP | Performed by: PHYSICAL THERAPIST

## 2024-08-12 NOTE — PROGRESS NOTES
08/12/24 0500   Appointment Info   Signing clinician's name / credentials Oneida Black DPT, SCS   Total/Authorized Visits 12 ([facility]) and    Visits Used 14   Medical Diagnosis s/p R shoulder HH pyrocarbon arthroplasty   PT Tx Diagnosis R shoulder pain   Precautions/Limitations seeerika kingsley on august 12th   Other pertinent information pt was 26 min late   Progress Note/Certification   Onset of illness/injury or Date of Surgery 01/17/24   Therapy Frequency 1x/wk every other week   Predicted Duration 8 weeks   Progress Note Due Date 08/04/24   Progress Note Completed Date 07/16/24   PT Goal 1   Goal Identifier reaching   Goal Description new goal to reach to top shelf of [place] cabinet with 5# and lifr 15-20# from floor to waist without pain or stiffness   Rationale to maximize safety and independence within the home;to maximize safety and independence with performance of ADLs and functional tasks   Goal Progress can lift 15-20# from floor to waist but hasn't been able to do 5# to the top shelf , so extende goal   Target Date 08/12/24   Subjective Report   Subjective Report He reports he feels like he can reach behind his L shoulder a little better and it's more stretching and not painful. He reports he has no strength to push something overhead   Objective Measures   Objective Measures Objective Measure 2;Objective Measure 3   Objective Measure 1   Objective Measure R shoulder AAROM:   Details not tested today   Objective Measure 2   Objective Measure R shoulder AROM:   Details 150/150/T9/62   Treatment Interventions (PT)   Interventions Therapeutic Procedure/Exercise;Therapeutic Activity   Therapeutic Procedure/Exercise   Therapeutic Procedures: strength, endurance, ROM, flexibility minutes (47171) 23   PTRx Ther Proc 1 Seated Cervical Retraction Extension With Overpressure   PTRx Ther Proc 1 - Details rev   PTRx Ther Proc 2 Soft Tissue work for Upper Quadrant - Theracane or Lacrosse Ball   PTRx Ther Proc 2 -  "Details educated for home   PTRx Ther Proc 3 Four Corner Stretch Internal Rotation   PTRx Ther Proc 3 - Details rev   PTRx Ther Proc 4 Standing Posterior Capsule Stretch A/B    PTRx Ther Proc 4 - Details 10\" x 10   PTRx Ther Proc 5 Side-lying Posterior Capsule Stretch   PTRx Ther Proc 5 - Details rev   PTRx Ther Proc 6 Pendulum/Codmans   PTRx Ther Proc 6 - Details rev   PTRx Ther Proc 7 Wand Shoulder Abduction Standing   PTRx Ther Proc 7 - Details rev   PTRx Ther Proc 8 Four Corner Stretch Flexion   PTRx Ther Proc 8 - Details rev   PTRx Ther Proc 9 Four Corner Stretch External Rotation With Abduction   PTRx Ther Proc 9 - Details rev   PTRx Ther Proc 10 Wand Shoulder Flexion in Standing   PTRx Ther Proc 10 - Details rev   PTRx Ther Proc 11 Wand Shoulder External Rotation in Neutral   PTRx Ther Proc 11 - Details rev   PTRx Ther Proc 12 Ball Prone Scapula I   PTRx Ther Proc 12 - Details 15   PTRx Ther Proc 13 Shoulder External Rotation Sidelying   PTRx Ther Proc 13 - Details rev   PTRx Ther Proc 14 Biceps Hammer Curl - Alternating   PTRx Ther Proc 14 - Details rev   PTRx Ther Proc 15 Bent Over Rows with Dumbbell   PTRx Ther Proc 15 - Details rev   PTRx Ther Proc 16 Shoulder Theraband Internal Rotation   PTRx Ther Proc 16 - Details 15 with red tb   Skilled Intervention rev cuing for proper form   Patient Response/Progress yayo well, demonstrated understanding of next steps   Ther Proc 1 Full review of every exercise, talking about long-term need for which exercises to prioritize and when, how much weight to use, ways to make exercises harder and transition to work hardening x 20'   Therapeutic Activity   Therapeutic Activities: dynamic activities to improve functional performance minutes (55737) 15   Ther Act 1 discussed activtiy modifications, long term usage of the shoulder, lifting modifications and eventual full return to strength training x 15''   Patient Response/Progress pt demonstrated understanding of above. "   Neuromuscular Re-education   PTRx Neuro Re-ed 2 Ball Prone Scapula T   PTRx Neuro Re-ed 2 - Details 10   PTRx Neuro Re-ed 3 Shoulder Flexion   PTRx Neuro Re-ed 3 - Details rev   PTRx Neuro Re-ed 4 Shoulder Extension in Standing   PTRx Neuro Re-ed 4 - Details rev   PTRx Neuro Re-ed 5 Shoulder Scaption Full Can   PTRx Neuro Re-ed 5 - Details rev   PTRx Neuro Re-ed 6 Push-Up Plus At Counter   PTRx Neuro Re-ed 6 - Details rev   PTRx Neuro Re-ed 7 Shoulder Theraband Low Row/Pulldown   PTRx Neuro Re-ed 7 - Details rev   PTRx Neuro Re-ed 8 Scapular Stabilization/Proprioception With A Ball   PTRx Neuro Re-ed 8 - Details rev   PTRx Neuro Re-ed 9 Shoulder Wall Dribble   PTRx Neuro Re-ed 9 - Details rev   Skilled Intervention see above   Plan   Home program see ptrx   Updates to plan of care added ball ex, IR and posterior capsule stretch in standing   Plan for next session DC to work hardening, left message with Debra Benjamin and will DC at this time unless Debra feels otherwise.       PLAN  Discontinue PT and progress with work hardening     Beginning/End Dates of Progress Note Reporting Period:  07/16/24 to 08/12/2024    Referring Provider:  Dr. Kyle Branham, Debra CARUSO

## 2024-08-13 ENCOUNTER — TRANSFERRED RECORDS (OUTPATIENT)
Dept: HEALTH INFORMATION MANAGEMENT | Facility: CLINIC | Age: 54
End: 2024-08-13
Payer: COMMERCIAL

## 2024-09-09 ENCOUNTER — THERAPY VISIT (OUTPATIENT)
Dept: PHYSICAL THERAPY | Facility: CLINIC | Age: 54
End: 2024-09-09
Payer: OTHER MISCELLANEOUS

## 2024-09-09 DIAGNOSIS — Z96.611 PRESENCE OF RIGHT ARTIFICIAL SHOULDER JOINT: ICD-10-CM

## 2024-09-09 DIAGNOSIS — Z96.611 AFTERCARE FOLLOWING RIGHT SHOULDER JOINT REPLACEMENT SURGERY: ICD-10-CM

## 2024-09-09 DIAGNOSIS — Z96.611 STATUS POST RIGHT SHOULDER HEMIARTHROPLASTY: ICD-10-CM

## 2024-09-09 DIAGNOSIS — M25.511 ACUTE PAIN OF RIGHT SHOULDER: Primary | ICD-10-CM

## 2024-09-09 DIAGNOSIS — Z47.1 AFTERCARE FOLLOWING RIGHT SHOULDER JOINT REPLACEMENT SURGERY: ICD-10-CM

## 2024-09-09 PROCEDURE — 97110 THERAPEUTIC EXERCISES: CPT | Mod: GP | Performed by: PHYSICAL THERAPIST

## 2024-09-09 NOTE — PROGRESS NOTES
09/09/24 0500   Appointment Info   Signing clinician's name / credentials Oneida Black DPT, SCS   Total/Authorized Visits 12 ([facility]) and [facility]   Visits Used 15   Medical Diagnosis s/p R shoulder HH pyrocarbon arthroplasty   PT Tx Diagnosis R shoulder pain   Precautions/Limitations sees debra on august 12th   Other pertinent information pt was 26 min late   Progress Note/Certification   Onset of illness/injury or Date of Surgery 01/17/24   Therapy Frequency 1x/wk every other week   Predicted Duration 8 weeks   Progress Note Due Date 11/09/24   Progress Note Completed Date 09/09/24   PT Goal 1   Goal Identifier reaching   Goal Description lift 5# overhead and up to 30# to chest pain free on R shoulder   Rationale to maximize safety and independence within the home;to maximize safety and independence with performance of ADLs and functional tasks   Goal Progress can lift 15-20# from floor to waist but hasn't been able to do 5# to the top shelf , so extende goal   Target Date 11/09/24   Subjective Report   Subjective Report He went to see Debra and she wansts him to get more strength with his R arm before they consider surgery for his L shoulder. He will be seeing Dr. Branham again in October to discuss options for the next surgery   Objective Measures   Objective Measures Objective Measure 2;Objective Measure 3   Objective Measure 1   Objective Measure challenged with MR today and lots of shaking happening but not pain   Details not tested today   Objective Measure 2   Objective Measure R shoulder AROM:   Details 150/150/T9/62   Treatment Interventions (PT)   Interventions Therapeutic Procedure/Exercise;Therapeutic Activity   Therapeutic Procedure/Exercise   Therapeutic Procedures: strength, endurance, ROM, flexibility minutes (47164) 38   Ther Proc 1 MR for pullover x 10 in supine   PTRx Ther Proc 1 Seated Cervical Retraction Extension With Overpressure   PTRx Ther Proc 1 - Details rev   PTRx Ther Proc 2  "Soft Tissue work for Upper Quadrant - Theracane or Lacrosse Ball   PTRx Ther Proc 2 - Details educated for home   PTRx Ther Proc 3 Four Corner Stretch Internal Rotation   PTRx Ther Proc 3 - Details rev   PTRx Ther Proc 6 Pendulum/Codmans   PTRx Ther Proc 6 - Details rev   PTRx Ther Proc 7 Wand Shoulder Abduction Standing   PTRx Ther Proc 7 - Details rev   PTRx Ther Proc 8 Four Corner Stretch Flexion   PTRx Ther Proc 8 - Details rev   PTRx Ther Proc 9 Four Corner Stretch External Rotation With Abduction   PTRx Ther Proc 9 - Details rev   PTRx Ther Proc 10 Wand Shoulder Flexion in Standing   PTRx Ther Proc 10 - Details rev   PTRx Ther Proc 11 Wand Shoulder External Rotation in Neutral   PTRx Ther Proc 11 - Details rev   PTRx Ther Proc 12 Ball Prone Scapula I   PTRx Ther Proc 12 - Details 2# x 2 min   PTRx Ther Proc 13 Shoulder External Rotation Sidelying   PTRx Ther Proc 13 - Details 2# x 2 min   PTRx Ther Proc 14 Biceps Hammer Curl - Alternating   PTRx Ther Proc 14 - Details MR with towel x 10   PTRx Ther Proc 15 Bent Over Rows with Dumbbell   PTRx Ther Proc 15 - Details 10# with MR x 10 reps   PTRx Ther Proc 16 tricep ext MR with 10# x 10 reps   PTRx Ther Proc 16 - Details 15 with red tb   Skilled Intervention rev cuing for proper form   Patient Response/Progress yayo well, demonstrated understanding of next steps   Ther Proc 1 - Details 10\"/8\"/6\"/4\" neg accentuated counter push up x 10 reps of 5 with 10 sec, 2 for 8, 2 for 6 and 4   Neuromuscular Re-education   PTRx Neuro Re-ed 2 Ball Prone Scapula T   PTRx Neuro Re-ed 2 - Details 10   PTRx Neuro Re-ed 3 Shoulder Flexion   PTRx Neuro Re-ed 3 - Details rev   PTRx Neuro Re-ed 4 Shoulder Extension in Standing   PTRx Neuro Re-ed 4 - Details rev   PTRx Neuro Re-ed 5 Shoulder Scaption Full Can   PTRx Neuro Re-ed 5 - Details rev   PTRx Neuro Re-ed 6 Push-Up Plus At Counter   PTRx Neuro Re-ed 6 - Details rev   PTRx Neuro Re-ed 7 Shoulder Theraband Low Row/Pulldown   PTRx " Neuro Re-ed 7 - Details rev   PTRx Neuro Re-ed 8 Scapular Stabilization/Proprioception With A Ball   PTRx Neuro Re-ed 8 - Details rev   PTRx Neuro Re-ed 9 Shoulder Wall Dribble   PTRx Neuro Re-ed 9 - Details rev   Skilled Intervention see above   Plan   Home program see ptrx   Updates to plan of care added ball ex, IR and posterior capsule stretch in standing   Plan for next session DC to work hardening, left message with Debra Benjamin and will DC at this time unless Debra feels otherwise.   Total Session Time   Timed Code Treatment Minutes 38   Total Treatment Time (sum of timed and untimed services) 38     PLAN  Continue therapy per current plan of care.    Beginning/End Dates of Progress Note Reporting Period:  8-12-24 to 09/09/2024    Referring Provider:  Dr. Kyle Branham

## 2024-10-26 ENCOUNTER — HEALTH MAINTENANCE LETTER (OUTPATIENT)
Age: 54
End: 2024-10-26

## 2024-10-28 ENCOUNTER — THERAPY VISIT (OUTPATIENT)
Dept: PHYSICAL THERAPY | Facility: CLINIC | Age: 54
End: 2024-10-28
Payer: OTHER MISCELLANEOUS

## 2024-10-28 DIAGNOSIS — Z96.611 AFTERCARE FOLLOWING RIGHT SHOULDER JOINT REPLACEMENT SURGERY: ICD-10-CM

## 2024-10-28 DIAGNOSIS — Z96.611 STATUS POST RIGHT SHOULDER HEMIARTHROPLASTY: ICD-10-CM

## 2024-10-28 DIAGNOSIS — Z47.1 AFTERCARE FOLLOWING RIGHT SHOULDER JOINT REPLACEMENT SURGERY: ICD-10-CM

## 2024-10-28 DIAGNOSIS — M25.511 ACUTE PAIN OF RIGHT SHOULDER: Primary | ICD-10-CM

## 2024-10-28 DIAGNOSIS — Z96.611 PRESENCE OF RIGHT ARTIFICIAL SHOULDER JOINT: ICD-10-CM

## 2024-10-28 PROCEDURE — 97112 NEUROMUSCULAR REEDUCATION: CPT | Mod: GP | Performed by: PHYSICAL THERAPIST

## 2024-10-28 PROCEDURE — 97110 THERAPEUTIC EXERCISES: CPT | Mod: GP | Performed by: PHYSICAL THERAPIST

## 2024-11-05 ENCOUNTER — THERAPY VISIT (OUTPATIENT)
Dept: PHYSICAL THERAPY | Facility: CLINIC | Age: 54
End: 2024-11-05
Payer: OTHER MISCELLANEOUS

## 2024-11-05 DIAGNOSIS — Z96.611 PRESENCE OF RIGHT ARTIFICIAL SHOULDER JOINT: ICD-10-CM

## 2024-11-05 DIAGNOSIS — Z96.611 AFTERCARE FOLLOWING RIGHT SHOULDER JOINT REPLACEMENT SURGERY: Primary | ICD-10-CM

## 2024-11-05 DIAGNOSIS — Z47.1 AFTERCARE FOLLOWING RIGHT SHOULDER JOINT REPLACEMENT SURGERY: Primary | ICD-10-CM

## 2024-11-05 DIAGNOSIS — M25.511 ACUTE PAIN OF RIGHT SHOULDER: ICD-10-CM

## 2024-11-05 PROCEDURE — 97110 THERAPEUTIC EXERCISES: CPT | Mod: GP | Performed by: PHYSICAL THERAPIST

## 2024-11-05 PROCEDURE — 97140 MANUAL THERAPY 1/> REGIONS: CPT | Mod: GP | Performed by: PHYSICAL THERAPIST

## 2024-11-11 ENCOUNTER — THERAPY VISIT (OUTPATIENT)
Dept: PHYSICAL THERAPY | Facility: CLINIC | Age: 54
End: 2024-11-11
Payer: OTHER MISCELLANEOUS

## 2024-11-11 DIAGNOSIS — M25.511 ACUTE PAIN OF RIGHT SHOULDER: ICD-10-CM

## 2024-11-11 DIAGNOSIS — Z96.611 AFTERCARE FOLLOWING RIGHT SHOULDER JOINT REPLACEMENT SURGERY: Primary | ICD-10-CM

## 2024-11-11 DIAGNOSIS — Z96.611 PRESENCE OF RIGHT ARTIFICIAL SHOULDER JOINT: ICD-10-CM

## 2024-11-11 DIAGNOSIS — Z47.1 AFTERCARE FOLLOWING RIGHT SHOULDER JOINT REPLACEMENT SURGERY: Primary | ICD-10-CM

## 2024-11-11 PROCEDURE — 97112 NEUROMUSCULAR REEDUCATION: CPT | Mod: GP | Performed by: PHYSICAL THERAPIST

## 2024-11-11 PROCEDURE — 97530 THERAPEUTIC ACTIVITIES: CPT | Mod: GP | Performed by: PHYSICAL THERAPIST

## 2024-11-11 PROCEDURE — 97110 THERAPEUTIC EXERCISES: CPT | Mod: GP | Performed by: PHYSICAL THERAPIST

## 2024-11-11 NOTE — PROGRESS NOTES
11/11/24 0500   Appointment Info   Signing clinician's name / credentials Oneida Black DPT, SCS   Total/Authorized Visits 26 ([facility]) and    Visits Used 18   Medical Diagnosis s/p R shoulder HH pyrocarbon arthroplasty   PT Tx Diagnosis R shoulder pain   Precautions/Limitations sees dafne on 11-19   Progress Note/Certification   Onset of illness/injury or Date of Surgery 01/17/24   Therapy Frequency 1x/wk every other week   Predicted Duration 8 weeks   Progress Note Due Date 11/09/24   Progress Note Completed Date 11/11/24   PT Goal 1   Goal Identifier reaching   Goal Description improve AROM and strength to reach overhead with 10# pain free without shoulder hiking to 140 deg   Rationale to maximize safety and independence within the home;to maximize safety and independence with performance of ADLs and functional tasks   Goal Progress can lift 50# from floor, up to 30# overhead and no longer hiking with trap   Target Date 12/05/24   Date Met 11/11/24   Subjective Report   Subjective Report He feels like the reaching over his shoulder is a lot better--he can still reach wehre he needs to but is still a little tight at the end ranges. He feels about 90% better overall.   Objective Measures   Objective Measures Objective Measure 2;Objective Measure 3   Objective Measure 1   Objective Measure R shoulder strength:   Details 5/4+/5-/5   Objective Measure 2   Objective Measure R shoulder AROM:   Details 155/152/T8/60   Treatment Interventions (PT)   Interventions Therapeutic Procedure/Exercise;Therapeutic Activity   Therapeutic Procedure/Exercise   Therapeutic Procedures: strength, endurance, ROM, flexibility minutes (73040) 23   Ther Proc 1 rev HEP x 12'   PTRx Ther Proc 1 Four Corner Stretch Flexion   PTRx Ther Proc 1 - Details 15   PTRx Ther Proc 2 Four Corner Stretch External Rotation With Abduction   PTRx Ther Proc 2 - Details 15   PTRx Ther Proc 3 Side-lying Posterior Capsule Stretch   PTRx Ther Proc  "3 - Details rev for HEP   PTRx Ther Proc 4 Four Corner Stretch Internal Rotation   PTRx Ther Proc 4 - Details 15   PTRx Ther Proc 5 Shoulder Flexion   PTRx Ther Proc 5 - Details rev for HEP   PTRx Ther Proc 6 Shoulder Scaption Full Can   PTRx Ther Proc 6 - Details rev for HEP   PTRx Ther Proc 7 Shoulder External Rotation Sidelying   PTRx Ther Proc 7 - Details rev for HEP   PTRx Ther Proc 8 Cable Shoulder Internal Rotation at Side   PTRx Ther Proc 8 - Details rev for HEP   PTRx Ther Proc 9 Pendulum/Codmans   PTRx Ther Proc 9 - Details < 1'   PTRx Ther Proc 10 neg accentuated push-ups 10 x 8\" lowering on counter   PTRx Ther Proc 10 - Details chest press in incline with MR x 10   PTRx Ther Proc 11 MR pullover x 10   PTRx Ther Proc 12 UBE x 4' FWD and BKWD   PTRx Ther Proc 13 inferior glide with belt 10\" x 10 with reps of abd from  after x 10   PTRx Ther Proc 14 Ball Prone Scapula I   PTRx Ther Proc 14 - Details rev   PTRx Ther Proc 15 Shoulder External Rotation Sidelying   PTRx Ther Proc 15 - Details rev   PTRx Ther Proc 16 Biceps Hammer Curl - Alternating   PTRx Ther Proc 16 - Details rev   PTRx Ther Proc 17 Bent Over Rows with Dumbbell   PTRx Ther Proc 17 - Details rev   PTRx Ther Proc 18 Shoulder Theraband Internal Rotation   PTRx Ther Proc 18 - Details rev   Skilled Intervention rev cuing for proper form   Patient Response/Progress yayo well, demonstrated understanding of next steps   Therapeutic Activity   Therapeutic Activities: dynamic activities to improve functional performance minutes (54853) 15   Ther Act 1 educated pt and gave list for info for strength at gym x 15'   Patient Response/Progress pt demonstrated understanding   Skilled Intervention issued hand written info for pt to take to gym   Neuromuscular Re-education   Neuromuscular re-ed of mvmt, balance, coord, kinesthetic sense, posture, proprioception minutes (95741) 8   Neuro Re-ed 1 rev HEP x 8'   PTRx Neuro Re-ed 1 Seated Cervical Retraction " Extension With Overpressure   PTRx Neuro Re-ed 1 - Details rev for HEP   PTRx Neuro Re-ed 2 Shoulder Horizontal Abduction Standing   PTRx Neuro Re-ed 2 - Details rev for HEP   PTRx Neuro Re-ed 3 Scapular Stabilization/Proprioception With A Ball   PTRx Neuro Re-ed 3 - Details rev for HEP   PTRx Neuro Re-ed 4 Push-Up Plus At Counter   PTRx Neuro Re-ed 4 - Details rev for HEP   PTRx Neuro Re-ed 5 Shoulder Extension in Standing   PTRx Neuro Re-ed 5 - Details rev for HEP   PTRx Neuro Re-ed 6 PNF Diagonal 2   PTRx Neuro Re-ed 6 - Details rev for HEP   PTRx Neuro Re-ed 7 Shoulder Theraband Low Row/Pul[place]own   PTRx Neuro Re-ed 7 - Details hep   PTRx Neuro Re-ed 8 Scapular Stabilization/Proprioception With A Ball   PTRx Neuro Re-ed 8 - Details hep   PTRx Neuro Re-ed 9 Shoulder Wall Dribble   PTRx Neuro Re-ed 9 - Details hep   Skilled Intervention see above   Plan   Home program see ptrx   Updates to plan of care rev HEP and discussed progression for gym activity   Plan for next session will f/u with PT following surgery for his L shoulder unless MD feels differently.   Total Session Time   Timed Code Treatment Minutes 46   Total Treatment Time (sum of timed and untimed services) 46       PLAN  See discharge report    Beginning/End Dates of Progress Note Reporting Period:  09/09/2024 to 11/11/2024    Referring Provider:  Dr. Kyle Branham    DISCHARGE  Reason for Discharge: Patient has met all goals.    Equipment Issued: n/a    Discharge Plan: Patient to continue home program.    Referring Provider:  Dr. Kyle Branham

## 2024-11-19 ENCOUNTER — TRANSFERRED RECORDS (OUTPATIENT)
Dept: HEALTH INFORMATION MANAGEMENT | Facility: CLINIC | Age: 54
End: 2024-11-19
Payer: COMMERCIAL

## 2024-12-30 NOTE — LETTER
HPI   Chief Complaint   Patient presents with    Flu Symptoms       This is a 89-year-old  female presenting to the emergency room with complaints of cold-like symptoms for the past 3 days.  The patient was exposed to her grandson who was ill with similar complaints.  The patient endorses sinus congestion and postnasal drip.  She is experiencing a sore throat, headache, and chills.  She states she has mostly nonproductive cough.  Patient is not experiencing any ear pain or ear drainage.  She rates her discomfort a 10 out of 10 on the pain scale.  She is not experiencing any abdominal pain.  Denies any alteration in her urine or bowel function.  She has not experiencing any vision changes, speech changes, paresthesias, or focal weakness.  Denies any shortness of breath, dizziness, or palpitations.  She has been taking Vicks, Chloraseptic, and DayQuil.  The patient has been vaccinated for influenza.      History provided by:  Patient   used: No            Patient History   Past Medical History:   Diagnosis Date    Acute follicular conjunctivitis, bilateral 07/05/2024    Anesthesia of skin     Numbness and tingling in right hand    Diverticulitis 01/13/2024    Personal history of diseases of the blood and blood-forming organs and certain disorders involving the immune mechanism     History of anemia    Personal history of other medical treatment 05/06/2021    History of screening mammography    Unspecified cataract     Cataracts, bilateral     Past Surgical History:   Procedure Laterality Date    BLADDER SURGERY  09/07/2017    Bladder Surgery    BREAST BIOPSY  09/07/2017    Biopsy Breast Open    CARDIAC SURGERY  09/07/2017    Heart Surgery    FOOT SURGERY  09/07/2017    Foot Surgery    HYSTERECTOMY  09/07/2017    Hysterectomy    OTHER SURGICAL HISTORY  09/05/2022    Uvulopalatopharyngoplasty    OTHER SURGICAL HISTORY  03/06/2020    Surgery    OTHER SURGICAL HISTORY  11/09/2019    Cardiac  [facility]  [address]  [place]  [address]  [phone]          1/10/2019    Bj Philip  [address] [address]  [phone] (home) none (work)    :     1970      To Whom it May Concern:    This patient missed work 1/10/2019 due to right elbow injury.  He has an elbow fracture that needs to rest for 2 weeks.    Please contact me for questions or concerns.    Sincerely,    Randolph Cox         catheterization    OTHER SURGICAL HISTORY  11/09/2019    Tonsillectomy     Family History   Problem Relation Name Age of Onset    Other (cva) Mother      Coronary artery disease Father      Coronary artery disease Brother      Other (malignant neoplasm) Brother      Stomach cancer Brother       Social History     Tobacco Use    Smoking status: Never    Smokeless tobacco: Never   Vaping Use    Vaping status: Never Used   Substance Use Topics    Alcohol use: Not Currently     Comment: back in her 20s    Drug use: Never       Physical Exam   ED Triage Vitals [12/30/24 1305]   Temperature Heart Rate Respirations BP   37.1 °C (98.8 °F) 92 16 --      Pulse Ox Temp Source Heart Rate Source Patient Position   95 % Skin -- --      BP Location FiO2 (%)     -- --       Physical Exam  Vitals and nursing note reviewed.   HENT:      Head: Normocephalic.      Right Ear: Tympanic membrane and external ear normal.      Left Ear: Tympanic membrane and external ear normal.      Nose: Congestion present.      Mouth/Throat:      Mouth: Mucous membranes are moist.      Pharynx: Uvula midline. Posterior oropharyngeal erythema present.      Tonsils: 0 on the right. 0 on the left.   Eyes:      Conjunctiva/sclera: Conjunctivae normal.      Pupils: Pupils are equal, round, and reactive to light.   Cardiovascular:      Rate and Rhythm: Normal rate and regular rhythm.      Pulses: Normal pulses.      Heart sounds: Normal heart sounds.   Pulmonary:      Effort: Pulmonary effort is normal.      Breath sounds: Normal breath sounds.   Abdominal:      General: Bowel sounds are normal.      Palpations: Abdomen is soft.   Musculoskeletal:         General: Normal range of motion.      Cervical back: Full passive range of motion without pain.   Lymphadenopathy:      Cervical: No cervical adenopathy.   Skin:     General: Skin is warm.      Capillary Refill: Capillary refill takes less than 2 seconds.   Neurological:      General: No focal deficit  present.      Mental Status: She is alert.   Psychiatric:         Mood and Affect: Mood normal.           ED Course & MDM   Diagnoses as of 12/30/24 1639   Flu                 No data recorded     Lakesha Coma Scale Score: 15 (12/30/24 1308 : Marilyn Baum RN)                           Medical Decision Making  The patient was seen and evaluated by the nurse practitioner, Ayaka Olivarez.  The patient is presenting to the emergency room with complaints of flulike symptoms.  Differential diagnosis includes influenza, COVID, RSV, pneumonia, bronchitis, URI, or other acute process.  An x-ray of the chest was performed and showed no acute cardiopulmonary process.  Viral swabs were obtained and the patient tested positive for influenza A.  Patient's vital signs are stable.  She was notified of her imaging and laboratory results.  The patient was advised to provide symptomatic care.  The patient is to follow up with their primary care physician in the next 2-3 days.  The patient is to return to the ED worse in any way.  The patient was discharged in stable condition with computer discharge instructions given. Patient was agreeable with discharge planning.        Procedure  Procedures     Ayaka Olivarez, CHASTITY-MIKEL  12/30/24 1640

## 2025-02-18 ENCOUNTER — TRANSFERRED RECORDS (OUTPATIENT)
Dept: HEALTH INFORMATION MANAGEMENT | Facility: CLINIC | Age: 55
End: 2025-02-18
Payer: COMMERCIAL

## 2025-04-14 ENCOUNTER — OFFICE VISIT (OUTPATIENT)
Dept: FAMILY MEDICINE | Facility: CLINIC | Age: 55
End: 2025-04-14
Payer: OTHER MISCELLANEOUS

## 2025-04-14 VITALS
SYSTOLIC BLOOD PRESSURE: 115 MMHG | WEIGHT: 221.9 LBS | DIASTOLIC BLOOD PRESSURE: 76 MMHG | TEMPERATURE: 98.5 F | OXYGEN SATURATION: 97 % | HEIGHT: 76 IN | HEART RATE: 66 BPM | RESPIRATION RATE: 13 BRPM | BODY MASS INDEX: 27.02 KG/M2

## 2025-04-14 DIAGNOSIS — Z01.818 PREOP GENERAL PHYSICAL EXAM: Primary | ICD-10-CM

## 2025-04-14 DIAGNOSIS — M19.012 OSTEOARTHRITIS OF LEFT SHOULDER, UNSPECIFIED OSTEOARTHRITIS TYPE: ICD-10-CM

## 2025-04-14 PROBLEM — M25.512 ACUTE PAIN OF LEFT SHOULDER: Status: RESOLVED | Noted: 2017-11-15 | Resolved: 2025-04-14

## 2025-04-14 PROCEDURE — 99214 OFFICE O/P EST MOD 30 MIN: CPT | Performed by: FAMILY MEDICINE

## 2025-04-14 ASSESSMENT — PAIN SCALES - GENERAL: PAINLEVEL_OUTOF10: NO PAIN (0)

## 2025-04-14 NOTE — PROGRESS NOTES
Preoperative Evaluation  [address] [address]  Phone: [phone]  Primary Provider: Acacia Petersen MD  Pre-op Performing Provider: Acacia Petersen MD  Apr 14, 2025 4/14/2025   Surgical Information   What procedure is being done? Left shoulder replacement   Facility or Hospital where procedure/surgery will be performed: [facility]   Who is doing the procedure / surgery? Dr. Branham   Date of surgery / procedure: 05/07/2025   Time of surgery / procedure: 8 am   Where do you plan to recover after surgery? at home with family     Fax number for surgical facility: [phone]    Assessment & Plan     The proposed surgical procedure is considered INTERMEDIATE risk.    Preop general physical exam    Osteoarthritis of left shoulder, unspecified osteoarthritis type  Planned left shoulder replacement            - No identified additional risk factors other than previously addressed    Preoperative Medication Instructions  Antiplatelet or Anticoagulation Medication Instructions   - We reviewed the medication list and the patient is not on an antiplatelet or anticoagulation medications.    Additional Medication Instructions  Take all scheduled medications on the day of surgery    Recommendation  Approval given to proceed with proposed procedure, without further diagnostic evaluation.    Carloz Fischer is a 54 year old, presenting for the following:  Pre-Op Exam          4/14/2025    11:10 AM   Additional Questions   Roomed by Tshia   Accompanied by Daughter     HPI:   Longstanding progressive osteoarthritis of left shoulder.  Planned total joint replacement.  Had right shoulder replaced 1 to 2 years ago successfully.          4/14/2025   Pre-Op Questionnaire   Have you ever had a heart attack or stroke? No   Have you ever had surgery on your heart or blood vessels, such as a stent placement, a coronary artery bypass, or  surgery on an artery in your head, neck, heart, or legs? No   Do you have chest pain with activity? No   Do you have a history of heart failure? No   Do you currently have a cold, bronchitis or symptoms of other infection? No   Do you have a cough, shortness of breath, or wheezing? No   Do you or anyone in your family have previous history of blood clots? No   Do you or does anyone in your family have a serious bleeding problem such as prolonged bleeding following surgeries or cuts? No   Have you ever had problems with anemia or been told to take iron pills? No   Have you had any abnormal blood loss such as black, tarry or bloody stools? No   Have you ever had a blood transfusion? No   Are you willing to have a blood transfusion if it is medically needed before, during, or after your surgery? Yes   Have you or any of your relatives ever had problems with anesthesia? No   Do you have sleep apnea, excessive snoring or daytime drowsiness? No   Do you have any artifical heart valves or other implanted medical devices like a pacemaker, defibrillator, or continuous glucose monitor? No   Do you have artificial joints? (!) UNKNOWN   Are you allergic to latex? No     Health Care Directive  Patient does not have a Health Care Directive:     Preoperative Review of    reviewed - no record of controlled substances prescribed.      Status of Chronic Conditions:  See problem list for active medical problems.  Problems all longstanding and stable, except as noted/documented.  See ROS for pertinent symptoms related to these conditions.    Patient Active Problem List    Diagnosis Date Noted    Osteoarthritis of right shoulder, unspecified osteoarthritis type 01/15/2024     Priority: Medium    Erectile dysfunction, unspecified erectile dysfunction type 10/18/2017     Priority: Medium    Labral tear of shoulder, left, initial encounter 05/24/2017     Priority: Medium    Osteoarthritis of left shoulder, unspecified osteoarthritis  type 05/24/2017     Priority: Medium    S/P arthroscopy of shoulder 05/09/2017     Priority: Medium    Vitamin D deficiency 02/19/2017     Priority: Medium    Scapular dyskinesis 06/05/2014     Priority: Medium    Seasonal allergies      Priority: Medium    CARDIOVASCULAR SCREENING; LDL GOAL LESS THAN 160 10/31/2010     Priority: Medium      Past Medical History:   Diagnosis Date    Pain     left thumb    Seasonal allergies      Past Surgical History:   Procedure Laterality Date    ARTHROPLASTY CARPOMETACARPAL (THUMB JOINT)      LT revision, Dr. Alok Rivera    ARTHROPLASTY CARPOMETACARPAL (THUMB JOINT), ARTHRODESIS, COMBINED  8/4/2011    Procedure:COMBINED ARTHROPLASTY CARPOMETACARPAL (THUMB JOINT), ARTHRODESIS; Metacarpalphalangeal Joint Fusion; Surgeon:ABNER GONZALEZ; Location:[facility]    ARTHROSCOPY SHOULDER RT/LT  8/10    LT decompression of impingement    COLONOSCOPY  01/25/2021    COLONOSCOPY N/A 1/25/2021    Procedure: Colonoscopy, With Polypectomy And Biopsy;  Surgeon: Abiola Amin DO;  Location: [facility]    COLONOSCOPY WITH CO2 INSUFFLATION N/A 1/25/2021    Procedure: COLONOSCOPY, WITH CO2 INSUFFLATION;  Surgeon: Abiola Amin DO;  Location: [facility]    COMBINED ESOPHAGOSCOPY, GASTROSCOPY, DUODENOSCOPY (EGD) WITH CO2 INSUFFLATION N/A 1/25/2021    Procedure: ESOPHAGOGASTRODUODENOSCOPY, WITH CO2 INSUFFLATION;  Surgeon: Abiola Amin DO;  Location: [facility]    ESOPHAGOSCOPY, GASTROSCOPY, DUODENOSCOPY (EGD), COMBINED N/A 1/25/2021    Procedure: Esophagogastroduodenoscopy, With Biopsy;  Surgeon: Abiola Amin DO;  Location: [facility]    REPAIR GAMEKEEPER'S THUMB  8/4/2011    Procedure:REPAIR LIGAMENT ULNAR COLLATERAL THUMB (GAMEKEEPER'S); Thumb Radial Reconstruction    ; Surgeon:ABNER GONZALEZ; Location:[facility]     Current Outpatient Medications   Medication Sig Dispense Refill    cetirizine (ZYRTEC) 10 MG tablet Take 1 tablet (10 mg) by mouth daily 30 tablet 1    triamcinolone (KENALOG) 0.1 % external cream  "Apply topically 2 times daily 45 g 0       Allergies   Allergen Reactions    Seasonal Allergies         Social History     Tobacco Use    Smoking status: Some Days     Types: Cigarettes, Hookah    Smokeless tobacco: Never   Substance Use Topics    Alcohol use: No     Family History   Problem Relation Age of Onset    Anesthesia Reaction No family hx of     Blood Disease No family hx of     Hypertension No family hx of     Cerebrovascular Disease No family hx of     C.A.D. No family hx of     Diabetes No family hx of     Cancer No family hx of      History   Drug Use No             Review of Systems  CONSTITUTIONAL: NEGATIVE for fever, chills, change in weight  INTEGUMENTARY/SKIN: NEGATIVE for worrisome rashes, moles or lesions  EYES: NEGATIVE for vision changes or irritation  ENT/MOUTH: NEGATIVE for ear, mouth and throat problems  RESP: NEGATIVE for significant cough or SOB  BREAST: NEGATIVE for masses, tenderness or discharge  CV: NEGATIVE for chest pain, palpitations or peripheral edema  GI: NEGATIVE for nausea, abdominal pain, heartburn, or change in bowel habits  : NEGATIVE for frequency, dysuria, or hematuria  MUSCULOSKELETAL: NEGATIVE for significant arthralgias or myalgia  NEURO: NEGATIVE for weakness, dizziness or paresthesias  ENDOCRINE: NEGATIVE for temperature intolerance, skin/hair changes  HEME: NEGATIVE for bleeding problems  PSYCHIATRIC: NEGATIVE for changes in mood or affect    Objective    /76 (BP Location: Right arm, Patient Position: Sitting, Cuff Size: Adult Large)   Pulse 66   Temp 98.5  F (36.9  C) (Oral)   Resp 13   Ht 1.93 m (6' 4\")   Wt 100.7 kg (221 lb 14.4 oz)   SpO2 97%   BMI 27.01 kg/m     Estimated body mass index is 27.01 kg/m  as calculated from the following:    Height as of this encounter: 1.93 m (6' 4\").    Weight as of this encounter: 100.7 kg (221 lb 14.4 oz).  Physical Exam  GENERAL: alert and no distress  EYES: Eyes grossly normal to inspection, PERRL and " "conjunctivae and sclerae normal  HENT: ear canals and TM's normal, nose and mouth without ulcers or lesions  NECK: no adenopathy, no asymmetry, masses, or scars  RESP: lungs clear to auscultation - no rales, rhonchi or wheezes  CV: regular rate and rhythm, normal S1 S2, no S3 or S4, no murmur, click or rub, no peripheral edema  ABDOMEN: soft, nontender, no hepatosplenomegaly, no masses and bowel sounds normal  MS: no gross musculoskeletal defects noted, no edema  SKIN: no suspicious lesions or rashes  NEURO: Normal strength and tone, mentation intact and speech normal  PSYCH: mentation appears normal, affect normal/bright    No results for input(s): \"HGB\", \"PLT\", \"INR\", \"NA\", \"POTASSIUM\", \"CR\", \"A1C\" in the last 8760 hours.     Diagnostics  No lab work indicated  Previous EKG on file    Revised Cardiac Risk Index (RCRI)  The patient has the following serious cardiovascular risks for perioperative complications:   - No serious cardiac risks = 0 points     RCRI Interpretation: 0 points: Class I (very low risk - 0.4% complication rate)         Signed Electronically by: Acacia Petersen MD  A copy of this evaluation report is provided to the requesting physician.         "

## 2025-04-14 NOTE — PATIENT INSTRUCTIONS
How to Take Your Medication Before Surgery  Preoperative Medication Instructions   Antiplatelet or Anticoagulation Medication Instructions   - We reviewed the medication list and the patient is not on an antiplatelet or anticoagulation medications.    Additional Medication Instructions  Take all scheduled medications on the day of surgery       Patient Education   Preparing for Your Surgery  For Adults  Getting started  In most cases, a nurse will call to review your health history and instructions. They will give you an arrival time based on your scheduled surgery time. Please be ready to share:  Your doctor's clinic name and phone number  Your medical, surgical, and anesthesia history  A list of allergies and sensitivities  A list of medicines, including herbal treatments and over-the-counter drugs  Whether the patient has a legal guardian (ask how to send us the papers in advance)  Note: You may not receive a call if you were seen at our PAC (Preoperative Assessment Center).  Please tell us if you're pregnant--or if there's any chance you might be pregnant. Some surgeries may injure a fetus (unborn baby), so they require a pregnancy test. Surgeries that are safe for a fetus don't always need a test, and you can choose whether to have one.   Preparing for surgery  Within 10 to 30 days of surgery: Have a pre-op exam (sometimes called an H&P, or History and Physical). This can be done at a clinic or pre-operative center.  If you're having a , you may not need this exam. Talk to your care team.  At your pre-op exam, talk to your care team about all medicines you take. (This includes CBD oil and any drugs, such as THC, marijuana, and other forms of cannabis.) If you need to stop any medicine before surgery, ask when to start taking it again.  This is for your safety. Many medicines and drugs can make you bleed too much during surgery. Some change how well surgery (anesthesia) drugs work.  Call your insurance  company to let them know you're having surgery. (If you don't have insurance, call [phone].)  Call your clinic if there's any change in your health. This includes a scrape or scratch near the surgery site, or any signs of a cold (sore throat, runny nose, cough, rash, fever).  Eating and drinking guidelines  For your safety: Unless your surgeon tells you otherwise, follow the guidelines below.  Eat and drink as normal until 8 hours before you arrive for surgery. After that, no food or milk. You can spit out gum when you arrive.  Drink clear liquids until 2 hours before you arrive. These are liquids you can see through, like water, Gatorade, and Propel Water. They also include plain black coffee and tea (no cream or milk).  No alcohol for 24 hours before you arrive. The night before surgery, stop any drinks that contain THC.  If your care team tells you to take medicine on the morning of surgery, it's okay to take it with a sip of water. No other medicines or drugs are allowed (including CBD oil)--follow your care team's instructions.  If you have questions the day of surgery, call your hospital or surgery center.   Preventing infection  Shower or bathe the night before and the morning of surgery. Follow the instructions your clinic gave you. (If no instructions, use regular soap.)  Don't shave or clip hair near your surgery site. We'll remove the hair if needed.  Don't smoke or vape the morning of surgery. No chewing tobacco for 6 hours before you arrive. A nicotine patch is okay. You may spit out nicotine gum when you arrive.  For some surgeries, the surgeon will tell you to fully quit smoking and nicotine.  We will make every effort to keep you safe from infection. We will:  Clean our hands often with soap and water (or an alcohol-based hand rub).  Clean the skin at your surgery site with a special soap that kills germs.  Give you a special gown to keep you warm. (Cold raises the risk of infection.)  Wear hair  covers, masks, gowns, and gloves during surgery.  Give antibiotic medicine, if prescribed. Not all surgeries need this medicine.  What to bring on the day of surgery  Photo ID and insurance card  Copy of your health care directive, if you have one  Glasses and hearing aids (bring cases)  You can't wear contacts during surgery  Inhaler and eye drops, if you use them (tell us about these when you arrive)  CPAP machine or breathing device, if you use them  A few personal items, if spending the night  If you have . . .  A pacemaker, ICD (cardiac defibrillator), or other implant: Bring the ID card.  An implanted stimulator: Bring the remote control.  A legal guardian: Bring a copy of the certified (court-stamped) guardianship papers.  Please remove any jewelry, including body piercings. Leave jewelry and other valuables at home.  If you're going home the day of surgery  You must have a responsible adult drive you home. They should stay with you overnight as well.  If you don't have someone to stay with you, and you aren't safe to go home alone, we may keep you overnight. Insurance often won't pay for this.  After surgery  If it's hard to control your pain or you need more pain medicine, please call your surgeon's office.  Questions?   If you have any questions for your care team, list them here:   ____________________________________________________________________________________________________________________________________________________________________________________________________________________________________________________________  For informational purposes only. Not to replace the advice of your health care provider. Copyright   2003, 2019 [facility]. All rights reserved. Clinically reviewed by Chau Urena MD. [email or web address] [id number] - REV 08/24.     Some muscle cramps can have a genetic component. Specifically, Hereditary Angiopathy with Nephropathy, Aneurysms, and Muscle Cramps (HANAC)  syndrome, caused by mutations in the COL4A1 gene, can lead to muscle cramps as a systemic symptom. Additionally, a condition called familial X-linked myalgia and cramps is linked to a deletion in the dystrophin gene.   Elaboration:  HANAC Syndrome:  This syndrome is part of a group of conditions related to the COL4A1 gene, which encodes the alpha 1 chain of type IV collagen, a crucial component of basement membranes. Mutations in this gene can lead to fragile blood vessels, kidney problems, and muscle cramps.   Familial X-linked Myalgia and Cramps:  This condition is characterized by muscle cramps and pain and is associated with a deletion in the dystrophin gene, which is located on the X chromosome.     Fast-acting, Short-duration:  - TUMS, Rolaids, Gas-X  - Maalox, Mylanta    Quick-acting, Medium-duration:  - Zantac, Pepcid    Slow-acting, Long-duration:  - Prilosec (omeprazole)  - Prevacid

## 2025-04-18 ENCOUNTER — LAB REQUISITION (OUTPATIENT)
Dept: LAB | Facility: CLINIC | Age: 55
End: 2025-04-18
Payer: COMMERCIAL

## 2025-04-18 DIAGNOSIS — Z00.00 ENCOUNTER FOR GENERAL ADULT MEDICAL EXAMINATION WITHOUT ABNORMAL FINDINGS: ICD-10-CM

## 2025-05-19 ENCOUNTER — TELEPHONE (OUTPATIENT)
Dept: FAMILY MEDICINE | Facility: CLINIC | Age: 55
End: 2025-05-19
Payer: COMMERCIAL

## 2025-06-03 ENCOUNTER — TELEPHONE (OUTPATIENT)
Dept: FAMILY MEDICINE | Facility: CLINIC | Age: 55
End: 2025-06-03
Payer: COMMERCIAL

## 2025-06-03 NOTE — TELEPHONE ENCOUNTER
First attempt. Left message for patient to return call to clinic.     Patient was scheduled for a pre-op visit with Acacia Petersen MD for 6/4/25 at 0900. Per [facility] payor information, patient [employer] insurance no longer accepted with the clinic.     After RN left message for patient to return call, noted that insurance in patient chart is no longer listed as [employer] it is currently listed as Work Comp- [facility].     RN huddled with management. Due to [employer] insurance, patient needs to be evaluated at a clinic that accepts this insurance. Will advise on [facility] or other [facility] clinics.     Humble Patiño, RN, BSN, PHN

## 2025-06-03 NOTE — TELEPHONE ENCOUNTER
Patient is returning call, he states that this appointment is to be billed under worker's comp. States that his last appointment was for workers comp as well.     Work Comp- [facility] is the insurance he is using. Per [employer], this insurance is Accepted        RN attempted to reach out to management but due to time of day, unable to reach. Patient has appointment tomorrow morning at 0900. RN will reach out again in the morning to management. Humble Patiño, RN, BSN, PHN     career/technical training

## 2025-06-04 ENCOUNTER — OFFICE VISIT (OUTPATIENT)
Dept: FAMILY MEDICINE | Facility: CLINIC | Age: 55
End: 2025-06-04
Payer: OTHER MISCELLANEOUS

## 2025-06-04 VITALS
OXYGEN SATURATION: 100 % | SYSTOLIC BLOOD PRESSURE: 120 MMHG | TEMPERATURE: 98.6 F | DIASTOLIC BLOOD PRESSURE: 84 MMHG | HEIGHT: 76 IN | HEART RATE: 64 BPM | BODY MASS INDEX: 27.25 KG/M2 | RESPIRATION RATE: 14 BRPM | WEIGHT: 223.8 LBS

## 2025-06-04 DIAGNOSIS — Z01.818 PREOP GENERAL PHYSICAL EXAM: Primary | ICD-10-CM

## 2025-06-04 DIAGNOSIS — M19.012 OSTEOARTHRITIS OF LEFT SHOULDER, UNSPECIFIED OSTEOARTHRITIS TYPE: ICD-10-CM

## 2025-06-04 LAB
ALBUMIN UR-MCNC: NEGATIVE MG/DL
APPEARANCE UR: CLEAR
BACTERIA #/AREA URNS HPF: ABNORMAL /HPF
BILIRUB UR QL STRIP: NEGATIVE
COLOR UR AUTO: YELLOW
ERYTHROCYTE [DISTWIDTH] IN BLOOD BY AUTOMATED COUNT: 13.2 % (ref 10–15)
GLUCOSE UR STRIP-MCNC: NEGATIVE MG/DL
HCT VFR BLD AUTO: 47.5 % (ref 40–53)
HGB BLD-MCNC: 15.9 G/DL (ref 13.3–17.7)
HGB UR QL STRIP: ABNORMAL
KETONES UR STRIP-MCNC: NEGATIVE MG/DL
LEUKOCYTE ESTERASE UR QL STRIP: NEGATIVE
MCH RBC QN AUTO: 31 PG (ref 26.5–33)
MCHC RBC AUTO-ENTMCNC: 33.5 G/DL (ref 31.5–36.5)
MCV RBC AUTO: 93 FL (ref 78–100)
MUCOUS THREADS #/AREA URNS LPF: PRESENT /LPF
NITRATE UR QL: NEGATIVE
PH UR STRIP: 5.5 [PH] (ref 5–7)
PLATELET # BLD AUTO: 212 10E3/UL (ref 150–450)
RBC # BLD AUTO: 5.13 10E6/UL (ref 4.4–5.9)
RBC #/AREA URNS AUTO: ABNORMAL /HPF
SP GR UR STRIP: 1.02 (ref 1–1.03)
SQUAMOUS #/AREA URNS AUTO: ABNORMAL /LPF
UROBILINOGEN UR STRIP-ACNC: 0.2 E.U./DL
WBC # BLD AUTO: 4.5 10E3/UL (ref 4–11)
WBC #/AREA URNS AUTO: ABNORMAL /HPF

## 2025-06-04 PROCEDURE — 99213 OFFICE O/P EST LOW 20 MIN: CPT | Performed by: FAMILY MEDICINE

## 2025-06-04 PROCEDURE — 85027 COMPLETE CBC AUTOMATED: CPT | Performed by: FAMILY MEDICINE

## 2025-06-04 PROCEDURE — 81001 URINALYSIS AUTO W/SCOPE: CPT | Performed by: FAMILY MEDICINE

## 2025-06-04 PROCEDURE — 36415 COLL VENOUS BLD VENIPUNCTURE: CPT | Performed by: FAMILY MEDICINE

## 2025-06-04 NOTE — PATIENT INSTRUCTIONS
How to Take Your Medication Before Surgery  Preoperative Medication Instructions   Antiplatelet or Anticoagulation Medication Instructions   - We reviewed the medication list and the patient is not on an antiplatelet or anticoagulation medications.    Additional Medication Instructions  Take all scheduled medications on the day of surgery       Patient Education   Preparing for Your Surgery  For Adults  Getting started  In most cases, a nurse will call to review your health history and instructions. They will give you an arrival time based on your scheduled surgery time. Please be ready to share:  Your doctor's clinic name and phone number  Your medical, surgical, and anesthesia history  A list of allergies and sensitivities  A list of medicines, including herbal treatments and over-the-counter drugs  Whether the patient has a legal guardian (ask how to send us the papers in advance)  Note: You may not receive a call if you were seen at our PAC (Preoperative Assessment Center).  Please tell us if you're pregnant--or if there's any chance you might be pregnant. Some surgeries may injure a fetus (unborn baby), so they require a pregnancy test. Surgeries that are safe for a fetus don't always need a test, and you can choose whether to have one.   Preparing for surgery  Within 10 to 30 days of surgery: Have a pre-op exam (sometimes called an H&P, or History and Physical). This can be done at a clinic or pre-operative center.  If you're having a , you may not need this exam. Talk to your care team.  At your pre-op exam, talk to your care team about all medicines you take. (This includes CBD oil and any drugs, such as THC, marijuana, and other forms of cannabis.) If you need to stop any medicine before surgery, ask when to start taking it again.  This is for your safety. Many medicines and drugs can make you bleed too much during surgery. Some change how well surgery (anesthesia) drugs work.  Call your insurance  company to let them know you're having surgery. (If you don't have insurance, call [phone].)  Call your clinic if there's any change in your health. This includes a scrape or scratch near the surgery site, or any signs of a cold (sore throat, runny nose, cough, rash, fever).  Eating and drinking guidelines  For your safety: Unless your surgeon tells you otherwise, follow the guidelines below.  Eat and drink as normal until 8 hours before you arrive for surgery. After that, no food or milk. You can spit out gum when you arrive.  Drink clear liquids until 2 hours before you arrive. These are liquids you can see through, like water, Gatorade, and Propel Water. They also include plain black coffee and tea (no cream or milk).  No alcohol for 24 hours before you arrive. The night before surgery, stop any drinks that contain THC.  If your care team tells you to take medicine on the morning of surgery, it's okay to take it with a sip of water. No other medicines or drugs are allowed (including CBD oil)--follow your care team's instructions.  If you have questions the day of surgery, call your hospital or surgery center.   Preventing infection  Shower or bathe the night before and the morning of surgery. Follow the instructions your clinic gave you. (If no instructions, use regular soap.)  Don't shave or clip hair near your surgery site. We'll remove the hair if needed.  Don't smoke or vape the morning of surgery. No chewing tobacco for 6 hours before you arrive. A nicotine patch is okay. You may spit out nicotine gum when you arrive.  For some surgeries, the surgeon will tell you to fully quit smoking and nicotine.  We will make every effort to keep you safe from infection. We will:  Clean our hands often with soap and water (or an alcohol-based hand rub).  Clean the skin at your surgery site with a special soap that kills germs.  Give you a special gown to keep you warm. (Cold raises the risk of infection.)  Wear hair  covers, masks, gowns, and gloves during surgery.  Give antibiotic medicine, if prescribed. Not all surgeries need this medicine.  What to bring on the day of surgery  Photo ID and insurance card  Copy of your health care directive, if you have one  Glasses and hearing aids (bring cases)  You can't wear contacts during surgery  Inhaler and eye drops, if you use them (tell us about these when you arrive)  CPAP machine or breathing device, if you use them  A few personal items, if spending the night  If you have . . .  A pacemaker, ICD (cardiac defibrillator), or other implant: Bring the ID card.  An implanted stimulator: Bring the remote control.  A legal guardian: Bring a copy of the certified (court-stamped) guardianship papers.  Please remove any jewelry, including body piercings. Leave jewelry and other valuables at home.  If you're going home the day of surgery  You must have a support person drive you home. They should stay with you overnight, and they may need to help with your self-care.  If you don't have a support person, please tells us as soon as possible. We can help.  After surgery  If it's hard to control your pain or you need more pain medicine, please call your surgeon's office.  Questions?   If you have any questions for your care team, list them here:   ____________________________________________________________________________________________________________________________________________________________________________________________________________________________________________________________  For informational purposes only. Not to replace the advice of your health care provider. Copyright   2003, 2019 [facility]. All rights reserved. Clinically reviewed by Chau Urena MD. [employer] [id number] - REV 02/25.

## 2025-06-04 NOTE — PROGRESS NOTES
Preoperative Evaluation  [address] [address]  Phone: [phone]  Primary Provider: Acacia Petersen MD  Pre-op Performing Provider: Acacia Petersen MD  Jun 4, 2025 6/4/2025   Surgical Information   What procedure is being done? left shoulder replacement   Facility or Hospital where procedure/surgery will be performed: [facility]   Who is doing the procedure / surgery? Dr. Branham   Date of surgery / procedure: 06/19/2025   Time of surgery / procedure: 8am   Where do you plan to recover after surgery? at home with family     Fax number for surgical facility: [phone]    Assessment & Plan     The proposed surgical procedure is considered INTERMEDIATE risk.    Preop general physical exam  - UA Macroscopic with reflex to Microscopic and Culture - Lab Collect; Future  - CBC with platelets; Future    Osteoarthritis of left shoulder, unspecified osteoarthritis type        - No identified additional risk factors other than previously addressed    Preoperative Medication Instructions  Antiplatelet or Anticoagulation Medication Instructions   - We reviewed the medication list and the patient is not on an antiplatelet or anticoagulation medications.    Additional Medication Instructions  Take all scheduled medications on the day of surgery    Recommendation  Approval given to proceed with proposed procedure, without further diagnostic evaluation.        Carloz Fischer is a 54 year old, presenting for the following:  Pre-Op Exam          6/4/2025     9:29 AM   Additional Questions   Roomed by Zeinab   Accompanied by self     HPI:   Longstanding progressive osteoarthritis of left shoulder.  Planned total joint replacement.          6/4/2025   Pre-Op Questionnaire   Have you ever had a heart attack or stroke? No   Have you ever had surgery on your heart or blood vessels, such as a stent placement, a coronary artery bypass, or  surgery on an artery in your head, neck, heart, or legs? No   Do you have chest pain with activity? No   Do you have a history of heart failure? No   Do you currently have a cold, bronchitis or symptoms of other infection? No   Do you have a cough, shortness of breath, or wheezing? No   Do you or anyone in your family have previous history of blood clots? No   Do you or does anyone in your family have a serious bleeding problem such as prolonged bleeding following surgeries or cuts? No   Have you ever had problems with anemia or been told to take iron pills? No   Have you had any abnormal blood loss such as black, tarry or bloody stools? No   Have you ever had a blood transfusion? No   Are you willing to have a blood transfusion if it is medically needed before, during, or after your surgery? Yes   Have you or any of your relatives ever had problems with anesthesia? No   Do you have sleep apnea, excessive snoring or daytime drowsiness? No   Do you have any artifical heart valves or other implanted medical devices like a pacemaker, defibrillator, or continuous glucose monitor? No   Do you have artificial joints? (!) YES   Are you allergic to latex? No     Advance Care Planning        Preoperative Review of    reviewed - no record of controlled substances prescribed.      Status of Chronic Conditions:  See problem list for active medical problems.  Problems all longstanding and stable, except as noted/documented.  See ROS for pertinent symptoms related to these conditions.    Patient Active Problem List    Diagnosis Date Noted    Osteoarthritis of right shoulder, unspecified osteoarthritis type 01/15/2024     Priority: Medium    Erectile dysfunction, unspecified erectile dysfunction type 10/18/2017     Priority: Medium    Labral tear of shoulder, left, initial encounter 05/24/2017     Priority: Medium    Osteoarthritis of left shoulder, unspecified osteoarthritis type 05/24/2017     Priority: Medium    S/P  arthroscopy of shoulder 05/09/2017     Priority: Medium    Vitamin D deficiency 02/19/2017     Priority: Medium    Scapular dyskinesis 06/05/2014     Priority: Medium    Seasonal allergies      Priority: Medium    CARDIOVASCULAR SCREENING; LDL GOAL LESS THAN 160 10/31/2010     Priority: Medium      Past Medical History:   Diagnosis Date    Pain     left thumb    Seasonal allergies      Past Surgical History:   Procedure Laterality Date    ARTHROPLASTY CARPOMETACARPAL (THUMB JOINT)      LT revision, Dr. Alok Rivera    ARTHROPLASTY CARPOMETACARPAL (THUMB JOINT), ARTHRODESIS, COMBINED  8/4/2011    Procedure:COMBINED ARTHROPLASTY CARPOMETACARPAL (THUMB JOINT), ARTHRODESIS; Metacarpalphalangeal Joint Fusion; Surgeon:ABNER GONZALEZ; Location:[facility]    ARTHROSCOPY SHOULDER RT/LT  8/10    LT decompression of impingement    COLONOSCOPY  01/25/2021    COLONOSCOPY N/A 1/25/2021    Procedure: Colonoscopy, With Polypectomy And Biopsy;  Surgeon: Abiola Amin DO;  Location: [facility] OR    COLONOSCOPY WITH CO2 INSUFFLATION N/A 1/25/2021    Procedure: COLONOSCOPY, WITH CO2 INSUFFLATION;  Surgeon: Abiola Amin DO;  Location: [facility]    COMBINED ESOPHAGOSCOPY, GASTROSCOPY, DUODENOSCOPY (EGD) WITH CO2 INSUFFLATION N/A 1/25/2021    Procedure: ESOPHAGOGASTRODUODENOSCOPY, WITH CO2 INSUFFLATION;  Surgeon: Abiola Amin DO;  Location: [facility]    ESOPHAGOSCOPY, GASTROSCOPY, DUODENOSCOPY (EGD), COMBINED N/A 1/25/2021    Procedure: Esophagogastroduodenoscopy, With Biopsy;  Surgeon: Abiola Amin DO;  Location: [facility]    REPAIR GAMEKEEPER'S THUMB  8/4/2011    Procedure:REPAIR LIGAMENT ULNAR COLLATERAL THUMB (GAMEKEEPER'S); Thumb Radial Reconstruction    ; Surgeon:ABNER GONZALEZ; Location:[facility]     Current Outpatient Medications   Medication Sig Dispense Refill    cetirizine (ZYRTEC) 10 MG tablet Take 1 tablet (10 mg) by mouth daily 30 tablet 1    triamcinolone (KENALOG) 0.1 % external cream Apply topically 2 times daily 45 g 0  "      Allergies   Allergen Reactions    Seasonal Allergies         Social History     Tobacco Use    Smoking status: Some Days     Types: Cigarettes, Hookah    Smokeless tobacco: Never   Substance Use Topics    Alcohol use: No     Family History   Problem Relation Age of Onset    Anesthesia Reaction No family hx of     Blood Disease No family hx of     Hypertension No family hx of     Cerebrovascular Disease No family hx of     C.A.D. No family hx of     Diabetes No family hx of     Cancer No family hx of      History   Drug Use No             Review of Systems  CONSTITUTIONAL: NEGATIVE for fever, chills, change in weight  INTEGUMENTARY/SKIN: NEGATIVE for worrisome rashes, moles or lesions  EYES: NEGATIVE for vision changes or irritation  ENT/MOUTH: NEGATIVE for ear, mouth and throat problems  RESP: NEGATIVE for significant cough or SOB  BREAST: NEGATIVE for masses, tenderness or discharge  CV: NEGATIVE for chest pain, palpitations or peripheral edema  GI: NEGATIVE for nausea, abdominal pain, heartburn, or change in bowel habits  : NEGATIVE for frequency, dysuria, or hematuria  MUSCULOSKELETAL: NEGATIVE for significant arthralgias or myalgia  NEURO: NEGATIVE for weakness, dizziness or paresthesias  ENDOCRINE: NEGATIVE for temperature intolerance, skin/hair changes  HEME: NEGATIVE for bleeding problems  PSYCHIATRIC: NEGATIVE for changes in mood or affect    Objective    /84 (BP Location: Right arm, Patient Position: Sitting, Cuff Size: Adult Large)   Pulse 64   Temp 98.6  F (37  C) (Oral)   Resp 14   Ht 1.93 m (6' 4\")   Wt 101.5 kg (223 lb 12.8 oz)   SpO2 100%   BMI 27.24 kg/m     Estimated body mass index is 27.24 kg/m  as calculated from the following:    Height as of this encounter: 1.93 m (6' 4\").    Weight as of this encounter: 101.5 kg (223 lb 12.8 oz).  Physical Exam  GENERAL: alert and no distress  EYES: Eyes grossly normal to inspection, PERRL and conjunctivae and sclerae normal  HENT: ear " "canals and TM's normal, nose and mouth without ulcers or lesions  NECK: no adenopathy, no asymmetry, masses, or scars  RESP: lungs clear to auscultation - no rales, rhonchi or wheezes  CV: regular rate and rhythm, normal S1 S2, no S3 or S4, no murmur, click or rub, no peripheral edema  ABDOMEN: soft, nontender, no hepatosplenomegaly, no masses and bowel sounds normal  MS: no gross musculoskeletal defects noted, no edema  SKIN: no suspicious lesions or rashes  NEURO: Normal strength and tone, mentation intact and speech normal  PSYCH: mentation appears normal, affect normal/bright    No results for input(s): \"HGB\", \"PLT\", \"INR\", \"NA\", \"POTASSIUM\", \"CR\", \"A1C\" in the last 8760 hours.     Diagnostics  CBC, urinalysis pending  No EKG indicated    Revised Cardiac Risk Index (RCRI)  The patient has the following serious cardiovascular risks for perioperative complications:   - No serious cardiac risks = 0 points     RCRI Interpretation: 0 points: Class I (very low risk - 0.4% complication rate)         Signed Electronically by: Acacia Petersen MD  A copy of this evaluation report is provided to the requesting physician.         "

## 2025-06-07 ENCOUNTER — RESULTS FOLLOW-UP (OUTPATIENT)
Dept: FAMILY MEDICINE | Facility: CLINIC | Age: 55
End: 2025-06-07

## 2025-06-11 ENCOUNTER — LAB (OUTPATIENT)
Dept: LAB | Facility: CLINIC | Age: 55
End: 2025-06-11
Payer: OTHER MISCELLANEOUS

## 2025-06-11 DIAGNOSIS — R03.0 ELEVATED BP WITHOUT DIAGNOSIS OF HYPERTENSION: ICD-10-CM

## 2025-06-11 PROCEDURE — 36415 COLL VENOUS BLD VENIPUNCTURE: CPT

## 2025-06-11 PROCEDURE — 80048 BASIC METABOLIC PNL TOTAL CA: CPT

## 2025-06-12 LAB
ANION GAP SERPL CALCULATED.3IONS-SCNC: 9 MMOL/L (ref 7–15)
BUN SERPL-MCNC: 15.5 MG/DL (ref 6–20)
CALCIUM SERPL-MCNC: 9.6 MG/DL (ref 8.8–10.4)
CHLORIDE SERPL-SCNC: 105 MMOL/L (ref 98–107)
CREAT SERPL-MCNC: 1.33 MG/DL (ref 0.67–1.17)
EGFRCR SERPLBLD CKD-EPI 2021: 64 ML/MIN/1.73M2
GLUCOSE SERPL-MCNC: 99 MG/DL (ref 70–99)
HCO3 SERPL-SCNC: 27 MMOL/L (ref 22–29)
POTASSIUM SERPL-SCNC: 4 MMOL/L (ref 3.4–5.3)
SODIUM SERPL-SCNC: 141 MMOL/L (ref 135–145)

## 2025-06-13 ENCOUNTER — RESULTS FOLLOW-UP (OUTPATIENT)
Dept: FAMILY MEDICINE | Facility: CLINIC | Age: 55
End: 2025-06-13

## 2025-07-01 ENCOUNTER — TRANSFERRED RECORDS (OUTPATIENT)
Dept: HEALTH INFORMATION MANAGEMENT | Facility: CLINIC | Age: 55
End: 2025-07-01
Payer: COMMERCIAL

## 2025-07-24 ENCOUNTER — THERAPY VISIT (OUTPATIENT)
Dept: PHYSICAL THERAPY | Facility: CLINIC | Age: 55
End: 2025-07-24
Payer: OTHER MISCELLANEOUS

## 2025-07-24 DIAGNOSIS — Z47.1 AFTERCARE FOLLOWING LEFT SHOULDER JOINT REPLACEMENT SURGERY: ICD-10-CM

## 2025-07-24 DIAGNOSIS — Z96.612 AFTERCARE FOLLOWING LEFT SHOULDER JOINT REPLACEMENT SURGERY: ICD-10-CM

## 2025-07-24 DIAGNOSIS — M25.512 ACUTE PAIN OF LEFT SHOULDER: Primary | ICD-10-CM

## 2025-07-24 ASSESSMENT — ACTIVITIES OF DAILY LIVING (ADL)
PUTTING_ON_A_SHIRT_THAT_BUTTONS_DOWN_THE_FRONT: 5
REMOVING_SOMETHING_FROM_YOUR_BACK_POCKET: 7
PUTTING_ON_AN_UNDERSHIRT_OR_A_PULLOVER_SWEATER: 8
PLEASE_INDICATE_YOR_PRIMARY_REASON_FOR_REFERRAL_TO_THERAPY:: SHOULDER
WASHING_YOUR_BACK: 10
WASHING_YOUR_HAIR?: 9
WHEN_LYING_ON_THE_INVOLVED_SIDE: 9
PUSHING_WITH_THE_INVOLVED_ARM: 5
TOUCHING_THE_BACK_OF_YOUR_NECK: 5
REACHING_FOR_SOMETHING_ON_A_HIGH_SHELF: 10
PUTTING_ON_YOUR_PANTS: 2
AT_ITS_WORST?: 6
PLACING_AN_OBJECT_ON_A_HIGH_SHELF: 10
CARRYING_A_HEAVY_OBJECT_OF_10_POUNDS: 5

## 2025-07-24 NOTE — PROGRESS NOTES
PHYSICAL THERAPY EVALUATION  Type of Visit: Evaluation       Fall Risk Screen:  Have you fallen 2 or more times in the past year?: No  Have you fallen and had an injury in the past year?: No    Subjective         Presenting condition or subjective complaint: pt reports that his L shoulder was originally injured at work in 2016 and then was in and out of PT and trialed injections without resounding success. He had a HH pyrocarbon replacement on his L shoulder with Dr. Branham including a biceps tendon transplantation on 6-19-25. PMH: had HH pyrocarbon on R shoulder in Jan of 2024  Date of onset: 06/19/25    Relevant medical history:     Dates & types of surgery: 07/19/2025 left shoulder replacement    Prior diagnostic imaging/testing results: MRI; X-ray     Prior therapy history for the same diagnosis, illness or injury: Yes        Living Environment  Social support: With a significant other or spouse   Type of home: [facility]   Stairs to enter the home: No       Ramp: No   Stairs inside the home: Yes   Is there a railing: Yes     Help at home: None  Equipment owned:       Employment: No    Hobbies/Interests: Working out    Patient goals for therapy: Use my left arm as before    Pain assessment: See objective evaluation for additional pain details     Objective   Pain:   Location: superior, lateral, UT, and CT junction pain  At rest: 1-2/10  With activity: 10/10  Quality: dull ache, sharp with quick/unexpected movement  Frequency: Intermittent  Time of Day: worse in am  Pain is exacerbated by: reaching overhead, behind back, lying on shoulder and lifting  Pain is relieved by: rest, ice, pain meds    Range of Motion:  AAROM L shoulder: wand flexion standing=89, ABD=71      AROM R shoulder: 158/151/T8/70    Strength:  L shoulder: NT    R shoulder: WNL  Notable mechanics: L scapular minimal medial border prominence, winging and upward translation with flexion.   Special Tests: NT  Palpation: Tension and hypertonicity  noted at L UT, LS, rhomboids  Posture: Minimally noted fwd head, rounded shoulders       Assessment & Plan   CLINICAL IMPRESSIONS  Medical Diagnosis: s/p L humeral head hemiarthroplasty with pyrocarbon and biceps transplantation    Treatment Diagnosis: L shoulder pain   Impression/Assessment: Patient is a 54 year old male with L shoulder complaints.  The following significant findings have been identified: Pain, Decreased ROM/flexibility, Decreased joint mobility, and Decreased strength. These impairments interfere with their ability to perform self care tasks, work tasks, recreational activities, household chores, and driving  as compared to previous level of function.     Clinical Decision Making (Complexity):  Clinical Presentation: Stable/Uncomplicated  Clinical Presentation Rationale: based on medical and personal factors listed in PT evaluation  Clinical Decision Making (Complexity): Low complexity    PLAN OF CARE  Treatment Interventions:  Interventions: Manual Therapy, Neuromuscular Re-education, Therapeutic Activity, Therapeutic Exercise    Long Term Goals     PT Goal 1  Goal Identifier: reaching  Goal Description: improve AROM and strength to allow pt to reach to top shelf of [place] cabinet with 1-2 # pain free and no hiking  Rationale: to maximize safety and independence with performance of ADLs and functional tasks;to maximize safety and independence within the community;to maximize safety and independence within the home;to maximize safety and independence with self cares  Target Date: 10/16/25      Frequency of Treatment: 1x/wk for 6 weeks then every other week for 6 weeks  Duration of Treatment: 12 weeks    Recommended Referrals to Other Professionals: Physical Therapy  Education Assessment:        Risks and benefits of evaluation/treatment have been explained.   Patient/Family/caregiver agrees with Plan of Care.     Evaluation Time:     PT Eval, Low Complexity Minutes (10688): 14       Signing Clinician:  Oneida Lopez, PT

## 2025-08-04 ENCOUNTER — THERAPY VISIT (OUTPATIENT)
Dept: PHYSICAL THERAPY | Facility: CLINIC | Age: 55
End: 2025-08-04
Payer: OTHER MISCELLANEOUS

## 2025-08-04 DIAGNOSIS — M25.512 ACUTE PAIN OF LEFT SHOULDER: Primary | ICD-10-CM

## 2025-08-04 DIAGNOSIS — Z96.612 AFTERCARE FOLLOWING LEFT SHOULDER JOINT REPLACEMENT SURGERY: ICD-10-CM

## 2025-08-04 DIAGNOSIS — Z47.1 AFTERCARE FOLLOWING LEFT SHOULDER JOINT REPLACEMENT SURGERY: ICD-10-CM

## 2025-08-04 PROCEDURE — 97110 THERAPEUTIC EXERCISES: CPT | Mod: GP | Performed by: PHYSICAL THERAPIST

## 2025-08-12 ENCOUNTER — THERAPY VISIT (OUTPATIENT)
Dept: PHYSICAL THERAPY | Facility: CLINIC | Age: 55
End: 2025-08-12
Payer: OTHER MISCELLANEOUS

## 2025-08-12 DIAGNOSIS — Z47.1 AFTERCARE FOLLOWING LEFT SHOULDER JOINT REPLACEMENT SURGERY: ICD-10-CM

## 2025-08-12 DIAGNOSIS — M25.512 ACUTE PAIN OF LEFT SHOULDER: Primary | ICD-10-CM

## 2025-08-12 DIAGNOSIS — Z96.612 AFTERCARE FOLLOWING LEFT SHOULDER JOINT REPLACEMENT SURGERY: ICD-10-CM

## 2025-08-12 PROCEDURE — 97112 NEUROMUSCULAR REEDUCATION: CPT | Mod: GP | Performed by: PHYSICAL THERAPIST

## 2025-08-12 PROCEDURE — 97110 THERAPEUTIC EXERCISES: CPT | Mod: GP | Performed by: PHYSICAL THERAPIST

## 2025-08-19 ENCOUNTER — THERAPY VISIT (OUTPATIENT)
Dept: PHYSICAL THERAPY | Facility: CLINIC | Age: 55
End: 2025-08-19
Payer: OTHER MISCELLANEOUS

## 2025-08-19 DIAGNOSIS — M25.512 ACUTE PAIN OF LEFT SHOULDER: Primary | ICD-10-CM

## 2025-08-19 DIAGNOSIS — Z47.1 AFTERCARE FOLLOWING LEFT SHOULDER JOINT REPLACEMENT SURGERY: ICD-10-CM

## 2025-08-19 DIAGNOSIS — Z96.612 AFTERCARE FOLLOWING LEFT SHOULDER JOINT REPLACEMENT SURGERY: ICD-10-CM

## 2025-08-19 PROCEDURE — 97110 THERAPEUTIC EXERCISES: CPT | Mod: GP | Performed by: PHYSICAL THERAPIST

## 2025-08-19 PROCEDURE — 97140 MANUAL THERAPY 1/> REGIONS: CPT | Mod: GP | Performed by: PHYSICAL THERAPIST

## 2025-08-26 ENCOUNTER — THERAPY VISIT (OUTPATIENT)
Dept: PHYSICAL THERAPY | Facility: CLINIC | Age: 55
End: 2025-08-26
Payer: OTHER MISCELLANEOUS

## 2025-08-26 DIAGNOSIS — Z47.1 AFTERCARE FOLLOWING LEFT SHOULDER JOINT REPLACEMENT SURGERY: ICD-10-CM

## 2025-08-26 DIAGNOSIS — M25.512 ACUTE PAIN OF LEFT SHOULDER: Primary | ICD-10-CM

## 2025-08-26 DIAGNOSIS — Z96.612 AFTERCARE FOLLOWING LEFT SHOULDER JOINT REPLACEMENT SURGERY: ICD-10-CM

## 2025-08-26 PROCEDURE — 97110 THERAPEUTIC EXERCISES: CPT | Mod: GP | Performed by: PHYSICAL THERAPIST

## 2025-08-26 PROCEDURE — 97140 MANUAL THERAPY 1/> REGIONS: CPT | Mod: GP | Performed by: PHYSICAL THERAPIST

## 2025-09-02 ENCOUNTER — THERAPY VISIT (OUTPATIENT)
Dept: PHYSICAL THERAPY | Facility: CLINIC | Age: 55
End: 2025-09-02
Payer: OTHER MISCELLANEOUS

## 2025-09-02 DIAGNOSIS — M25.512 ACUTE PAIN OF LEFT SHOULDER: Primary | ICD-10-CM

## 2025-09-02 DIAGNOSIS — Z96.612 AFTERCARE FOLLOWING LEFT SHOULDER JOINT REPLACEMENT SURGERY: ICD-10-CM

## 2025-09-02 DIAGNOSIS — Z47.1 AFTERCARE FOLLOWING LEFT SHOULDER JOINT REPLACEMENT SURGERY: ICD-10-CM

## 2025-09-02 PROCEDURE — 97140 MANUAL THERAPY 1/> REGIONS: CPT | Mod: GP | Performed by: PHYSICAL THERAPIST

## 2025-09-02 PROCEDURE — 97110 THERAPEUTIC EXERCISES: CPT | Mod: GP | Performed by: PHYSICAL THERAPIST

## (undated) DEVICE — PREP CHLORAPREP 26ML TINTED ORANGE  260815

## (undated) DEVICE — KIT ENDO FIRST STEP DISINFECTANT 200ML W/POUCH EP-4

## (undated) DEVICE — PAD CHUX UNDERPAD 23X24" 7136

## (undated) RX ORDER — FENTANYL CITRATE 50 UG/ML
INJECTION, SOLUTION INTRAMUSCULAR; INTRAVENOUS
Status: DISPENSED
Start: 2021-01-25